# Patient Record
Sex: MALE | Race: OTHER | HISPANIC OR LATINO | URBAN - METROPOLITAN AREA
[De-identification: names, ages, dates, MRNs, and addresses within clinical notes are randomized per-mention and may not be internally consistent; named-entity substitution may affect disease eponyms.]

---

## 2017-11-16 ENCOUNTER — EMERGENCY (EMERGENCY)
Facility: HOSPITAL | Age: 62
LOS: 1 days | Discharge: HOME | End: 2017-11-16
Attending: EMERGENCY MEDICINE | Admitting: EMERGENCY MEDICINE
Payer: MEDICARE

## 2017-11-16 VITALS
OXYGEN SATURATION: 98 % | RESPIRATION RATE: 14 BRPM | HEART RATE: 60 BPM | SYSTOLIC BLOOD PRESSURE: 121 MMHG | TEMPERATURE: 98 F | DIASTOLIC BLOOD PRESSURE: 65 MMHG

## 2017-11-16 VITALS
HEART RATE: 78 BPM | DIASTOLIC BLOOD PRESSURE: 43 MMHG | SYSTOLIC BLOOD PRESSURE: 93 MMHG | OXYGEN SATURATION: 97 % | RESPIRATION RATE: 15 BRPM | TEMPERATURE: 98 F

## 2017-11-16 LAB
ALBUMIN SERPL ELPH-MCNC: 3.8 G/DL — SIGNIFICANT CHANGE UP (ref 3.3–5)
ALP SERPL-CCNC: 87 U/L — SIGNIFICANT CHANGE UP (ref 40–120)
ALT FLD-CCNC: 17 U/L — SIGNIFICANT CHANGE UP (ref 4–41)
AST SERPL-CCNC: 20 U/L — SIGNIFICANT CHANGE UP (ref 4–40)
BASE EXCESS BLDV CALC-SCNC: 5.7 MMOL/L — SIGNIFICANT CHANGE UP
BASOPHILS # BLD AUTO: 0.06 K/UL — SIGNIFICANT CHANGE UP (ref 0–0.2)
BASOPHILS NFR BLD AUTO: 1.4 % — SIGNIFICANT CHANGE UP (ref 0–2)
BILIRUB SERPL-MCNC: 0.2 MG/DL — SIGNIFICANT CHANGE UP (ref 0.2–1.2)
BLOOD GAS VENOUS - CREATININE: 0.45 MG/DL — LOW (ref 0.5–1.3)
BUN SERPL-MCNC: 10 MG/DL — SIGNIFICANT CHANGE UP (ref 7–23)
CALCIUM SERPL-MCNC: 8.6 MG/DL — SIGNIFICANT CHANGE UP (ref 8.4–10.5)
CHLORIDE BLDV-SCNC: 102 MMOL/L — SIGNIFICANT CHANGE UP (ref 96–108)
CHLORIDE SERPL-SCNC: 99 MMOL/L — SIGNIFICANT CHANGE UP (ref 98–107)
CO2 SERPL-SCNC: 28 MMOL/L — SIGNIFICANT CHANGE UP (ref 22–31)
CREAT SERPL-MCNC: 0.56 MG/DL — SIGNIFICANT CHANGE UP (ref 0.5–1.3)
EOSINOPHIL # BLD AUTO: 0.12 K/UL — SIGNIFICANT CHANGE UP (ref 0–0.5)
EOSINOPHIL NFR BLD AUTO: 2.8 % — SIGNIFICANT CHANGE UP (ref 0–6)
GAS PNL BLDV: 132 MMOL/L — LOW (ref 136–146)
GLUCOSE BLDV-MCNC: 181 — HIGH (ref 70–99)
GLUCOSE SERPL-MCNC: 183 MG/DL — HIGH (ref 70–99)
HCO3 BLDV-SCNC: 28 MMOL/L — HIGH (ref 20–27)
HCT VFR BLD CALC: 35.9 % — LOW (ref 39–50)
HCT VFR BLDV CALC: 38.1 % — LOW (ref 39–51)
HGB BLD-MCNC: 11.7 G/DL — LOW (ref 13–17)
HGB BLDV-MCNC: 12.4 G/DL — LOW (ref 13–17)
IMM GRANULOCYTES # BLD AUTO: 0.01 # — SIGNIFICANT CHANGE UP
IMM GRANULOCYTES NFR BLD AUTO: 0.2 % — SIGNIFICANT CHANGE UP (ref 0–1.5)
LACTATE BLDV-MCNC: 1.1 MMOL/L — SIGNIFICANT CHANGE UP (ref 0.5–2)
LYMPHOCYTES # BLD AUTO: 1.58 K/UL — SIGNIFICANT CHANGE UP (ref 1–3.3)
LYMPHOCYTES # BLD AUTO: 36.7 % — SIGNIFICANT CHANGE UP (ref 13–44)
MAGNESIUM SERPL-MCNC: 1.8 MG/DL — SIGNIFICANT CHANGE UP (ref 1.6–2.6)
MCHC RBC-ENTMCNC: 30.2 PG — SIGNIFICANT CHANGE UP (ref 27–34)
MCHC RBC-ENTMCNC: 32.6 % — SIGNIFICANT CHANGE UP (ref 32–36)
MCV RBC AUTO: 92.8 FL — SIGNIFICANT CHANGE UP (ref 80–100)
MONOCYTES # BLD AUTO: 0.48 K/UL — SIGNIFICANT CHANGE UP (ref 0–0.9)
MONOCYTES NFR BLD AUTO: 11.1 % — SIGNIFICANT CHANGE UP (ref 2–14)
NEUTROPHILS # BLD AUTO: 2.06 K/UL — SIGNIFICANT CHANGE UP (ref 1.8–7.4)
NEUTROPHILS NFR BLD AUTO: 47.8 % — SIGNIFICANT CHANGE UP (ref 43–77)
NRBC # FLD: 0 — SIGNIFICANT CHANGE UP
PCO2 BLDV: 57 MMHG — HIGH (ref 41–51)
PH BLDV: 7.36 PH — SIGNIFICANT CHANGE UP (ref 7.32–7.43)
PHOSPHATE SERPL-MCNC: 3.9 MG/DL — SIGNIFICANT CHANGE UP (ref 2.5–4.5)
PLATELET # BLD AUTO: 302 K/UL — SIGNIFICANT CHANGE UP (ref 150–400)
PMV BLD: 10.1 FL — SIGNIFICANT CHANGE UP (ref 7–13)
PO2 BLDV: 34 MMHG — LOW (ref 35–40)
POTASSIUM BLDV-SCNC: 3.8 MMOL/L — SIGNIFICANT CHANGE UP (ref 3.4–4.5)
POTASSIUM SERPL-MCNC: 4.1 MMOL/L — SIGNIFICANT CHANGE UP (ref 3.5–5.3)
POTASSIUM SERPL-SCNC: 4.1 MMOL/L — SIGNIFICANT CHANGE UP (ref 3.5–5.3)
PROT SERPL-MCNC: 6.3 G/DL — SIGNIFICANT CHANGE UP (ref 6–8.3)
RBC # BLD: 3.87 M/UL — LOW (ref 4.2–5.8)
RBC # FLD: 12.7 % — SIGNIFICANT CHANGE UP (ref 10.3–14.5)
SAO2 % BLDV: 61 % — SIGNIFICANT CHANGE UP (ref 60–85)
SODIUM SERPL-SCNC: 138 MMOL/L — SIGNIFICANT CHANGE UP (ref 135–145)
WBC # BLD: 4.31 K/UL — SIGNIFICANT CHANGE UP (ref 3.8–10.5)
WBC # FLD AUTO: 4.31 K/UL — SIGNIFICANT CHANGE UP (ref 3.8–10.5)

## 2017-11-16 PROCEDURE — 99283 EMERGENCY DEPT VISIT LOW MDM: CPT | Mod: GC

## 2017-11-16 RX ORDER — POLYETHYLENE GLYCOL 3350 17 G/17G
17 POWDER, FOR SOLUTION ORAL ONCE
Qty: 0 | Refills: 0 | Status: COMPLETED | OUTPATIENT
Start: 2017-11-16 | End: 2017-11-16

## 2017-11-16 RX ADMIN — POLYETHYLENE GLYCOL 3350 17 GRAM(S): 17 POWDER, FOR SOLUTION ORAL at 23:15

## 2017-11-16 NOTE — ED ADULT TRIAGE NOTE - CHIEF COMPLAINT QUOTE
Pt brought in by EMS for constipation since 5PM.  Pt states this happens intermittently.  Denies any abdominal pain/N/V/urinary symptoms.  Pt appears unkempt.  Pt states has no place to go if discharged.  Denies any PMHx.

## 2017-11-16 NOTE — ED ADULT NURSE NOTE - OBJECTIVE STATEMENT
Received on stretcher in 4, awake, alert, NAD observed, respirations even and unlabored on room air. 61 YO male no significant PMH c/o constipation. Endorses constipation 4-5 months, last BM earlier this evening, states it was normal. Denies other complaints. Comfort and safety measures provided. Call bell within reach.

## 2017-11-16 NOTE — ED PROVIDER NOTE - ATTENDING CONTRIBUTION TO CARE
eric: limited hx from pt. No records in EMR.  Homeless and appears with 2 suitcases. States he is constipated but had his lat BM today. Knows season,. year and president. Appears tired but offers no complaints.  has several hospital bands. One has date 11/11/17. pt states he stayed overnight at [facility]; reason??  Pt is hungary and asking for food.   exam: unkempt. otherwise unremarkable.  Will obtain labs and contact

## 2017-12-03 ENCOUNTER — EMERGENCY (EMERGENCY)
Facility: HOSPITAL | Age: 62
LOS: 0 days | Discharge: HOME | End: 2017-12-03

## 2017-12-03 DIAGNOSIS — L03.119 CELLULITIS OF UNSPECIFIED PART OF LIMB: ICD-10-CM

## 2017-12-03 DIAGNOSIS — Z02.9 ENCOUNTER FOR ADMINISTRATIVE EXAMINATIONS, UNSPECIFIED: ICD-10-CM

## 2017-12-05 ENCOUNTER — INPATIENT (INPATIENT)
Facility: HOSPITAL | Age: 62
LOS: 8 days | Discharge: NURSING, REHAB OR LONG-TERM CARE | End: 2017-12-14
Attending: INTERNAL MEDICINE

## 2017-12-05 DIAGNOSIS — L03.119 CELLULITIS OF UNSPECIFIED PART OF LIMB: ICD-10-CM

## 2017-12-18 DIAGNOSIS — Z91.14 PATIENT'S OTHER NONCOMPLIANCE WITH MEDICATION REGIMEN: ICD-10-CM

## 2017-12-18 DIAGNOSIS — L03.116 CELLULITIS OF LEFT LOWER LIMB: ICD-10-CM

## 2017-12-18 DIAGNOSIS — R05 COUGH: ICD-10-CM

## 2017-12-18 DIAGNOSIS — E87.6 HYPOKALEMIA: ICD-10-CM

## 2017-12-18 DIAGNOSIS — F17.210 NICOTINE DEPENDENCE, CIGARETTES, UNCOMPLICATED: ICD-10-CM

## 2017-12-18 DIAGNOSIS — E83.42 HYPOMAGNESEMIA: ICD-10-CM

## 2017-12-18 DIAGNOSIS — I10 ESSENTIAL (PRIMARY) HYPERTENSION: ICD-10-CM

## 2017-12-18 DIAGNOSIS — E87.1 HYPO-OSMOLALITY AND HYPONATREMIA: ICD-10-CM

## 2017-12-18 DIAGNOSIS — J45.909 UNSPECIFIED ASTHMA, UNCOMPLICATED: ICD-10-CM

## 2017-12-18 DIAGNOSIS — Z59.0 HOMELESSNESS: ICD-10-CM

## 2017-12-18 DIAGNOSIS — E11.9 TYPE 2 DIABETES MELLITUS WITHOUT COMPLICATIONS: ICD-10-CM

## 2017-12-18 SDOH — ECONOMIC STABILITY - HOUSING INSECURITY: HOMELESSNESS: Z59.0

## 2018-04-04 ENCOUNTER — EMERGENCY (EMERGENCY)
Facility: HOSPITAL | Age: 63
LOS: 0 days | Discharge: HOME | End: 2018-04-04
Attending: EMERGENCY MEDICINE

## 2018-04-04 VITALS
OXYGEN SATURATION: 99 % | RESPIRATION RATE: 18 BRPM | DIASTOLIC BLOOD PRESSURE: 93 MMHG | TEMPERATURE: 98 F | HEART RATE: 72 BPM | HEIGHT: 71 IN | SYSTOLIC BLOOD PRESSURE: 154 MMHG | WEIGHT: 173.06 LBS
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
## 2018-04-04 DIAGNOSIS — M79.672 PAIN IN LEFT FOOT: ICD-10-CM
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
## 2018-04-04 DIAGNOSIS — S93.602A UNSPECIFIED SPRAIN OF LEFT FOOT, INITIAL ENCOUNTER: ICD-10-CM
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
## 2018-04-04 RX ORDER — ACETAMINOPHEN 500 MG
975 TABLET ORAL ONCE
Qty: 0 | Refills: 0 | Status: COMPLETED | OUTPATIENT
Start: 2018-04-04 | End: 2018-04-04

## 2018-04-04 RX ADMIN — Medication 975 MILLIGRAM(S): at 04:47

## 2018-04-04 NOTE — ED PROVIDER NOTE - ATTENDING CONTRIBUTION TO CARE
I personally evaluated the patient. I reviewed the Resident’s or Physician Assistant’s note (as assigned above), and agree with the findings and plan

## 2018-04-04 NOTE — ED PROVIDER NOTE - OBJECTIVE STATEMENT
62 year old male here for left foot pain x 1 year. patient states that he walks a lot and when he does it hurts. patient states better with rest. denies injury, redness, fever, chills.

## 2018-04-06 ENCOUNTER — EMERGENCY (EMERGENCY)
Facility: HOSPITAL | Age: 63
LOS: 0 days | Discharge: HOME | End: 2018-04-06
Attending: EMERGENCY MEDICINE

## 2018-04-06 VITALS
SYSTOLIC BLOOD PRESSURE: 130 MMHG | OXYGEN SATURATION: 99 % | RESPIRATION RATE: 18 BRPM | HEART RATE: 90 BPM | DIASTOLIC BLOOD PRESSURE: 81 MMHG

## 2018-04-06 VITALS
HEIGHT: 71 IN | OXYGEN SATURATION: 99 % | DIASTOLIC BLOOD PRESSURE: 89 MMHG | WEIGHT: 173.06 LBS | TEMPERATURE: 98 F | SYSTOLIC BLOOD PRESSURE: 132 MMHG | RESPIRATION RATE: 18 BRPM | HEART RATE: 98 BPM

## 2018-04-06 DIAGNOSIS — K59.00 CONSTIPATION, UNSPECIFIED: ICD-10-CM

## 2018-04-06 DIAGNOSIS — E11.9 TYPE 2 DIABETES MELLITUS WITHOUT COMPLICATIONS: ICD-10-CM

## 2018-04-06 RX ORDER — MULTIVIT WITH MIN/MFOLATE/K2 340-15/3 G
300 POWDER (GRAM) ORAL ONCE
Qty: 0 | Refills: 0 | Status: COMPLETED | OUTPATIENT
Start: 2018-04-06 | End: 2018-04-06

## 2018-04-06 RX ADMIN — Medication 300 MILLILITER(S): at 13:01

## 2018-04-06 NOTE — ED PROVIDER NOTE - MEDICAL DECISION MAKING DETAILS
I have personally performed a history and physical exam on this patient and personally directed the management of the patient. Patient presents for evaluation of constipation, he has not had a bowel movement 2-3 days he denies any fevers or chills. He denies any vomiting.  He denies any fevers or chills  On physical exam the patient is nc/at perrla eomi oropharynx clear cta b/l, rrr s1s2 noted abd-soft nt nd bs +e xt from with no focal deficits.  He was given a magnesium citrate and has improved I will discharge at this time

## 2018-04-06 NOTE — ED PROVIDER NOTE - OBJECTIVE STATEMENT
62y M with constipation.  Hx paranoid schitzophrenia, DM, with constipation for 2 days.  No nausea or vomiting, no fevers, no diarrhea, no abd pain, no chest pain, no SOB, no cough.  Not taking any of his meds for a long time.  No allergies.

## 2018-04-06 NOTE — ED PROVIDER NOTE - PHYSICAL EXAMINATION
CONSTITUTIONAL: Well-developed; well-nourished; in no acute distress.   SKIN: warm, dry  HEAD: Normocephalic; atraumatic.  EYES: PERRL, EOM, no conj injection, sclera clear  ENT: No nasal discharge; airway clear.  NECK: Supple; non tender.  No midline ttp ctls  CARD: S1, S2 normal; no murmurs, no gallops, no rubs. Regular rate and rhythm. 2+ RPs and DPs bilat, no carotid bruits, no pedal edema, no calf pain b/l  RESP: CTAB good air movement No wheezes, no rales, no rhonchi.  ABD: soft, nd, no rebound no guarding, bowel sounds x 4 ext, no CVA ttp, nt  EXT: Normal ROM.  No clubbing, no cyanosis   LYMPH: No acute cervical adenopathy.  NEURO: Alert, oriented, motor 5/5 bilat, sensation intact bilat, CN 2-12 intact, no nystagmus, no dysmetria on finger to nose, neg pronator, neg romberg, no quadrantanopsia, nml gait.   PSYCH: Cooperative, appropriate. No SI, no HI, no VH, no AH.

## 2018-04-06 NOTE — ED PROVIDER NOTE - NS ED ROS FT
Eyes:  No eye pain No visual changes, or discharge.  ENMT:  No ear pain No hearing changes, discharge or infections. No neck pain or stiffness. No throat pain  Cardiac:  No chest pain, SOB or edema.   Respiratory:  No cough or respiratory distress. No hemoptysis.   GI:  No nausea, vomiting, diarrhea, +constipation no abdominal pain.  :  No dysuria, frequency or burning.  MS:  No myalgia, joint pain or back pain.  Neuro:  No headache, paresthesias or weakness.  No LOC.  Skin:  No skin rash.

## 2018-04-07 ENCOUNTER — EMERGENCY (EMERGENCY)
Facility: HOSPITAL | Age: 63
LOS: 0 days | Discharge: HOME | End: 2018-04-07
Attending: EMERGENCY MEDICINE | Admitting: EMERGENCY MEDICINE

## 2018-04-07 VITALS
DIASTOLIC BLOOD PRESSURE: 74 MMHG | RESPIRATION RATE: 18 BRPM | TEMPERATURE: 97 F | SYSTOLIC BLOOD PRESSURE: 132 MMHG | OXYGEN SATURATION: 97 % | HEART RATE: 90 BPM

## 2018-04-07 VITALS
DIASTOLIC BLOOD PRESSURE: 67 MMHG | SYSTOLIC BLOOD PRESSURE: 152 MMHG | TEMPERATURE: 97 F | HEART RATE: 97 BPM | HEIGHT: 71 IN | OXYGEN SATURATION: 98 % | RESPIRATION RATE: 18 BRPM | WEIGHT: 173.06 LBS

## 2018-04-07 DIAGNOSIS — R26.2 DIFFICULTY IN WALKING, NOT ELSEWHERE CLASSIFIED: ICD-10-CM

## 2018-04-07 DIAGNOSIS — E11.9 TYPE 2 DIABETES MELLITUS WITHOUT COMPLICATIONS: ICD-10-CM

## 2018-04-07 NOTE — ED PROVIDER NOTE - OBJECTIVE STATEMENT
62 M pmh paranoid schizophrenia bibems for inability to ambulate per patient.  patient reports he is walking with shorter steps, concerned he may get a cramp. no pain. no midline back pain. no loss of sensation.

## 2018-04-07 NOTE — ED PROVIDER NOTE - MEDICAL DECISION MAKING DETAILS
62 male here for limited ambulation. Has known psych history and is undomiciled. Medical screening exam completed will continue as outpatient.

## 2018-04-07 NOTE — ED PROVIDER NOTE - PHYSICAL EXAMINATION
CONSTITUTIONAL: Well-developed; well-nourished; in no acute distress.   SKIN: warm, dry  ENT: no nasal discharge; airway clear.  CARD: S1, S2 normal  RESP: No wheezes, rales or rhonchi.  EXT: Normal ROM.  5/5 strength bilaterally.  normal gait.   NEURO: Alert, oriented, grossly unremarkable

## 2018-04-07 NOTE — ED PROVIDER NOTE - NS ED ROS FT
MS:  inability to ambulate per patient.  walking slower.   Neuro:  No headache or weakness.   Skin:  No skin rash.

## 2018-04-13 ENCOUNTER — EMERGENCY (EMERGENCY)
Facility: HOSPITAL | Age: 63
LOS: 0 days | Discharge: HOME | End: 2018-04-13
Attending: EMERGENCY MEDICINE

## 2018-04-13 VITALS
WEIGHT: 149.91 LBS | TEMPERATURE: 98 F | SYSTOLIC BLOOD PRESSURE: 98 MMHG | DIASTOLIC BLOOD PRESSURE: 61 MMHG | HEIGHT: 70 IN | HEART RATE: 98 BPM | OXYGEN SATURATION: 98 % | RESPIRATION RATE: 20 BRPM

## 2018-04-13 DIAGNOSIS — Z59.0 HOMELESSNESS: ICD-10-CM

## 2018-04-13 DIAGNOSIS — F99 MENTAL DISORDER, NOT OTHERWISE SPECIFIED: ICD-10-CM

## 2018-04-13 DIAGNOSIS — E11.9 TYPE 2 DIABETES MELLITUS WITHOUT COMPLICATIONS: ICD-10-CM

## 2018-04-13 DIAGNOSIS — F17.210 NICOTINE DEPENDENCE, CIGARETTES, UNCOMPLICATED: ICD-10-CM

## 2018-04-13 SDOH — ECONOMIC STABILITY - HOUSING INSECURITY: HOMELESSNESS: Z59.0

## 2018-04-13 NOTE — ED PROVIDER NOTE - PHYSICAL EXAMINATION
GENERAL: NAD, well-developed  CHEST/LUNG: Clear to auscultation bilaterally; No wheeze, rhonchi, or rales  HEART: Regular rate and rhythm; No murmurs, rubs, or gallops  PSYCH: AAOx3, cooperative, appropriate GENERAL: NAD, well-developed  CHEST/LUNG: Clear to auscultation bilaterally; No wheeze, rhonchi, or rales  HEART: Regular rate and rhythm; No murmurs, rubs, or gallops  PSYCH: AAOx3, cooperative, appropriate  HEAD/Neck, NC/AT  Ambulating well in ED

## 2018-04-13 NOTE — ED PROVIDER NOTE - PROGRESS NOTE DETAILS
Patient states he is feeling fine and has no complaints at this time. Patient is requesting food and to be sent home with metro card. PA Fellow note reviewed, Patient alert, Sober, ambulating well in ED. Stable for discharge

## 2018-04-13 NOTE — ED PROVIDER NOTE - ATTENDING CONTRIBUTION TO CARE
Pt is a 63yo male with schizoaffective disorder and homelessness who was brought in by EMS after he was found sleeping in front of someone's house.  Denies any acute medical complaints.    Exam: unkempt, eating a meal, clear speech, normal gait  Imp: psych disorder  Plan: dc

## 2018-04-13 NOTE — ED PROVIDER NOTE - OBJECTIVE STATEMENT
[age] yo male BIB [facility]  after being found sleeping in front of someone's house.  Patient has no complaints. Patient denies fainting, dizziness, recent fall, SOB, chest pain, foot pain, or N/V.

## 2018-04-13 NOTE — ED PROVIDER NOTE - NS ED ROS FT
CONSTITUTIONAL: No weakness, fevers or chills  RESPIRATORY:  No shortness of breath  CARDIOVASCULAR: No chest pain or palpitations  GASTROINTESTINAL: No abdominal pain. No nausea or vomiting  NEUROLOGICAL: No dizziness. No headache. No fall. No fainting or blackouts.  SKIN: No itching, rashes

## 2018-04-15 ENCOUNTER — EMERGENCY (EMERGENCY)
Facility: HOSPITAL | Age: 63
LOS: 0 days | Discharge: HOME | End: 2018-04-15
Attending: EMERGENCY MEDICINE

## 2018-04-15 VITALS
RESPIRATION RATE: 20 BRPM | DIASTOLIC BLOOD PRESSURE: 84 MMHG | OXYGEN SATURATION: 99 % | HEART RATE: 84 BPM | TEMPERATURE: 97 F | SYSTOLIC BLOOD PRESSURE: 146 MMHG | WEIGHT: 173.06 LBS | HEIGHT: 71 IN

## 2018-04-15 DIAGNOSIS — R53.83 OTHER FATIGUE: ICD-10-CM

## 2018-04-15 DIAGNOSIS — E11.9 TYPE 2 DIABETES MELLITUS WITHOUT COMPLICATIONS: ICD-10-CM

## 2018-04-15 NOTE — ED PROVIDER NOTE - NS ED ROS FT
Review of Systems    Constitutional: (-) fever  Cardiovascular: (-) chest pain, (-) syncope  Respiratory: (-) cough, (-) shortness of breath  Gastrointestinal: (-) vomiting, (-) diarrhea  Musculoskeletal: (-) neck pain, (-) back pain, (-) joint pain  Integumentary: (-) rash, (-) edema  Neurological: (-) headache, (-) altered mental status  Psychiatric: (-) hallucinations

## 2018-04-15 NOTE — ED PROVIDER NOTE - PHYSICAL EXAMINATION
Gen: Alert, NAD, well appearing  Head: NC, AT, PERRL, EOMI, normal lids/conjunctiva  Neck: +supple, no tenderness/meningismus,  Pulm: Bilateral BS, normal resp effort, no wheeze/stridor/retractions  CV: RRR, no murmer  Abd: soft, NT/ND, +BS, no organomegaly  Mskel: no edema/erythema/cyanosis  Skin: no rash, warm/dry  Neuro: AAOx3, no sensory/motor deficits

## 2018-04-15 NOTE — ED PROVIDER NOTE - OBJECTIVE STATEMENT
61 yo male here stating that he is tired of walking. No CP, SOB, abdominal pains. No f/c/n/v. No homicidal or suicidal ideations.

## 2018-04-15 NOTE — ED PROVIDER NOTE - ATTENDING CONTRIBUTION TO CARE
I personally evaluated the patient. I reviewed the Resident’s or Physician Assistant’s note (as assigned above), and agree with the findings and plan except as documented in my note.  Pt undomiciled, no acute medical issue, given breakfast

## 2018-04-17 ENCOUNTER — EMERGENCY (EMERGENCY)
Facility: HOSPITAL | Age: 63
LOS: 0 days | Discharge: NURSING, REHAB OR LONG-TERM CARE | End: 2018-04-17
Attending: EMERGENCY MEDICINE | Admitting: EMERGENCY MEDICINE

## 2018-04-17 VITALS
SYSTOLIC BLOOD PRESSURE: 104 MMHG | TEMPERATURE: 97 F | RESPIRATION RATE: 18 BRPM | OXYGEN SATURATION: 98 % | DIASTOLIC BLOOD PRESSURE: 55 MMHG | HEART RATE: 74 BPM

## 2018-04-17 VITALS
SYSTOLIC BLOOD PRESSURE: 123 MMHG | RESPIRATION RATE: 19 BRPM | HEART RATE: 88 BPM | OXYGEN SATURATION: 98 % | TEMPERATURE: 97 F | HEIGHT: 69 IN | DIASTOLIC BLOOD PRESSURE: 59 MMHG | WEIGHT: 160.06 LBS

## 2018-04-17 DIAGNOSIS — I83.92 ASYMPTOMATIC VARICOSE VEINS OF LEFT LOWER EXTREMITY: ICD-10-CM

## 2018-04-17 DIAGNOSIS — R53.83 OTHER FATIGUE: ICD-10-CM

## 2018-04-17 DIAGNOSIS — E11.9 TYPE 2 DIABETES MELLITUS WITHOUT COMPLICATIONS: ICD-10-CM

## 2018-04-17 DIAGNOSIS — K59.09 OTHER CONSTIPATION: ICD-10-CM

## 2018-04-17 DIAGNOSIS — F17.200 NICOTINE DEPENDENCE, UNSPECIFIED, UNCOMPLICATED: ICD-10-CM

## 2018-04-17 DIAGNOSIS — K40.90 UNILATERAL INGUINAL HERNIA, WITHOUT OBSTRUCTION OR GANGRENE, NOT SPECIFIED AS RECURRENT: ICD-10-CM

## 2018-04-17 DIAGNOSIS — L29.9 PRURITUS, UNSPECIFIED: ICD-10-CM

## 2018-04-17 LAB
ALBUMIN SERPL ELPH-MCNC: 4 G/DL — SIGNIFICANT CHANGE UP (ref 3.5–5.2)
ALP SERPL-CCNC: 93 U/L — SIGNIFICANT CHANGE UP (ref 30–115)
ALT FLD-CCNC: 28 U/L — SIGNIFICANT CHANGE UP (ref 0–41)
ANION GAP SERPL CALC-SCNC: 15 MMOL/L — HIGH (ref 7–14)
AST SERPL-CCNC: 34 U/L — SIGNIFICANT CHANGE UP (ref 0–41)
BILIRUB SERPL-MCNC: 0.4 MG/DL — SIGNIFICANT CHANGE UP (ref 0.2–1.2)
BUN SERPL-MCNC: 11 MG/DL — SIGNIFICANT CHANGE UP (ref 10–20)
CALCIUM SERPL-MCNC: 8.7 MG/DL — SIGNIFICANT CHANGE UP (ref 8.5–10.1)
CHLORIDE SERPL-SCNC: 95 MMOL/L — LOW (ref 98–110)
CO2 SERPL-SCNC: 26 MMOL/L — SIGNIFICANT CHANGE UP (ref 17–32)
CREAT SERPL-MCNC: 0.5 MG/DL — LOW (ref 0.7–1.5)
GLUCOSE SERPL-MCNC: 230 MG/DL — HIGH (ref 70–99)
HCT VFR BLD CALC: 36.8 % — LOW (ref 42–52)
HGB BLD-MCNC: 12.5 G/DL — LOW (ref 14–18)
MCHC RBC-ENTMCNC: 31.3 PG — HIGH (ref 27–31)
MCHC RBC-ENTMCNC: 34 G/DL — SIGNIFICANT CHANGE UP (ref 32–37)
MCV RBC AUTO: 92.2 FL — SIGNIFICANT CHANGE UP (ref 80–94)
NRBC # BLD: 0 /100 WBCS — SIGNIFICANT CHANGE UP (ref 0–0)
PLATELET # BLD AUTO: 308 K/UL — SIGNIFICANT CHANGE UP (ref 130–400)
POTASSIUM SERPL-MCNC: 3.7 MMOL/L — SIGNIFICANT CHANGE UP (ref 3.5–5)
POTASSIUM SERPL-SCNC: 3.7 MMOL/L — SIGNIFICANT CHANGE UP (ref 3.5–5)
PROT SERPL-MCNC: 6.1 G/DL — SIGNIFICANT CHANGE UP (ref 6–8)
RBC # BLD: 3.99 M/UL — LOW (ref 4.7–6.1)
RBC # FLD: 12.6 % — SIGNIFICANT CHANGE UP (ref 11.5–14.5)
SODIUM SERPL-SCNC: 136 MMOL/L — SIGNIFICANT CHANGE UP (ref 135–146)
WBC # BLD: 5 K/UL — SIGNIFICANT CHANGE UP (ref 4.8–10.8)
WBC # FLD AUTO: 5 K/UL — SIGNIFICANT CHANGE UP (ref 4.8–10.8)

## 2018-04-17 NOTE — ED PROVIDER NOTE - OBJECTIVE STATEMENT
62 homeless male presenting via EMS for fatigue. That is his 3rd visit in 2 weeks to the ED. He reports chronic constipation with intermittent episode of loose stools. Denies melena, hematochezia, abdominal pain, N/V, fever or chills. No chest pain, dyspnea, palpitations, weight changes. He was in a group home for while and then left it for some reasons. 62 homeless male with DM II and schizophrenia presenting via EMS for fatigue. That is his 3rd visit in 2 weeks to the ED. He reports chronic constipation with intermittent episode of loose stools. Denies melena, hematochezia, abdominal pain, N/V, fever or chills. No chest pain, dyspnea, palpitations, weight changes. He was in a group home for while and then left it for some reasons. He is not taking any medications for his DM or schizophrenia. 62 homeless male with DM II and schizophrenia presenting via EMS for fatigue. That is his 3rd visit in 2 weeks to the ED. He reports chronic constipation with intermittent episode of loose stools. Denies melena, hematochezia, abdominal pain, N/V, fever or chills. No chest pain, dyspnea, palpitations, weight changes. He was in a group home for while and then left it for unknown reasons. He is not taking any medications for his DM or schizophrenia.

## 2018-04-17 NOTE — ED ADULT TRIAGE NOTE - CHIEF COMPLAINT QUOTE
brought by ems   awake alert steady gait  just hungry and looking for shelter /  24 hour drop in shelters given to pt

## 2018-04-17 NOTE — ED PROVIDER NOTE - ATTENDING CONTRIBUTION TO CARE
I personally evaluated the patient. I reviewed the Resident’s or Physician Assistant’s note (as assigned above), and agree with the findings and plan except as documented in my note.  Patient seen in ED and presumed homeless, PE as above, labs reviewed, after consultation with social work discovered eloped from [facility] 2 weeks ago, d/w Tang from the home in the ED, will d/c pt back

## 2018-04-17 NOTE — ED PROVIDER NOTE - NS ED ROS FT
REVIEW OF SYSTEMS:    CONSTITUTIONAL: no fever or chills.  HEENT: No visual changes, no hearing loss, no throat pain, no headache.  NECK: No pain or stiffness, no lymphadenopathy.  RESPIRATORY: No cough, wheezing, hemoptysis. No shortness of breath.  CARDIOVASCULAR: No chest pain, no palpitations, no orthopnea, no PND.  GASTROINTESTINAL: see HPI.  GENITOURINARY: No dysuria, frequency or hematuria.  NEUROLOGICAL: No paresthesia or weakness, no dizziness.  MUSCULOSKELETAL: No back pain. No joint pain or swelling, no morning stiffness. Normal ROM.  SKIN: + itching, no rashes.

## 2018-09-22 ENCOUNTER — EMERGENCY (EMERGENCY)
Facility: HOSPITAL | Age: 63
LOS: 0 days | Discharge: HOME | End: 2018-09-22
Attending: EMERGENCY MEDICINE | Admitting: EMERGENCY MEDICINE

## 2018-09-22 VITALS
OXYGEN SATURATION: 98 % | DIASTOLIC BLOOD PRESSURE: 79 MMHG | TEMPERATURE: 98 F | WEIGHT: 143.08 LBS | HEIGHT: 70 IN | RESPIRATION RATE: 20 BRPM | SYSTOLIC BLOOD PRESSURE: 136 MMHG | HEART RATE: 79 BPM

## 2018-09-22 DIAGNOSIS — M25.50 PAIN IN UNSPECIFIED JOINT: ICD-10-CM

## 2018-09-22 DIAGNOSIS — G89.29 OTHER CHRONIC PAIN: ICD-10-CM

## 2018-09-22 DIAGNOSIS — E11.9 TYPE 2 DIABETES MELLITUS WITHOUT COMPLICATIONS: ICD-10-CM

## 2018-09-22 DIAGNOSIS — I10 ESSENTIAL (PRIMARY) HYPERTENSION: ICD-10-CM

## 2018-09-22 DIAGNOSIS — F25.9 SCHIZOAFFECTIVE DISORDER, UNSPECIFIED: ICD-10-CM

## 2018-09-22 DIAGNOSIS — Z91.14 PATIENT'S OTHER NONCOMPLIANCE WITH MEDICATION REGIMEN: ICD-10-CM

## 2018-09-22 DIAGNOSIS — E78.5 HYPERLIPIDEMIA, UNSPECIFIED: ICD-10-CM

## 2018-09-22 PROBLEM — F25.0 SCHIZOAFFECTIVE DISORDER, BIPOLAR TYPE: Chronic | Status: ACTIVE | Noted: 2018-04-06

## 2018-09-22 RX ORDER — ACETAMINOPHEN 500 MG
650 TABLET ORAL ONCE
Qty: 0 | Refills: 0 | Status: COMPLETED | OUTPATIENT
Start: 2018-09-22 | End: 2018-09-22

## 2018-09-22 RX ADMIN — Medication 650 MILLIGRAM(S): at 05:58

## 2018-09-22 NOTE — ED PROVIDER NOTE - PHYSICAL EXAMINATION
Physical Exam  General: Awake, alert, NAD, WDWN, non-toxic appearing, NCAT  Eyes: PERRL, EOMI, no icterus, lids and conjunctivae are normal  ENT: External inspection normal, pink/moist membranes, no pharyngeal erythema/exudate  CV: S1S2, regular rate and rhythm, no murmur/gallops/rubs, no JVD, 2+ pulses b/l, no edema/cords/homans, warm/well-perfused  Respiratory: Normal respiratory rate/effort, no respiratory distress, normal voice, speaking full sentences, lungs clear to auscultation b/l, no wheezing/rales/rhonchi, no retractions, no stridor  Abdomen: Soft abdomen, no tender/distended/guarding/rebound, no CVA tender  Musculoskeletal: FROM all 4 extremities, N/V intact, pelvis stable, no TLS spinal tender/deform/step-offs, no johnathon tender/deform, stable gait  Neck: FROM neck, supple, no meningismus, trachea midline, no JVD, no cspine tender/step-offs  Integumentary: Color normal for race, warm and dry, no rash  Neuro: Oriented x3, CN 2-12 grossly intact, normal motor, normal sensory, normal gait

## 2018-09-22 NOTE — ED PROVIDER NOTE - MEDICAL DECISION MAKING DETAILS
63m w months-years of multiple joint pain. no trauma. analgesia given. Pt advised regarding symptomatic/supportive care, importance of PMD f/u, and symptoms to prompt ED return.

## 2018-09-22 NOTE — ED PROVIDER NOTE - PLAN OF CARE
63m w months-years of multiple joint pain. nontoxic appearing, n/v intact, no trauma, nonfocal neuro. --Analgesia as needed, symptomatic and supportive care, f/u PMD. 63m w months-years of multiple joint pain. nontoxic appearing, n/v intact, no trauma, nonfocal neuro. --Analgesia as needed, symptomatic and supportive care, f/u PMD/clinic.

## 2018-09-22 NOTE — ED PROVIDER NOTE - OBJECTIVE STATEMENT
63m w DM, HTN, HLD but not compliant w medications, presents for eval. Pt reports that he has pain all over his body for months-years. Symptoms are constant, moderate, no exacerbating/alleviating. No fall, no trauma. Pt denies SI/HI, denies self-harm attempt or OD. Pt reports that he came from the [facility].

## 2018-09-22 NOTE — ED PROVIDER NOTE - NS ED ROS FT
Review of Systems  Constitutional:  No fever or chills.   Eyes:  Negative.   ENMT:  No nasal congestion, discharge, or throat pain.   Cardiac:  No chest pain, syncope, or edema.  Respiratory:  No dyspnea, wheezing, or cough. No hemoptysis.  GI:  No vomiting, diarrhea, or abdominal pain. No melena or hematochezia.  :  No dysuria or hematuria.   Musculoskeletal:  No joint swelling or back pain. Chronic joint pain  Skin:  No skin rash, jaundice, or lesions.  Neuro:  No headache, loss of sensation, or focal weakness.  No change in mental status.

## 2018-09-22 NOTE — ED ADULT NURSE NOTE - NSIMPLEMENTINTERV_GEN_ALL_ED
Implemented All Universal Safety Interventions:  [place] to call system. Call bell, personal items and telephone within reach. Instruct patient to call for assistance. Room bathroom lighting operational. Non-slip footwear when patient is off stretcher. Physically safe environment: no spills, clutter or unnecessary equipment. Stretcher in lowest position, wheels locked, appropriate side rails in place.

## 2018-09-22 NOTE — ED PROVIDER NOTE - CARE PLAN
Assessment and plan of treatment:	63m w months-years of multiple joint pain. nontoxic appearing, n/v intact, no trauma, nonfocal neuro. --Analgesia as needed, symptomatic and supportive care, f/u PMD. Principal Discharge DX:	Chronic joint pain  Assessment and plan of treatment:	63m w months-years of multiple joint pain. nontoxic appearing, n/v intact, no trauma, nonfocal neuro. --Analgesia as needed, symptomatic and supportive care, f/u PMD/clinic.

## 2018-11-16 NOTE — ED ADULT TRIAGE NOTE - WEIGHT IN LBS
CONST: Well appearing in NAD  EYES:  Sclera and conjunctiva clear.  ENT: No nasal discharge.  Oropharynx normal appearing, no erythema or exudates. Uvula midline.  NECK: Non-tender, no meningeal signs, supple  CARD: Normal S1 S2; Normal rate and rhythm  RESP: Equal BS B/L, No wheezes, rhonchi or rales. No distress  GI: Soft, non-tender, non-distended.  MS: Normal ROM in all extremities. no calf pain, radial pulses 2+ bilaterally  SKIN: Warm, dry, no acute rashes. Good turgor  NEURO: awake alert and oriented to person and place, answering questions inappropriately, agitated, No focal deficits. Strength 5/5 with no sensory deficits 173

## 2019-01-28 ENCOUNTER — EMERGENCY (EMERGENCY)
Facility: HOSPITAL | Age: 64
LOS: 0 days | Discharge: HOME | End: 2019-01-28
Attending: EMERGENCY MEDICINE | Admitting: EMERGENCY MEDICINE

## 2019-01-28 VITALS
OXYGEN SATURATION: 97 % | RESPIRATION RATE: 20 BRPM | DIASTOLIC BLOOD PRESSURE: 81 MMHG | SYSTOLIC BLOOD PRESSURE: 107 MMHG | HEART RATE: 99 BPM | TEMPERATURE: 98 F

## 2019-01-28 DIAGNOSIS — Z59.0 HOMELESSNESS: ICD-10-CM

## 2019-01-28 DIAGNOSIS — E11.9 TYPE 2 DIABETES MELLITUS WITHOUT COMPLICATIONS: ICD-10-CM

## 2019-01-28 SDOH — ECONOMIC STABILITY - HOUSING INSECURITY: HOMELESSNESS: Z59.0

## 2019-01-28 NOTE — ED PROVIDER NOTE - PHYSICAL EXAMINATION
AOx4, disheveled, Non toxic appearing, NAD, speaking in full sentences.   Skin - warm and dry, no acute rash.   Head - normocephalic, atraumatic.   Eyes - PERRLA/EOMI, conjunctiva and sclera clear.   ENT- MM moist, no nasal discharge.  Pharynx unremarkable.  No mastoid or temporal ttp.   Neck - supple nt, no meningeal signs.   Heart - RRR s1s2 nl, no rub/murmur.   Lungs- No retractions, BS equal, CTAB.   Abdomen - soft ntnd no r/g.   Extremities- No LE edema, calves nttp b/l.

## 2019-01-28 NOTE — ED ADULT NURSE NOTE - CHIEF COMPLAINT QUOTE
"I come from the [place]. I'm just stopping by for a bite to eat. Then I'll be on my way." Pt is homeless.

## 2019-01-28 NOTE — ED PROVIDER NOTE - OBJECTIVE STATEMENT
62 yo m with no reported PMHx, homeless, presents for a meal. patient is from the [place]. Doesn't know [place] well. Just wanted something to eat. Has no complaints. Denies CP, SOB, abd pain

## 2019-01-28 NOTE — ED ADULT NURSE NOTE - NSIMPLEMENTINTERV_GEN_ALL_ED
Implemented All Universal Safety Interventions:  [place] to call system. Call bell, personal items and telephone within reach. Instruct patient to call for assistance. Room bathroom lighting operational. Non-slip footwear when patient is off stretcher. Physically safe environment: no spills, clutter or unnecessary equipment. Stretcher in lowest position, wheels locked, appropriate side rails in place.

## 2019-01-28 NOTE — ED ADULT TRIAGE NOTE - CHIEF COMPLAINT QUOTE
"I come from the [place]. I'm just stopping by for a bite to eat. Then I'll be on my way." Pt is homeless.

## 2019-08-17 NOTE — ED PROVIDER NOTE - TOBACCO USE
· Patient reports was drinking again up until beginning of July when she sustained a GI bleed  · Continue Lasix 20mg daily which was previously 10mg however patient confirms taking 20mg daily  · No longer taking spironolactone  · Continue 2g sodium diet  · Continue multivitamin, thiamine, and folic acid Current every day smoker

## 2019-09-23 ENCOUNTER — EMERGENCY (EMERGENCY)
Facility: HOSPITAL | Age: 64
LOS: 1 days | Discharge: HOME | End: 2019-09-23
Admitting: EMERGENCY MEDICINE
Payer: COMMERCIAL

## 2019-09-23 VITALS
SYSTOLIC BLOOD PRESSURE: 135 MMHG | RESPIRATION RATE: 15 BRPM | TEMPERATURE: 99 F | HEART RATE: 81 BPM | OXYGEN SATURATION: 99 % | DIASTOLIC BLOOD PRESSURE: 60 MMHG

## 2019-09-23 PROCEDURE — 99283 EMERGENCY DEPT VISIT LOW MDM: CPT

## 2019-09-23 NOTE — ED ADULT TRIAGE NOTE - CHIEF COMPLAINT QUOTE
Pt brought in by EMS from the street.  Pt homeless and called EMS because he was walking around and his feet hurt.  Presently denies any physical complaints, states pain has since resolved.  Denies any SI/HI/AH/VH.  Denies any drugs/ETOH.  Calm and cooperative in triage.  PMHx:  schizoaffective, DM

## 2019-09-23 NOTE — ED PROVIDER NOTE - PATIENT PORTAL LINK FT
You can access the FollowMyHealth Patient Portal offered by [facility] by registering at the following website: [email or web address]. By joining [employer]’s FollowMyHealth portal, you will also be able to view your health information using other applications (apps) compatible with our system.

## 2019-09-23 NOTE — ED PROVIDER NOTE - OBJECTIVE STATEMENT
63 y/o male pmh dm, schizoaffectiove, homeless, with no active complaints. Pt states that he was "hanging out around the business center and decided he wanted to get checked out." Pt has no active complaints. Pt is asking for milk and a sandwich. Denies chest pain, sob, abd pain, n/v/d, fever or chills.

## 2019-11-13 ENCOUNTER — EMERGENCY (EMERGENCY)
Facility: HOSPITAL | Age: 64
LOS: 0 days | Discharge: HOME | End: 2019-11-13
Attending: EMERGENCY MEDICINE
Payer: COMMERCIAL

## 2019-11-13 VITALS
TEMPERATURE: 98 F | WEIGHT: 162.92 LBS | SYSTOLIC BLOOD PRESSURE: 163 MMHG | HEIGHT: 63 IN | OXYGEN SATURATION: 99 % | HEART RATE: 80 BPM | RESPIRATION RATE: 18 BRPM | DIASTOLIC BLOOD PRESSURE: 83 MMHG

## 2019-11-13 DIAGNOSIS — R68.89 OTHER GENERAL SYMPTOMS AND SIGNS: ICD-10-CM

## 2019-11-13 DIAGNOSIS — F20.9 SCHIZOPHRENIA, UNSPECIFIED: ICD-10-CM

## 2019-11-13 DIAGNOSIS — Z59.0 HOMELESSNESS: ICD-10-CM

## 2019-11-13 PROCEDURE — 99282 EMERGENCY DEPT VISIT SF MDM: CPT

## 2019-11-13 SDOH — ECONOMIC STABILITY - HOUSING INSECURITY: HOMELESSNESS: Z59.0

## 2019-11-13 NOTE — ED ADULT NURSE NOTE - NSIMPLEMENTINTERV_GEN_ALL_ED
Implemented All Universal Safety Interventions:  [place] to call system. Call bell, personal items and telephone within reach. Instruct patient to call for assistance. Room bathroom lighting operational. Non-slip footwear when patient is off stretcher. Physically safe environment: no spills, clutter or unnecessary equipment. Stretcher in lowest position, wheels locked, appropriate side rails in place.

## 2019-11-13 NOTE — ED PROVIDER NOTE - PATIENT PORTAL LINK FT
You can access the FollowMyHealth Patient Portal offered by [facility] by registering at the following website: [email or web address]. By joining [employer]’s FollowMyHealth portal, you will also be able to view your health information using other applications (apps) compatible with our system.

## 2019-11-13 NOTE — ED ADULT TRIAGE NOTE - CHIEF COMPLAINT QUOTE
pt states "I'm cold". pt walked up to EMS and asked for ride to the hospital because he was cold. denies medical history

## 2019-11-13 NOTE — ED PROVIDER NOTE - PHYSICAL EXAMINATION
Gen: Alert, unkempt, disheveled   Head: NC, AT   Eyes: eomi  ENT: moist membranes   Neck: supple  Pulm: ctab  CV: RRR, ext wwp  Abd: soft, NT  Mskel: extremities x4 with normal ROM and no joint effusions. no ctl spine ttp.   Skin: no wounds  Neuro: AAOx3

## 2019-11-14 ENCOUNTER — EMERGENCY (EMERGENCY)
Facility: HOSPITAL | Age: 64
LOS: 1 days | Discharge: HOME | End: 2019-11-14
Attending: EMERGENCY MEDICINE
Payer: COMMERCIAL

## 2019-11-14 VITALS
RESPIRATION RATE: 16 BRPM | HEART RATE: 82 BPM | SYSTOLIC BLOOD PRESSURE: 146 MMHG | TEMPERATURE: 97 F | DIASTOLIC BLOOD PRESSURE: 86 MMHG | OXYGEN SATURATION: 100 %

## 2019-11-14 VITALS
TEMPERATURE: 98 F | OXYGEN SATURATION: 98 % | DIASTOLIC BLOOD PRESSURE: 79 MMHG | HEIGHT: 65 IN | SYSTOLIC BLOOD PRESSURE: 161 MMHG | RESPIRATION RATE: 17 BRPM | WEIGHT: 149.91 LBS | HEART RATE: 87 BPM

## 2019-11-14 PROCEDURE — 99282 EMERGENCY DEPT VISIT SF MDM: CPT

## 2019-11-14 PROCEDURE — 82962 GLUCOSE BLOOD TEST: CPT

## 2019-11-14 PROCEDURE — 99283 EMERGENCY DEPT VISIT LOW MDM: CPT

## 2019-11-14 NOTE — ED ADULT NURSE NOTE - NSFALLRSKUNASSIST_ED_ALL_ED
What Type Of Note Output Would You Prefer (Optional)?: Bullet Format How Severe Is Your Skin Lesion?: mild Has Your Skin Lesion Been Treated?: not been treated Is This A New Presentation, Or A Follow-Up?: Skin Lesion no

## 2019-11-14 NOTE — ED PROVIDER NOTE - ATTENDING CONTRIBUTION TO CARE
64M, uncomiciled, with htn, DM, biba with complaint of weakness. Pt found sleeping on a bench and EMS called. Pt denies any complaints, but stated thought he could become weak so they brought him to hospital. Denies cp, sob, abd pain, f/c, n/v/d, or any other complaints. Denies any recent falls or trauma. Denies etoh or drug use.    PE: NAD, NCAT, MMM, Trachea midline, Normal conjunctiva, lungs CTAB, S1/S2 RRR, Normal perfusion, 2+ radial pulses bilat, Abdomen Soft, NTND, No rebound/guarding, No LE edema, No deformity of extremities, No rashes,  No focal motor or sensory deficits.     Pt appears well. Will check FS. Involve SW to help with placement in shelter. - Chaz Wills MD

## 2019-11-14 NOTE — ED PROVIDER NOTE - PATIENT PORTAL LINK FT
You can access the FollowMyHealth Patient Portal offered by [facility] by registering at the following website: [email or web address]. By joining [employer]’s FollowMyHealth portal, you will also be able to view your health information using other applications (apps) compatible with our system.

## 2019-11-14 NOTE — ED PROVIDER NOTE - NSFOLLOWUPINSTRUCTIONS_ED_ALL_ED_FT
Please follow up with your primary care physician in 24-48 hours  Please come back if any of the following: Fever, chest pain, abdominal pain, shortness of breath, weakness or any major concern

## 2019-11-14 NOTE — CHART NOTE - NSCHARTNOTEFT_GEN_A_CORE
EMERGENCY : SW consulted by medical team due to patient being undomiciled and medically cleared for discharge. LMSW reviewed patients chart. As per chart review patient is a 64 year old, , English speaking, homeless male with a pmh of HTN, and diabetes. Patient BIBEMS due to concern for weakness.   LMSW met with patient at bedside and introduced self and role to which patient verbalized understanding. Patient alert and oriented x4 at this time. Patient states that he was coming from [place] to [place] via bus when he fell asleep at the bus stop and was brought in by EMS. Patient states that sometimes his parents and sister allow him to reside at home with them at [address] [place] [address], [id number]. Patient unable to recall family member’s phone numbers at this time resulting in patient unable to provide emergency contact to LMSW. Patient states that when he cannot stay with them he either goes to a drop-in shelter in [place] or wherever he can find a place to stay. Patient states he is primarily in the [place]. LMSW provided education on drop-in shelter in [place]. LMSW provided patient with information regarding [facility] drop-in shelters. Patient receptive to resources. Patient states that he has Medicaid and Medicare insurance benefits but does not know his ID number. Patient provided LMSW with social security number to look up insurance benefits. LMSW with the assistance of the [employer] ED rep was able to locate and confirm that patient does have Medicaid Insurance benefits ID # [id number]. LMSW provided patient with ID number for future use. LMSW also provided patient with clinics to f/u with medical care. Patient states upon discharge he will take public transportation to the [place] drop-in shelter. Patient states he has no resources for transportation at this time. LMSW provided patient with metro card which is good for 2 trips, card # [id number]. Patient states he is familiar with public transit system. Patient thankful for resources provided and has no other needs or concerns at this time. LMSW provided patient with contact information and ensured ongoing SW availability. SW to remain available for any further needs.

## 2019-11-14 NOTE — ED PROVIDER NOTE - PHYSICAL EXAMINATION
Gen: AAOx3, malodorous   Head: NCAT  HEENT: EOMI, oral mucosa moist, normal conjunctiva  Lung: CTAB, no respiratory distress,  speaking in full sentences  CV: RRR, no murmurs, rubs or gallops  Abd: soft, NTND, no guarding  MSK: no visible deformities           Strength: 5/5 UE/LE B/L  Neuro: No focal sensory or motor deficits             Steady gait  Skin: Warm, well perfused, no rash  Psych: normal affect.   ~Josesito Mcdermott M.D. Resident

## 2019-11-14 NOTE — ED PROVIDER NOTE - NS ED ROS FT
GENERAL: No fever or chills, EYES: no change in vision, HEENT: no trouble swallowing or speaking, CARDIAC: no chest pain, PULMONARY: no cough or SOB, GI: no abdominal pain, no nausea, no vomiting, no diarrhea or constipation, : No changes in urination, SKIN: no rashes, NEURO: no headache,  MSK: No joint pain ~Josesito Mcdermott M.D. Resident

## 2019-11-14 NOTE — ED PROVIDER NOTE - OBJECTIVE STATEMENT
64yM homeless  man with h/o  HTN, and diabetes BIBEMS due to concern for weakness. Patient states that he was sleeping on a bench and was awaken by EMS and was brought here because he told them he might get weak later on. No fever, chest pain, abd pain, n/v, urinary or bowel irregularities.

## 2019-11-14 NOTE — ED ADULT NURSE NOTE - OBJECTIVE STATEMENT
63 yo male presents to the ED via EMS. Was sleeping outside on a bench when bystanders called EMS. Was complaining of weakness to EMS but upon assessment, patient denies any weakness or pain. Per patient, PMH of HTN and DM. States he is homeless, was seen at [facility] yesterday. Denies headache, dizziness, vision changes, chest pain, shortness of breath, abdominal pain, nausea, vomiting, diarrhea, fevers, chills, dysuria, hematuria, recent illness travel or fall.

## 2019-11-14 NOTE — ED PROVIDER NOTE - CLINICAL SUMMARY MEDICAL DECISION MAKING FREE TEXT BOX
64yM homeless  man with h/o  HTN, and diabetes BIBEMS due to concern for weakness without any significant physical findings or symptoms. No blood work needed. WIll check fingerstick and restart 64yM homeless  man with h/o  HTN, and diabetes BIBEMS due to concern for weakness without any significant physical findings or symptoms. No blood work needed. WIll check fingerstick and reassess

## 2019-11-14 NOTE — ED ADULT NURSE NOTE - NSIMPLEMENTINTERV_GEN_ALL_ED
Implemented All Fall Risk Interventions:  [place] to call system. Call bell, personal items and telephone within reach. Instruct patient to call for assistance. Room bathroom lighting operational. Non-slip footwear when patient is off stretcher. Physically safe environment: no spills, clutter or unnecessary equipment. Stretcher in lowest position, wheels locked, appropriate side rails in place. Provide visual cue, wrist band, yellow gown, etc. Monitor gait and stability. Monitor for mental status changes and reorient to person, place, and time. Review medications for side effects contributing to fall risk. Reinforce activity limits and safety measures with patient and family.

## 2019-11-15 ENCOUNTER — EMERGENCY (EMERGENCY)
Facility: HOSPITAL | Age: 64
LOS: 1 days | Discharge: HOME | End: 2019-11-15
Attending: EMERGENCY MEDICINE
Payer: COMMERCIAL

## 2019-11-15 VITALS
HEART RATE: 98 BPM | SYSTOLIC BLOOD PRESSURE: 135 MMHG | OXYGEN SATURATION: 99 % | RESPIRATION RATE: 16 BRPM | DIASTOLIC BLOOD PRESSURE: 66 MMHG | TEMPERATURE: 97 F

## 2019-11-15 VITALS
HEART RATE: 103 BPM | RESPIRATION RATE: 18 BRPM | TEMPERATURE: 98 F | WEIGHT: 153 LBS | SYSTOLIC BLOOD PRESSURE: 136 MMHG | HEIGHT: 71 IN | DIASTOLIC BLOOD PRESSURE: 68 MMHG | OXYGEN SATURATION: 98 %

## 2019-11-15 PROCEDURE — 99283 EMERGENCY DEPT VISIT LOW MDM: CPT

## 2019-11-15 PROCEDURE — 99284 EMERGENCY DEPT VISIT MOD MDM: CPT

## 2019-11-15 PROCEDURE — 82962 GLUCOSE BLOOD TEST: CPT

## 2019-11-15 RX ORDER — METFORMIN HYDROCHLORIDE 850 MG/1
1 TABLET ORAL
Qty: 14 | Refills: 0
Start: 2019-11-15 | End: 2019-11-28

## 2019-11-15 NOTE — ED PROVIDER NOTE - NSFOLLOWUPCLINICS_GEN_ALL_ED_FT
[facility] General Internal Medicine  General Internal Medicine  [address] [id number]  Phone: [phone]  Fax:   Follow Up Time:

## 2019-11-15 NOTE — ED PROVIDER NOTE - CLINICAL SUMMARY MEDICAL DECISION MAKING FREE TEXT BOX
63 yo m bibems due to feeling cold. pt no longer feels cold. pt denies all acute complaints. reports he is tired. physical exam benign. vss, no hypothermia. fs. dc 65 yo m bibems due to feeling cold. pt no longer feels cold. pt denies all acute complaints. reports he is tired. physical exam benign. vss, no hypothermia. fs. will send metformin to pharmacy as pt will be unable to store insulin. dc pending [facility] in am. 65 yo m bibems due to feeling cold. pt no longer feels cold. pt denies all acute complaints. reports he is tired. physical exam benign. vss, no hypothermia. fs. will send metformin to pharmacy as pt will be unable to store insulin. dc.

## 2019-11-15 NOTE — ED PROVIDER NOTE - PATIENT PORTAL LINK FT
You can access the FollowMyHealth Patient Portal offered by [facility] by registering at the following website: [email or web address]. By joining [employer]’s FollowMyHealth portal, you will also be able to view your health information using other applications (apps) compatible with our system.

## 2019-11-15 NOTE — ED PROVIDER NOTE - NSFOLLOWUPINSTRUCTIONS_ED_ALL_ED_FT
1) Please follow up with your Primary Care Provider in 24-48 hours  2) Seek immediate medical care for any new or returning symptoms including but not limited high fevers, difficulty breathing  3) Take Metformin 500 mg once a day

## 2019-11-15 NOTE — ED PROVIDER NOTE - ATTENDING CONTRIBUTION TO CARE
Afebrile. Awake and Alert. Unkempt. CN II-XII grossly intact. Moves all extremities without lateralization.    Pt seen by KE[facility] for shelter yesterday in ED. Returns today b/c it is cold outside. No complaints. Blood glucose elevated. Pt used to be on insulin. Given homeless situation unlikely to be able to continue on insulin. Offered to send Metformin to [facility]. Advised pt to f/u medical clinic. Pt agreeable. Will see  for transport.

## 2019-11-15 NOTE — ED PROVIDER NOTE - PHYSICAL EXAMINATION
General: nad  HEENT: EOMI, PERRLA, no lesions on the lips or on oral mucosa, normal external ear, poor dentition  Neck: supple, no lymphadenopathy, full range of motion, no nuchal rigidity  CV: RRR, normal S1 and S2 with no murmur, capillary refill less than two seconds  Resp: lungs CTA b/l, good aeration bilaterally, symmetric chest wall   Abd: non-distended, soft, non-tender, no guarding/rebound  : no CVA tenderness  MSK: full range of motion, no cyanosis, no edema, no clubbing, no immobility  Neuro: CN II-XII grossly intact, muscle strength 5/5 in all extremities, normal gait

## 2019-11-15 NOTE — ED PROVIDER NOTE - OBJECTIVE STATEMENT
65 yo m pmh htn, DM, BIBEMS due to complaint of feeling "cold". pt reports he is homeless, was cold since "sun went down". reports he no longer feels cold since being picked up from EMS. pt denies all acute complaints. denies taking all medications. pt was seen and eval at [facility] yesterday.

## 2019-11-15 NOTE — ED ADULT NURSE NOTE - OBJECTIVE STATEMENT
65 y/o male A&Ox3 presents to ED via EMS after being picked up at [facility] by EMS for being "cold". Pt was seen in ED yesterday and was sent home. Pt skin warm to touch, dressed in layers of clothing, no acute distress noted at this time. Non labored respirations, no use of accessory muscles, no cough, wheezing or edema noted. No fevers, chills, dysuria, n/v/d. Denies CP, SOB, weakness, numbness and tingling. Safety measures maintained with side rails and bed in low position.

## 2019-11-15 NOTE — ED PROVIDER NOTE - NS ED ROS FT
GENERAL: No fever or chills, //             EYES: no change in vision, //             HEENT: no trouble swallowing or speaking, //             CARDIAC: no chest pain, //              PULMONARY: no cough or SOB, //             GI: no abdominal pain, no nausea or no vomiting, no diarrhea or constipation, //             : No changes in urination,  //            SKIN: no rashes,  //            NEURO: no headache,  //             MSK: No joint pain otherwise as HPI or negative. ~Juan Mcfarlane DO PGY2

## 2019-11-17 ENCOUNTER — EMERGENCY (EMERGENCY)
Facility: HOSPITAL | Age: 64
LOS: 0 days | Discharge: HOME | End: 2019-11-17
Attending: EMERGENCY MEDICINE
Payer: COMMERCIAL

## 2019-11-17 VITALS
HEART RATE: 106 BPM | DIASTOLIC BLOOD PRESSURE: 83 MMHG | RESPIRATION RATE: 16 BRPM | OXYGEN SATURATION: 97 % | WEIGHT: 153 LBS | HEIGHT: 71 IN | TEMPERATURE: 97 F | SYSTOLIC BLOOD PRESSURE: 137 MMHG

## 2019-11-17 DIAGNOSIS — Z59.0 HOMELESSNESS: ICD-10-CM

## 2019-11-17 DIAGNOSIS — F25.9 SCHIZOAFFECTIVE DISORDER, UNSPECIFIED: ICD-10-CM

## 2019-11-17 DIAGNOSIS — R10.9 UNSPECIFIED ABDOMINAL PAIN: ICD-10-CM

## 2019-11-17 DIAGNOSIS — E11.9 TYPE 2 DIABETES MELLITUS WITHOUT COMPLICATIONS: ICD-10-CM

## 2019-11-17 DIAGNOSIS — Z71.1 PERSON WITH FEARED HEALTH COMPLAINT IN WHOM NO DIAGNOSIS IS MADE: ICD-10-CM

## 2019-11-17 PROCEDURE — 99284 EMERGENCY DEPT VISIT MOD MDM: CPT

## 2019-11-17 SDOH — ECONOMIC STABILITY - HOUSING INSECURITY: HOMELESSNESS: Z59.0

## 2019-11-17 NOTE — ED PROVIDER NOTE - OBJECTIVE STATEMENT
65 yo M with cc of abd pain.  Pt. now admits that it's very cold outside and he's homeless and he wanted food and a place to sleep.  Pt. asks if he can rest here for a bit.    ROS: negative for fever, cough, headache, chest pain, shortness of breath, abd pain, nausea, vomiting, diarrhea, rash, paresthesia, and weakness--all other systems reviewed are negative.   PMH: DM, schizoaffective, undomiciled; Meds: Denies; SH: Denies smoking/drinking/drug use

## 2019-11-17 NOTE — ED ADULT NURSE NOTE - OBJECTIVE STATEMENT
pt is here for following up.  pt stated that slept and woke up, it was too cold, wanted to go to hospital, denied pain or sob, denied fever or chills, pt is eating at this time.

## 2019-11-17 NOTE — ED PROVIDER NOTE - PHYSICAL EXAMINATION
Vitals: tachy at 106, otherwise WNL  Gen: AAOx3, NAD, sitting comfortably in stretcher, calm, cooperative, non-toxic, foul-smelling, luggage at bedside   Head: ncat, perrla, eomi b/l  Neck: supple, no lymphadenopathy, no midline deviation  Heart: rrr, no m/r/g  Lungs: CTA b/l, no rales/ronchi/wheezes  Abd: soft, nontender, non-distended, no rebound or guarding  Ext: no clubbing/cyanosis/edema  Neuro: sensation and muscle strength intact b/l, steady gait

## 2019-11-17 NOTE — ED PROVIDER NOTE - CARE PROVIDER_API CALL
Rolando Sumner)  Internal Medicine  [address], [address]  [place] [address]  Phone: [phone]  Fax: [phone]  Follow Up Time: 4-6 Days

## 2019-11-17 NOTE — ED PROVIDER NOTE - PATIENT PORTAL LINK FT
You can access the FollowMyHealth Patient Portal offered by [facility] by registering at the following website: [email or web address]. By joining [employer]’s FollowMyHealth portal, you will also be able to view your health information using other applications (apps) compatible with our system.

## 2020-10-29 NOTE — ED ADULT NURSE NOTE - CINV DISCH TEACH PARTICIP
Patient Dupixent Counseling: I discussed with the patient the risks of dupilumab including but not limited to eye infection and irritation, cold sores, injection site reactions, worsening of asthma, allergic reactions and increased risk of parasitic infection.  Live vaccines should be avoided while taking dupilumab. Dupilumab will also interact with certain medications such as warfarin and cyclosporine. The patient understands that monitoring is required and they must alert us or the primary physician if symptoms of infection or other concerning signs are noted.

## 2020-12-03 NOTE — ED ADULT NURSE NOTE - MODE OF DISCHARGE
"Chief Complaint   Patient presents with   • Aortic Atherosclerosis     F/V Dx: Mild mitral regurgitation   • HTN (Controlled)   • Dyslipidemia       Subjective:   Almaz Samuel is a 65 y.o. female who presents today for follow-up of her history of FH with stroke    Is been doing okay over the last year she is taking the statins intermittently recent LDL is better than prior she reports is related to diet    Past Medical History:   Diagnosis Date   • Anesthesia 02-14-11    PO N/V, \"slow to come out\"   • Aortic atherosclerosis (HCC)    • Arthritis 02-14-11    spine   • Backpain 02-14-11    neck and abd. also,    • Breath shortness     with exertion   • Bronchitis 05/2018   • Cancer (HCC) 07/18/2018    Skin - 15 years ago.   • Cancer (HCC)     April/May 2018   • Diverticulitis    • Endometriosis of uterus    • Familial hypercholesteremia    • Fusion of spine 2014   • ROBERT (generalized anxiety disorder) 6/15/2017   • H/O fall     when in hospital  with low O2 sats   • H/O: CVA (cerebrovascular accident) 8/22/2014    Complex event associated with apparent medication reaction but with MRI suggesting possible multiple small infarcts of various ages, [facility]   • Hair loss 12/6/2018   • Heart burn    • Hiatus hernia syndrome     not repaired   • High cholesterol    • HTN, goal below 130/80 10/24/2011   • Infectious disease      Hep C +   • Lung collapse 02-14-11    right lung 1/4 collpsed    • Major depressive disorder with single episode, in full remission ([facility]) 10/24/2011   • Mixed hyperlipidemia 12/27/2011   • Moderate major depression ([facility]) 10/24/2011   • MRSA exposure 8/2014    while in hospital   • Post-menopause on HRT (hormone replacement therapy) 12/27/2011   • PPD positive 10/24/2011    exposed as a child, told not active   • Scoliosis    • Sleep apnea 6/2015    CPAP   • Snoring    • Unspecified vitamin D deficiency 9/24/2012     Past Surgical History:   Procedure Laterality Date   • NISSEN FUNDOPLICATION " LAPAROSCOPIC  7/25/2018    Procedure: NISSEN FUNDOPLICATION LAPAROSCOPIC;  Surgeon: Kenji Garcia M.D.;  Location: SURGERY [facility];  Service: General   • NISSEN FUNDOPLICATION LAPAROSCOPIC N/A 6/30/2015    Procedure: NISSEN FUNDOPLICATION LAPAROSCOPIC;  Surgeon: Kenji Garcia M.D.;  Location: SURGERY SAME DAY [facility] ORS;  Service:    • GASTROSCOPY-ENDO  6/24/2015    Procedure: GASTROSCOPY-ENDO;  Surgeon: Kenji Garcia M.D.;  Location: ENDOSCOPY [facility];  Service:    • CARPAL TUNNEL RELEASE  11/14/2012    Performed by Holly Martin M.D. at SURGERY [facility]   • LOW ANTERIOR RESECTION LAPAROSCOPIC  3/2/2011    Performed by KENJI GARCIA at SURGERY [facility]   • CERVICAL DISK AND FUSION ANTERIOR  6/22/2010    Performed by JOSEPH DARLING at SURGERY [facility]   • TUBAL LIGATION  1988   • TONSILLECTOMY AND ADENOIDECTOMY  1959   • APPENDECTOMY     • GYN SURGERY      partial hysterectomy     Family History   Problem Relation Age of Onset   • Diabetes Mother    • Cancer Mother 82        breast cancer   • Lung Disease Mother         copd   • Hyperlipidemia Mother    • Hypertension Mother    • Cancer Father         Laryngeal CA   • Heart Disease Father 50        CAD with bypasses x 3   • Heart Attack Father    • Hyperlipidemia Father    • Hypertension Father    • Diabetes Sister         type II diabetes   • Diabetes Other    • Heart Disease Other    • Hypertension Other    • Lung Disease Other      Social History     Socioeconomic History   • Marital status:      Spouse name: Not on file   • Number of children: Not on file   • Years of education: Not on file   • Highest education level: Not on file   Occupational History   • Not on file   Social Needs   • Financial resource strain: Not on file   • Food insecurity     Worry: Not on file     Inability: Not on file   • Transportation needs     Medical: Not on file     Non-medical: Not on file   Tobacco Use   • Smoking status: Former  "Smoker     Packs/day: 0.30     Years: 5.00     Pack years: 1.50     Types: Cigarettes     Quit date: 1975     Years since quittin.9   • Smokeless tobacco: Never Used   • Tobacco comment: quit    Substance and Sexual Activity   • Alcohol use: No     Alcohol/week: 0.0 oz   • Drug use: No   • Sexual activity: Yes     Partners: Male     Comment: , accounting at [employer]   Lifestyle   • Physical activity     Days per week: Not on file     Minutes per session: Not on file   • Stress: Not on file   Relationships   • Social connections     Talks on phone: Not on file     Gets together: Not on file     Attends Christianity service: Not on file     Active member of club or organization: Not on file     Attends meetings of clubs or organizations: Not on file     Relationship status: Not on file   • Intimate partner violence     Fear of current or ex partner: Not on file     Emotionally abused: Not on file     Physically abused: Not on file     Forced sexual activity: Not on file   Other Topics Concern   •  Service No   • Blood Transfusions No   • Caffeine Concern Not Asked   • Occupational Exposure Not Asked   • Hobby Hazards Not Asked   • Sleep Concern Not Asked   • Stress Concern Not Asked   • Weight Concern Not Asked   • Special Diet Not Asked   • Back Care Not Asked   • Exercise No   • Bike Helmet No   • Seat Belt Yes   • Self-Exams Yes   Social History Narrative   • Not on file     Allergies   Allergen Reactions   • Albumin      Other reaction(s): Other (See Comments)  \"egg intolerance\"  Reaction:stomach cramps,throwing up for 12 hours,cold sweats   • Seasonal Itching     Pt states eye irritation, pollen      Outpatient Encounter Medications as of 12/3/2020   Medication Sig Dispense Refill   • atorvastatin (LIPITOR) 40 MG Tab Take 1 Tab by mouth every day. 90 Tab 3   • LORazepam (ATIVAN) 1 MG Tab Take 1 Tab by mouth every bedtime for 90 days. 30 Tab 2   • citalopram (CELEXA) 40 MG Tab TAKE 1 " TABLET DAILY 90 Tab 2   • fluticasone (FLONASE) 50 MCG/ACT nasal spray Spray 2 Sprays in nose every day. INHALE 2 SPRAYS IN EACH NOSTRIL 48 g 11   • baclofen (LIORESAL) 10 MG Tab Take 1 Tab by mouth every bedtime. 90 Tab 3   • Artificial Tear Solution (TEARS NATURALE OP) Place 2 Drops in both eyes 2 times a day as needed.     • [DISCONTINUED] albuterol 108 (90 Base) MCG/ACT Aero Soln inhalation aerosol Inhale 2 Puffs by mouth every four hours as needed. (Patient not taking: Reported on 12/3/2020) 18 g 0   • [DISCONTINUED] benzonatate (TESSALON) 200 MG capsule Take 1 Cap by mouth 3 times a day as needed for Cough. (Patient not taking: Reported on 12/3/2020) 60 Cap 0   • [DISCONTINUED] methylPREDNISolone (MEDROL DOSEPAK) 4 MG Tablet Therapy Pack Take 1 Tab by mouth See Admin Instructions. (Patient not taking: Reported on 12/3/2020) 21 Tab 0   • [DISCONTINUED] meclizine (ANTIVERT) 12.5 MG Tab Take 1 Tab by mouth 3 times a day as needed. (Patient not taking: Reported on 9/26/2020) 30 Tab 0   • [DISCONTINUED] simvastatin (ZOCOR) 10 MG Tab Take 1 Tab by mouth every evening. 90 Tab 3   • [DISCONTINUED] Omega-3 Fatty Acids (FISH OIL) 1000 MG Cap capsule Take 1 Cap by mouth 2 Times a Day.     • [DISCONTINUED] ondansetron (ZOFRAN ODT) 4 MG TABLET DISPERSIBLE Take 1 Tab by mouth every 6 hours as needed for Nausea. (Patient not taking: Reported on 12/3/2020) 10 Tab 0     No facility-administered encounter medications on file as of 12/3/2020.      Review of Systems   Constitutional: Negative for chills and fever.   HENT: Negative for sore throat.    Eyes: Negative for blurred vision.   Respiratory: Negative for cough and shortness of breath.    Cardiovascular: Negative for chest pain, palpitations, claudication, leg swelling and PND.   Gastrointestinal: Negative for abdominal pain and nausea.   Musculoskeletal: Negative for falls and joint pain.   Skin: Negative for rash.   Neurological: Negative for dizziness, focal weakness  "and weakness.   Endo/Heme/Allergies: Does not bruise/bleed easily.        Objective:   /68 (BP Location: Left arm, Patient Position: Sitting, BP Cuff Size: Adult)   Pulse 95   Ht 1.499 m (4' 11\")   Wt 69.9 kg (154 lb)   SpO2 96%   BMI 31.10 kg/m²     Physical Exam   Constitutional: No distress.   HENT:   Patient wearing a mask due to COVID precautions   Eyes: No scleral icterus.   Neck: No JVD present.   Cardiovascular: Normal rate and normal heart sounds. Exam reveals no gallop and no friction rub.   No murmur heard.  Pulmonary/Chest: No respiratory distress. She has no wheezes. She has no rales.   Abdominal: Soft. Bowel sounds are normal.   Musculoskeletal:         General: No edema.   Neurological: She is alert.   Skin: No rash noted. She is not diaphoretic.   Psychiatric: She has a normal mood and affect.     We reviewed in person the most recent labs  Recent Results (from the past 4032 hour(s))   Lipid Profile    Collection Time: 08/21/20  7:06 AM   Result Value Ref Range    Cholesterol,Tot 229 (H) 100 - 199 mg/dL    Triglycerides 131 0 - 149 mg/dL    HDL 57 >=40 mg/dL     (H) <100 mg/dL   Comp Metabolic Panel    Collection Time: 08/21/20  7:06 AM   Result Value Ref Range    Sodium 142 135 - 145 mmol/L    Potassium 4.2 3.6 - 5.5 mmol/L    Chloride 103 96 - 112 mmol/L    Co2 26 20 - 33 mmol/L    Anion Gap 13.0 7.0 - 16.0    Glucose 97 65 - 99 mg/dL    Bun 15 8 - 22 mg/dL    Creatinine 0.76 0.50 - 1.40 mg/dL    Calcium 9.4 8.5 - 10.5 mg/dL    AST(SGOT) 19 12 - 45 U/L    ALT(SGPT) 21 2 - 50 U/L    Alkaline Phosphatase 81 30 - 99 U/L    Total Bilirubin 0.5 0.1 - 1.5 mg/dL    Albumin 4.5 3.2 - 4.9 g/dL    Total Protein 7.1 6.0 - 8.2 g/dL    Globulin 2.6 1.9 - 3.5 g/dL    A-G Ratio 1.7 g/dL   FASTING STATUS    Collection Time: 08/21/20  7:06 AM   Result Value Ref Range    Fasting Status Fasting    ESTIMATED GFR    Collection Time: 08/21/20  7:06 AM   Result Value Ref Range    GFR If  " >60 >60 mL/min/1.73 m 2    GFR If Non African American >60 >60 mL/min/1.73 m 2       Assessment:     1. Aortic atherosclerosis (HCC)     2. Dyslipidemia  Lipid Profile   3. Familial hypercholesteremia  Lipid Profile   4. H/O: CVA (cerebrovascular accident)     5. HTN, goal below 130/80     6. Mild mitral regurgitation         Medical Decision Making:  Today's Assessment / Status / Plan:     It was my pleasure to meet with Ms. Samuel.    Blood pressure is well controlled.  We specifically assessed the labs on hypertension treatment    She is on appropriate statin.  We will have her add back atorvastatin perhaps every other day    I will see Ms. Samuel back in 1 year time and encouraged her to follow up with us over the phone or electronically using my MyChart as issues arise.    It is my pleasure to participate in the care of Ms. Samuel.  Please do not hesitate to contact me with questions or concerns.    Leonel Bridges MD PhD [facility]  Cardiologist [facility]    Please note that this dictation was created using voice recognition software. There may be errors I did not discover before finalizing the note.        Ambulatory

## 2020-12-07 NOTE — ED ADULT NURSE NOTE - PMH
Diabetes    Schizo-affective schizophrenia
Patient/Caregiver provided printed discharge information.

## 2021-08-03 NOTE — ED PROVIDER NOTE - NS ED ROS FT
Oriented - self; Oriented - place; Oriented - time Constitutional: See HPI.  Eyes: No visual changes, eye pain or discharge. No Photophobia  ENMT:  No neck pain or stiffness. No limited ROM  Cardiac: No SOB or edema. No chest pain with exertion.  Respiratory: No cough or respiratory distress. No hemoptysis. No history of asthma or RAD.  GI: No nausea, vomiting, diarrhea or abdominal pain.  : No dysuria, frequency or burning. No Discharge  MS: No myalgia, muscle weakness, joint pain or back pain.  Neuro: No headache or weakness. No LOC.  Skin: No skin rash.  Except as documented in the HPI, all other systems are negative.

## 2021-12-08 ENCOUNTER — EMERGENCY (EMERGENCY)
Facility: HOSPITAL | Age: 66
LOS: 0 days | Discharge: HOME | End: 2021-12-08
Attending: EMERGENCY MEDICINE | Admitting: EMERGENCY MEDICINE
Payer: MEDICARE

## 2021-12-08 VITALS
OXYGEN SATURATION: 96 % | HEIGHT: 71 IN | DIASTOLIC BLOOD PRESSURE: 76 MMHG | RESPIRATION RATE: 18 BRPM | WEIGHT: 153 LBS | SYSTOLIC BLOOD PRESSURE: 134 MMHG | TEMPERATURE: 98 F | HEART RATE: 72 BPM

## 2021-12-08 DIAGNOSIS — E11.9 TYPE 2 DIABETES MELLITUS WITHOUT COMPLICATIONS: ICD-10-CM

## 2021-12-08 DIAGNOSIS — M79.645 PAIN IN LEFT FINGER(S): ICD-10-CM

## 2021-12-08 PROCEDURE — 73140 X-RAY EXAM OF FINGER(S): CPT | Mod: 26,LT

## 2021-12-08 PROCEDURE — 99283 EMERGENCY DEPT VISIT LOW MDM: CPT

## 2021-12-08 NOTE — ED PROVIDER NOTE - CLINICAL SUMMARY MEDICAL DECISION MAKING FREE TEXT BOX
patient presents for evaluation of finger pain with no acute injury.  he denies any redness or fever he denies any he has from of motion on exam radial pulses 2 += cap refill is normal we obtained xrays which are normal  I will discharge at this time

## 2021-12-08 NOTE — ED PROVIDER NOTE - NSFOLLOWUPINSTRUCTIONS_ED_ALL_ED_FT
Jammed Finger    A jammed finger is an injury to the ligaments that support your finger bones. Ligaments are strong bands of tissue that connect bones and keep them in place. This injury happens when the ligaments are stretched beyond their normal range of motion (sprained).    CAUSES  A jammed finger is caused by a hard direct hit to the tip of your finger that pushes your finger toward your hand.     RISK FACTORS  This injury is more likely to happen if you play sports.    SYMPTOMS  Symptoms of a jammed finger include:    Pain.  Swelling.  Discoloration and bruising around the joint.  Difficulty bending or straightening the finger.  Not being able to use the finger normally.    DIAGNOSIS  A jammed finger is diagnosed with a medical history and physical exam. You may also have X-rays taken to check for a broken bone (fracture).     TREATMENT  Treatment for a jammed finger may include:    Wearing a splint.  Taping the injured finger to the fingers beside it (rene taping).   Medicines used to treat pain.    Depending on the type of injury, you may have to do exercises after your finger has begun to heal. This helps you regain strength and mobility in the finger.     HOME CARE INSTRUCTIONS  Take medicines only as directed by your health care provider.   Apply ice to the injured area:    Put ice in a plastic bag.    Place a towel between your skin and the bag.    Leave the ice on for 20 minutes, 2–3 times per day.   Raise the injured area above the level of your heart while you are sitting or lying down.  Wear the splint or tape as directed by your health care provider. Remove it only as directed by your health care provider.   Rest your finger until your health care provider says you can move it again. Your finger may feel stiff and painful for a while.  Perform strengthening exercises as directed by your health care provider. It may help to start doing these exercises with your hand in a bowl of warm water.  Keep all follow-up visits as directed by your health care provider. This is important.    SEEK MEDICAL CARE IF:  You have pain or swelling that is getting worse.  Your finger feels cold.  Your finger looks out of place at the joint (deformity).  You still cannot extend your finger after treatment.  You have a fever.    SEEK IMMEDIATE MEDICAL CARE IF:  Even after loosening your splint, your finger:  Is very red and swollen.  Is white or blue.  Feels tingly or becomes numb.    ADDITIONAL NOTES AND INSTRUCTIONS    Please follow up with your Primary MD in 24-48 hr.  Seek immediate medical care for any new/worsening signs or symptoms.

## 2021-12-08 NOTE — ED PROVIDER NOTE - PHYSICAL EXAMINATION
Physical Exam    Vital Signs: I have reviewed the initial vital signs.  Constitutional: well-nourished, appears stated age, no acute distress  Eyes: Conjunctiva pink, Sclera jaki  Cardiovascular: S1 and S2, regular rate, regular rhythm, well-perfused extremities, radial pulses equal and 2+  Respiratory: unlabored respiratory effort, clear to auscultation bilaterally no wheezing, rales and rhonchi  Gastrointestinal: soft, non-tender abdomen, no pulsatile mass, normal bowl sounds  Musculoskeletal: chronic deformity to left 5th distal digit no ttp.   Integumentary: warm, dry, no rash  Neurologic: awake, alert, nvi

## 2021-12-08 NOTE — ED ADULT NURSE NOTE - SUICIDE SCREENING DEPRESSION
Return visit 3 months  Influenza vaccine declined    Saline nasal drops followed by syringe aspiration  Acetaminophen or Ibuprofen for fever if needed    PATIENT INFORMATION    Anticipatory guidance:  Avoid cow's milk until 12 months of age  Avoid potential choking hazards (large, spherical, or coin shaped foods) unit  Avoid small toys (choking hazard)  Call for decreased feeding, fever  Car seat issues, including proper placement  Never leave unattended except in crib  Safe sleep furniture  Sleep face up to decrease the chances of SIDS  Smoke detectors      Follow-Up  - Return for your 9 month well child visit.    6 months old Health and Safety Tips - The following hyperlinks are available to access via MyChart    Bright Futures 6 Months Old Parent Education    Futuros Brillantes 6 meses de edad (Educación para los padres) - Español      Acetaminophen Childrens Elixir 160mg/ 5 ml (1 teaspoon) Shake well before using. May give every 4-6 hours as needed.  Wt. 12-17 1/2 tsp (2.5 ml)  Wt. 18-23 3/4 tsps. (3.75 ml)  Wt. 24-35 1 tsps. (5 ml)  Wt. 36-47 1.5 tsps.  (7.5 ml)  Wt. 48-59 2 tsps. (10 ml)    Wt. 60-71 2.5 tsps. (12.5 ml)  Wt. 72-95 3 tsps. (15 ml)  Wt. 96+ 4 tsps. (20 ml)    * DO NOT EXCEED FIVE DOSES OF ACETAMINOPHEN IN A 24 HOUR PERIOD.    *Ibuprofen is not to be used under 6 months of age.    Ibuprofen Infant Drops 50 mg (1.25 ml) per dropper/ 1.875 ml (50 mg) per syringe.  Shake well before using.  May give every 6-8 hours as needed.  Wt. <12 Consult  Wt. 12-17 1 dropper OR 2/3 syringe (1.25 ml)  Wt. 18-23 1 1/2 droppers OR  1 syringe (1.875 ml)  Wt. 24-35 2 droppers OR  1 1/3 syringe (2.5 ml)  Wt. 36-47 3 droppers OR 2 syringes (3.75 ml)  Wt. 48-59 4  Droppers OR 2 2/3 syringes (5 ml)     *DO NOT EXCEED FOUR DOSES OF IBUPROFEN IN A 24 HOUR PERIOD    Ibuprofen Children's Liquid 100mg/5 ml (1 tsp) Shake well before using. May give every 6-8 hours as needed.  Wt. 12-17 1/2 tsp  Wt. 18-23 3/4 tsp.  Wt. 24-35 1  tsp.  Wt. 36-47 1 1/2 tsps.  Wt. 48-59 2 tsps.  Wt. 60-71 2 1/2 tsps.  Wt. 72-95 3 tsps.  Wt. 96+ 4 tsps.    *DO NOT EXCEED FOUR DOSES OF IBUPROFEN IN A 24 HOUR PERIOD      Additional Educational Resources:  For additional resources regarding your symptoms, diagnosis, or further health information, please visit [facility] Online Health Resources section in [email or web address].    PATIENT INFORMATION    Anticipatory guidance:  Avoid cow's milk until 12 months of age  Avoid potential choking hazards (large, spherical, or coin shaped foods) unit  Avoid small toys (choking hazard)  Call for decreased feeding, fever  Car seat issues, including proper placement  Never leave unattended except in crib  Safe sleep furniture  Sleep face up to decrease the chances of SIDS  Smoke detectors      Follow-Up  - Return for your 9 month well child visit.    6 months old Health and Safety Tips - The following hyperlinks are available to access via [email or web address]    Bright Futures 6 Months Old Parent Education    Futuros Brillantes 6 meses de edad (Educación para los padres) - Español      Acetaminophen Childrens Elixir 160mg/ 5 ml (1 teaspoon) Shake well before using. May give every 4-6 hours as needed.  Wt. 12-17 1/2 tsp (2.5 ml)  Wt. 18-23 3/4 tsps. (3.75 ml)  Wt. 24-35 1 tsps. (5 ml)  Wt. 36-47 1.5 tsps.  (7.5 ml)  Wt. 48-59 2 tsps. (10 ml)    Wt. 60-71 2.5 tsps. (12.5 ml)  Wt. 72-95 3 tsps. (15 ml)  Wt. 96+ 4 tsps. (20 ml)    * DO NOT EXCEED FIVE DOSES OF ACETAMINOPHEN IN A 24 HOUR PERIOD.    *Ibuprofen is not to be used under 6 months of age.    Ibuprofen Infant Drops 50 mg (1.25 ml) per dropper/ 1.875 ml (50 mg) per syringe.  Shake well before using.  May give every 6-8 hours as needed.  Wt. <12 Consult  Wt. 12-17 1 dropper OR 2/3 syringe (1.25 ml)  Wt. 18-23 1 1/2 droppers OR  1 syringe (1.875 ml)  Wt. 24-35 2 droppers OR  1 1/3 syringe (2.5 ml)  Wt. 36-47 3 droppers OR 2 syringes (3.75 ml)  Wt. 48-59 4  Droppers OR 2 2/3 syringes (5 ml)     *DO NOT  EXCEED FOUR DOSES OF IBUPROFEN IN A 24 HOUR PERIOD    Ibuprofen Children's Liquid 100mg/5 ml (1 tsp) Shake well before using. May give every 6-8 hours as needed.  Wt. 12-17 1/2 tsp  Wt. 18-23 3/4 tsp.  Wt. 24-35 1 tsp.  Wt. 36-47 1 1/2 tsps.  Wt. 48-59 2 tsps.  Wt. 60-71 2 1/2 tsps.  Wt. 72-95 3 tsps.  Wt. 96+ 4 tsps.    *DO NOT EXCEED FOUR DOSES OF IBUPROFEN IN A 24 HOUR PERIOD      Additional Educational Resources:  For additional resources regarding your symptoms, diagnosis, or further health information, please visit tthe Online Health Resources section in [employer].     Negative

## 2021-12-08 NOTE — ED PROVIDER NOTE - PATIENT PORTAL LINK FT
You can access the FollowMyHealth Patient Portal offered by [facility] by registering at the following website: [email or web address]. By joining [facility]’s FollowMyHealth portal, you will also be able to view your health information using other applications (apps) compatible with our system.

## 2021-12-08 NOTE — ED PROVIDER NOTE - ATTENDING CONTRIBUTION TO CARE
I was present for and supervised the key and critical aspects of the procedures performed during the care of the patient. patient presents for evaluation of finger pain with no acute injury.  he denies any redness or fever he denies any he has from of motion on exam radial pulses 2 += cap refill is normal we obtained xrays which are normal  I will discharge at this time

## 2022-03-24 NOTE — ED PROVIDER NOTE - OBJECTIVE STATEMENT
Pertinent PMH/PSH/FHx/SHx and Review of Systems contained within:  64m homeless, schizophrenia presents for respite from cold. says only he wants to rest here. "im fine, but can I rest?"   no cp, vomiting, sob, ha, fever  Fh and Sh not otherwise contributory  ROS otherwise negative non-distended/non-tender

## 2022-12-20 NOTE — ED PROVIDER NOTE - MEDICAL DECISION MAKING DETAILS
In my opinion, out patient treatment and follow up is appropriate. 50% of the time/able to follow single-step instructions 100% of the time/able to follow single-step instructions

## 2023-07-02 ENCOUNTER — INPATIENT (INPATIENT)
Facility: HOSPITAL | Age: 68
LOS: 3 days | Discharge: NURSING, REHAB OR LONG-TERM CARE | DRG: 637 | End: 2023-07-06
Attending: INTERNAL MEDICINE | Admitting: INTERNAL MEDICINE
Payer: MEDICARE

## 2023-07-02 VITALS
DIASTOLIC BLOOD PRESSURE: 65 MMHG | RESPIRATION RATE: 19 BRPM | SYSTOLIC BLOOD PRESSURE: 135 MMHG | OXYGEN SATURATION: 98 % | HEART RATE: 88 BPM

## 2023-07-02 DIAGNOSIS — E16.2 HYPOGLYCEMIA, UNSPECIFIED: ICD-10-CM

## 2023-07-02 LAB
ALBUMIN SERPL ELPH-MCNC: 4.6 G/DL — SIGNIFICANT CHANGE UP (ref 3.5–5.2)
ALP SERPL-CCNC: 83 U/L — SIGNIFICANT CHANGE UP (ref 30–115)
ALT FLD-CCNC: 20 U/L — SIGNIFICANT CHANGE UP (ref 0–41)
ANION GAP SERPL CALC-SCNC: 12 MMOL/L — SIGNIFICANT CHANGE UP (ref 7–14)
AST SERPL-CCNC: 18 U/L — SIGNIFICANT CHANGE UP (ref 0–41)
B-OH-BUTYR SERPL-SCNC: <0.2 MMOL/L — SIGNIFICANT CHANGE UP
BASE EXCESS BLDV CALC-SCNC: 4.2 MMOL/L — HIGH (ref -2–3)
BASOPHILS # BLD AUTO: 0.06 K/UL — SIGNIFICANT CHANGE UP (ref 0–0.2)
BASOPHILS NFR BLD AUTO: 0.7 % — SIGNIFICANT CHANGE UP (ref 0–1)
BILIRUB SERPL-MCNC: 0.2 MG/DL — SIGNIFICANT CHANGE UP (ref 0.2–1.2)
BLD GP AB SCN SERPL QL: SIGNIFICANT CHANGE UP
BUN SERPL-MCNC: 13 MG/DL — SIGNIFICANT CHANGE UP (ref 10–20)
CA-I SERPL-SCNC: 1.25 MMOL/L — SIGNIFICANT CHANGE UP (ref 1.15–1.33)
CALCIUM SERPL-MCNC: 10 MG/DL — SIGNIFICANT CHANGE UP (ref 8.4–10.5)
CHLORIDE SERPL-SCNC: 95 MMOL/L — LOW (ref 98–110)
CO2 SERPL-SCNC: 29 MMOL/L — SIGNIFICANT CHANGE UP (ref 17–32)
CREAT SERPL-MCNC: 0.7 MG/DL — SIGNIFICANT CHANGE UP (ref 0.7–1.5)
EGFR: 100 ML/MIN/1.73M2 — SIGNIFICANT CHANGE UP
EOSINOPHIL # BLD AUTO: 0.04 K/UL — SIGNIFICANT CHANGE UP (ref 0–0.7)
EOSINOPHIL NFR BLD AUTO: 0.4 % — SIGNIFICANT CHANGE UP (ref 0–8)
GAS PNL BLDV: 131 MMOL/L — LOW (ref 136–145)
GAS PNL BLDV: SIGNIFICANT CHANGE UP
GAS PNL BLDV: SIGNIFICANT CHANGE UP
GLUCOSE BLDC GLUCOMTR-MCNC: 103 MG/DL — HIGH (ref 70–99)
GLUCOSE BLDC GLUCOMTR-MCNC: 185 MG/DL — HIGH (ref 70–99)
GLUCOSE BLDC GLUCOMTR-MCNC: 34 MG/DL — CRITICAL LOW (ref 70–99)
GLUCOSE SERPL-MCNC: 35 MG/DL — CRITICAL LOW (ref 70–99)
HCO3 BLDV-SCNC: 32 MMOL/L — HIGH (ref 22–29)
HCT VFR BLD CALC: 40.9 % — LOW (ref 42–52)
HCT VFR BLDA CALC: 43 % — SIGNIFICANT CHANGE UP (ref 39–51)
HGB BLD CALC-MCNC: 14.4 G/DL — SIGNIFICANT CHANGE UP (ref 12.6–17.4)
HGB BLD-MCNC: 13.8 G/DL — LOW (ref 14–18)
IMM GRANULOCYTES NFR BLD AUTO: 0.7 % — HIGH (ref 0.1–0.3)
LACTATE BLDV-MCNC: 0.9 MMOL/L — SIGNIFICANT CHANGE UP (ref 0.5–2)
LIDOCAIN IGE QN: 11 U/L — SIGNIFICANT CHANGE UP (ref 7–60)
LYMPHOCYTES # BLD AUTO: 0.88 K/UL — LOW (ref 1.2–3.4)
LYMPHOCYTES # BLD AUTO: 9.8 % — LOW (ref 20.5–51.1)
MAGNESIUM SERPL-MCNC: 1.9 MG/DL — SIGNIFICANT CHANGE UP (ref 1.8–2.4)
MCHC RBC-ENTMCNC: 30 PG — SIGNIFICANT CHANGE UP (ref 27–31)
MCHC RBC-ENTMCNC: 33.7 G/DL — SIGNIFICANT CHANGE UP (ref 32–37)
MCV RBC AUTO: 88.9 FL — SIGNIFICANT CHANGE UP (ref 80–94)
MONOCYTES # BLD AUTO: 0.74 K/UL — HIGH (ref 0.1–0.6)
MONOCYTES NFR BLD AUTO: 8.3 % — SIGNIFICANT CHANGE UP (ref 1.7–9.3)
NEUTROPHILS # BLD AUTO: 7.16 K/UL — HIGH (ref 1.4–6.5)
NEUTROPHILS NFR BLD AUTO: 80.1 % — HIGH (ref 42.2–75.2)
NRBC # BLD: 0 /100 WBCS — SIGNIFICANT CHANGE UP (ref 0–0)
PCO2 BLDV: 59 MMHG — HIGH (ref 42–55)
PH BLDV: 7.34 — SIGNIFICANT CHANGE UP (ref 7.32–7.43)
PLATELET # BLD AUTO: 312 K/UL — SIGNIFICANT CHANGE UP (ref 130–400)
PMV BLD: 10.1 FL — SIGNIFICANT CHANGE UP (ref 7.4–10.4)
PO2 BLDV: 25 MMHG — SIGNIFICANT CHANGE UP
POTASSIUM BLDV-SCNC: 3.6 MMOL/L — SIGNIFICANT CHANGE UP (ref 3.5–5.1)
POTASSIUM SERPL-MCNC: 4 MMOL/L — SIGNIFICANT CHANGE UP (ref 3.5–5)
POTASSIUM SERPL-SCNC: 4 MMOL/L — SIGNIFICANT CHANGE UP (ref 3.5–5)
PROT SERPL-MCNC: 7 G/DL — SIGNIFICANT CHANGE UP (ref 6–8)
RBC # BLD: 4.6 M/UL — LOW (ref 4.7–6.1)
RBC # FLD: 12.2 % — SIGNIFICANT CHANGE UP (ref 11.5–14.5)
SAO2 % BLDV: 39.1 % — SIGNIFICANT CHANGE UP
SODIUM SERPL-SCNC: 136 MMOL/L — SIGNIFICANT CHANGE UP (ref 135–146)
TROPONIN T SERPL-MCNC: <0.01 NG/ML — SIGNIFICANT CHANGE UP
WBC # BLD: 8.94 K/UL — SIGNIFICANT CHANGE UP (ref 4.8–10.8)
WBC # FLD AUTO: 8.94 K/UL — SIGNIFICANT CHANGE UP (ref 4.8–10.8)

## 2023-07-02 PROCEDURE — 99285 EMERGENCY DEPT VISIT HI MDM: CPT

## 2023-07-02 PROCEDURE — 84484 ASSAY OF TROPONIN QUANT: CPT

## 2023-07-02 PROCEDURE — 84443 ASSAY THYROID STIM HORMONE: CPT

## 2023-07-02 PROCEDURE — 82607 VITAMIN B-12: CPT

## 2023-07-02 PROCEDURE — 70498 CT ANGIOGRAPHY NECK: CPT

## 2023-07-02 PROCEDURE — 71045 X-RAY EXAM CHEST 1 VIEW: CPT

## 2023-07-02 PROCEDURE — 84100 ASSAY OF PHOSPHORUS: CPT

## 2023-07-02 PROCEDURE — 86803 HEPATITIS C AB TEST: CPT

## 2023-07-02 PROCEDURE — 93005 ELECTROCARDIOGRAM TRACING: CPT

## 2023-07-02 PROCEDURE — 83735 ASSAY OF MAGNESIUM: CPT

## 2023-07-02 PROCEDURE — 82962 GLUCOSE BLOOD TEST: CPT

## 2023-07-02 PROCEDURE — 80048 BASIC METABOLIC PNL TOTAL CA: CPT

## 2023-07-02 PROCEDURE — 94640 AIRWAY INHALATION TREATMENT: CPT

## 2023-07-02 PROCEDURE — 82746 ASSAY OF FOLIC ACID SERUM: CPT

## 2023-07-02 PROCEDURE — 80061 LIPID PANEL: CPT

## 2023-07-02 PROCEDURE — 80053 COMPREHEN METABOLIC PANEL: CPT

## 2023-07-02 PROCEDURE — 82803 BLOOD GASES ANY COMBINATION: CPT

## 2023-07-02 PROCEDURE — 92610 EVALUATE SWALLOWING FUNCTION: CPT | Mod: GN

## 2023-07-02 PROCEDURE — 0042T: CPT

## 2023-07-02 PROCEDURE — 83036 HEMOGLOBIN GLYCOSYLATED A1C: CPT

## 2023-07-02 PROCEDURE — 93010 ELECTROCARDIOGRAM REPORT: CPT

## 2023-07-02 PROCEDURE — 95819 EEG AWAKE AND ASLEEP: CPT

## 2023-07-02 PROCEDURE — 70450 CT HEAD/BRAIN W/O DYE: CPT

## 2023-07-02 PROCEDURE — 70496 CT ANGIOGRAPHY HEAD: CPT

## 2023-07-02 PROCEDURE — 85025 COMPLETE CBC W/AUTO DIFF WBC: CPT

## 2023-07-02 PROCEDURE — 36415 COLL VENOUS BLD VENIPUNCTURE: CPT

## 2023-07-02 PROCEDURE — 83605 ASSAY OF LACTIC ACID: CPT

## 2023-07-02 RX ORDER — DEXTROSE 50 % IN WATER 50 %
25 SYRINGE (ML) INTRAVENOUS ONCE
Refills: 0 | Status: COMPLETED | OUTPATIENT
Start: 2023-07-02 | End: 2023-07-02

## 2023-07-02 RX ADMIN — Medication 25 MILLILITER(S): at 21:42

## 2023-07-02 NOTE — ED PROVIDER NOTE - PHYSICAL EXAMINATION
VITAL SIGNS: I have reviewed nursing notes and confirm.  CONSTITUTIONAL: ill-appearing, non-toxic, NAD, cachectic male  SKIN: Warm dry, normal skin turgor  HEAD: NCAT  EYES: EOMI, PERRLA, no scleral icterus  ENT: Dry mucous membranes, normal pharynx with no erythema or exudates  NECK: Supple; non tender. Full ROM. No cervical LAD  CARD: RRR, no murmurs, rubs or gallops  RESP: clear to ausculation b/l.  No rales, rhonchi, or wheezing.  ABD: soft, + BS, non-tender, non-distended, no rebound or guarding. No CVA tenderness  EXT: Full ROM, no bony tenderness, no pedal edema, no calf tenderness  NEURO: normal motor. normal sensory. CN II-XII intact. Cerebellar testing normal. Unable to assess gait.   PSYCH: Cooperative, appropriate. AxOx2.

## 2023-07-02 NOTE — ED ADULT NURSE NOTE - NSFALLHARMRISKINTERV_ED_ALL_ED
Assistance OOB with selected safe patient handling equipment if applicable/Assistance with ambulation/Communicate risk of Fall with Harm to all staff, patient, and family/Monitor gait and stability/Provide visual cue: red socks, yellow wristband, yellow gown, etc/Reinforce activity limits and safety measures with patient and family/Bed in lowest position, wheels locked, appropriate side rails in place/Call bell, personal items and telephone in reach/Instruct patient to call for assistance before getting out of bed/chair/stretcher/Non-slip footwear applied when patient is off stretcher/[place] to call system/Physically safe environment - no spills, clutter or unnecessary equipment/Purposeful Proactive Rounding/Room/bathroom lighting operational, light cord in reach

## 2023-07-02 NOTE — ED PROVIDER NOTE - NS_BEDUNITTYPES_ED_ALL_ED
Continue with ASA 81 mg PO Daily.   Hold BB for now 2/2 AVB  --> EP following   Continue AC on Coumadin  Daily PT/ INR   Increase activity as tolerated.   Encourage Chest PT / Pulmonary toileting and Incentive spirometry every 1 hour x 10 while awake.   Continue with PUD prophylaxis.   Shower daily.   D/C plan Subacute Rehab Friday  Plan of care discussed with attending MED/SURG

## 2023-07-02 NOTE — ED PROVIDER NOTE - OBJECTIVE STATEMENT
67-year-old male with past medical history of schizoaffective disorder, insulin-dependent diabetes, hyperlipidemia who presents for hypoglycemia.  Patient was brought in from [facility] for altered mental status secondary to hypoglycemia.  EMS gave him oral glucose tablets, fingerstick was still 35 in triage.  Patient AxO x1 on arrival.  Unknown if patient received too much insulin or when he received his insulin.  No fevers, chills, chest pain, shortness breath, nausea, vomiting, diarrhea, abdominal pain, weakness, numbness.  Given 1 amp of D50 in the ED, patient is now alert and oriented.

## 2023-07-02 NOTE — ED PROVIDER NOTE - ATTENDING CONTRIBUTION TO CARE
67-year-old male past medical history as documented presenting with altered mental status from nursing home.  On EMS arrival, patient was noted to be hypoglycemic to 48 at which point EMS gave patient D50 with improvement.  On arrival to ED, patient again altered and minimally responsive.  Fingerstick obtained and noted to be 34.  Gave IV dextrose with immediate improvement in mental status.  Patient at baseline is very poor historian and unable to provide further history.  Chart review and paperwork from nursing home did not show that patient is on sulfonylurea and it is unclear if patient has been eating or how much medication patient has been taking in terms of his diabetes.  Patient otherwise denying active complaints.    Vital Signs: I have reviewed the initial vital signs.  Constitutional: NAD, well-nourished, appears stated age, no acute distress.  HEENT: Airway patent, moist MM, no erythema/swelling/deformity of oral structures. EOMI, PERRLA.  CV: regular rate, regular rhythm, well-perfused extremities, 2+ b/l DP and radial pulses equal.  Lungs: BCTA, no increased WOB.  ABD: NTND, no guarding or rebound, no pulsatile mass, no hernias.   MSK: Neck supple, nontender, nl ROM, no stepoff. Chest nontender. Back nontender in TLS spine or to b/l bony structures or flanks. Ext nontender, nl rom, no deformity.   INTEG: Skin warm, dry, no rash.  NEURO: Moving all extremities, no focal deficits  PSYCH: Calm, cooperative, normal affect and interaction.    Will obtain labs, EKG, serial FS, re-eval.

## 2023-07-02 NOTE — ED PROVIDER NOTE - TEST CONSIDERED BUT NOT PERFORMED
Tests Considered But Not Performed CT head for AMS considered but not performed - patient with clear explanation of AMS which immediately resolved with D50 administration. No focal deficits to suggest intracranial abnormality.

## 2023-07-02 NOTE — ED PROVIDER NOTE - CLINICAL SUMMARY MEDICAL DECISION MAKING FREE TEXT BOX
Patient presented status post acute onset of altered mental status as documented.  Per EMS report, fingerstick in the field was 35 at which point patient was given glucose tablets with improvement.  On arrival to ED, patient AAO x1, and is unclear what patient's baseline mental status is.  Fingerstick noted to be in the 30s again in triage and therefore was given 1 amp of D50 with improvement of mental status.  Patient denies memory of medication history or any other active complaints although he is limited historian.  EKG obtained and grossly nonischemic.  Obtained labs which were grossly unremarkable including no significant leukocytosis, anemia, signs of dehydration/OLEG, transaminitis or significant electrolyte abnormalities.  Patient's fingerstick remained improved status post D50 and tolerating p.o.  However given persistent hypoglycemia, and concern for recurrence of symptoms, will admit for serial fingersticks and close monitoring.  Patient agreeable with plan.  Hemodynamically stable at time of admission.

## 2023-07-02 NOTE — ED ADULT NURSE REASSESSMENT NOTE - NEURO ASSESSMENT
Milli Wright MD  [facility] Nurs[facility] 42 minutes ago (9:39 AM)      Please notify the patient or her son, Mr. Corinna Simmons, that I have ordered physical therapy and they should be in touch with her soon. - - -

## 2023-07-02 NOTE — ED ADULT NURSE REASSESSMENT NOTE - NS ED NURSE REASSESS COMMENT FT1
Pt FS improved  to 105 s/p D50W 1 amp IVP x 1 dose. Mental status improved w/in a few minutes after glucose administered via IV. Pt given food to eat, tolerated sandwiches and oral fluids. Repeat FS s/p meal = 185. MD made aware. Pt now resting quietly, no new complaints, stated he feels a lot better and wants to go home. Fall prevention and safety measures maintained.

## 2023-07-02 NOTE — ED PROVIDER NOTE - CONSIDERATION OF ADMISSION OBSERVATION
Patient with unexplained persistent hypoglycemia despite D5 administration in the field and again in the ED. Will require admission for serial fingersticks and further monitoring/work up Consideration of Admission/Observation

## 2023-07-02 NOTE — ED ADULT TRIAGE NOTE - CHIEF COMPLAINT QUOTE
BIBEMS from [facility] for Hypoglycemia 48 on scene EMS gave d50 10 mins  later 51 FS 34 in Triage. Pt A&Ox1-2.

## 2023-07-02 NOTE — ED ADULT NURSE REASSESSMENT NOTE - NSFALLHARMRISKINTERV_ED_ALL_ED
Assistance with ambulation/Communicate risk of Fall with Harm to all staff, patient, and family/Monitor gait and stability/Provide visual cue: red socks, yellow wristband, yellow gown, etc/Reinforce activity limits and safety measures with patient and family/Bed in lowest position, wheels locked, appropriate side rails in place/Call bell, personal items and telephone in reach/Instruct patient to call for assistance before getting out of bed/chair/stretcher/Non-slip footwear applied when patient is off stretcher/[place] to call system/Physically safe environment - no spills, clutter or unnecessary equipment/Purposeful Proactive Rounding/Room/bathroom lighting operational, light cord in reach

## 2023-07-02 NOTE — ED ADULT NURSE NOTE - BIRTH SEX
Arrived to the formerly [facility]. Zoladex completed.    Provided education on Zoladex-patient has received this medication before   Patient instructed to report any side affects to ordering provider-  Patient tolerated well  Any issues or concerns during appointment: No  Patient aware of next infusion appointment on [address] [facility] 2nd @ 1500  Discharged home ambulatory Male

## 2023-07-03 LAB
A1C WITH ESTIMATED AVERAGE GLUCOSE RESULT: 8.1 % — HIGH (ref 4–5.6)
ALBUMIN SERPL ELPH-MCNC: 4.7 G/DL — SIGNIFICANT CHANGE UP (ref 3.5–5.2)
ALP SERPL-CCNC: 97 U/L — SIGNIFICANT CHANGE UP (ref 30–115)
ALT FLD-CCNC: 22 U/L — SIGNIFICANT CHANGE UP (ref 0–41)
ANION GAP SERPL CALC-SCNC: 11 MMOL/L — SIGNIFICANT CHANGE UP (ref 7–14)
ANION GAP SERPL CALC-SCNC: 13 MMOL/L — SIGNIFICANT CHANGE UP (ref 7–14)
AST SERPL-CCNC: 24 U/L — SIGNIFICANT CHANGE UP (ref 0–41)
BASOPHILS # BLD AUTO: 0.06 K/UL — SIGNIFICANT CHANGE UP (ref 0–0.2)
BASOPHILS NFR BLD AUTO: 0.6 % — SIGNIFICANT CHANGE UP (ref 0–1)
BILIRUB SERPL-MCNC: 0.4 MG/DL — SIGNIFICANT CHANGE UP (ref 0.2–1.2)
BUN SERPL-MCNC: 10 MG/DL — SIGNIFICANT CHANGE UP (ref 10–20)
BUN SERPL-MCNC: 10 MG/DL — SIGNIFICANT CHANGE UP (ref 10–20)
CALCIUM SERPL-MCNC: 9.1 MG/DL — SIGNIFICANT CHANGE UP (ref 8.4–10.5)
CALCIUM SERPL-MCNC: 9.9 MG/DL — SIGNIFICANT CHANGE UP (ref 8.4–10.5)
CHLORIDE SERPL-SCNC: 90 MMOL/L — LOW (ref 98–110)
CHLORIDE SERPL-SCNC: 96 MMOL/L — LOW (ref 98–110)
CHOLEST SERPL-MCNC: 134 MG/DL — SIGNIFICANT CHANGE UP
CO2 SERPL-SCNC: 23 MMOL/L — SIGNIFICANT CHANGE UP (ref 17–32)
CO2 SERPL-SCNC: 28 MMOL/L — SIGNIFICANT CHANGE UP (ref 17–32)
CREAT SERPL-MCNC: 0.5 MG/DL — LOW (ref 0.7–1.5)
CREAT SERPL-MCNC: 0.5 MG/DL — LOW (ref 0.7–1.5)
EGFR: 110 ML/MIN/1.73M2 — SIGNIFICANT CHANGE UP
EGFR: 110 ML/MIN/1.73M2 — SIGNIFICANT CHANGE UP
EOSINOPHIL # BLD AUTO: 0.05 K/UL — SIGNIFICANT CHANGE UP (ref 0–0.7)
EOSINOPHIL NFR BLD AUTO: 0.5 % — SIGNIFICANT CHANGE UP (ref 0–8)
ESTIMATED AVERAGE GLUCOSE: 186 MG/DL — HIGH (ref 68–114)
GLUCOSE BLDC GLUCOMTR-MCNC: 100 MG/DL — HIGH (ref 70–99)
GLUCOSE BLDC GLUCOMTR-MCNC: 110 MG/DL — HIGH (ref 70–99)
GLUCOSE BLDC GLUCOMTR-MCNC: 117 MG/DL — HIGH (ref 70–99)
GLUCOSE BLDC GLUCOMTR-MCNC: 139 MG/DL — HIGH (ref 70–99)
GLUCOSE BLDC GLUCOMTR-MCNC: 152 MG/DL — HIGH (ref 70–99)
GLUCOSE BLDC GLUCOMTR-MCNC: 229 MG/DL — HIGH (ref 70–99)
GLUCOSE SERPL-MCNC: 161 MG/DL — HIGH (ref 70–99)
GLUCOSE SERPL-MCNC: 178 MG/DL — HIGH (ref 70–99)
HCT VFR BLD CALC: 43.9 % — SIGNIFICANT CHANGE UP (ref 42–52)
HDLC SERPL-MCNC: 57 MG/DL — SIGNIFICANT CHANGE UP
HGB BLD-MCNC: 14.8 G/DL — SIGNIFICANT CHANGE UP (ref 14–18)
IMM GRANULOCYTES NFR BLD AUTO: 0.5 % — HIGH (ref 0.1–0.3)
LIPID PNL WITH DIRECT LDL SERPL: 67 MG/DL — SIGNIFICANT CHANGE UP
LYMPHOCYTES # BLD AUTO: 0.91 K/UL — LOW (ref 1.2–3.4)
LYMPHOCYTES # BLD AUTO: 9.5 % — LOW (ref 20.5–51.1)
MAGNESIUM SERPL-MCNC: 1.9 MG/DL — SIGNIFICANT CHANGE UP (ref 1.8–2.4)
MCHC RBC-ENTMCNC: 29.8 PG — SIGNIFICANT CHANGE UP (ref 27–31)
MCHC RBC-ENTMCNC: 33.7 G/DL — SIGNIFICANT CHANGE UP (ref 32–37)
MCV RBC AUTO: 88.5 FL — SIGNIFICANT CHANGE UP (ref 80–94)
MONOCYTES # BLD AUTO: 0.77 K/UL — HIGH (ref 0.1–0.6)
MONOCYTES NFR BLD AUTO: 8 % — SIGNIFICANT CHANGE UP (ref 1.7–9.3)
NEUTROPHILS # BLD AUTO: 7.74 K/UL — HIGH (ref 1.4–6.5)
NEUTROPHILS NFR BLD AUTO: 80.9 % — HIGH (ref 42.2–75.2)
NON HDL CHOLESTEROL: 77 MG/DL — SIGNIFICANT CHANGE UP
NRBC # BLD: 0 /100 WBCS — SIGNIFICANT CHANGE UP (ref 0–0)
PHOSPHATE SERPL-MCNC: 3.5 MG/DL — SIGNIFICANT CHANGE UP (ref 2.1–4.9)
PLATELET # BLD AUTO: 301 K/UL — SIGNIFICANT CHANGE UP (ref 130–400)
PMV BLD: 10.4 FL — SIGNIFICANT CHANGE UP (ref 7.4–10.4)
POTASSIUM SERPL-MCNC: 4.6 MMOL/L — SIGNIFICANT CHANGE UP (ref 3.5–5)
POTASSIUM SERPL-MCNC: 4.6 MMOL/L — SIGNIFICANT CHANGE UP (ref 3.5–5)
POTASSIUM SERPL-SCNC: 4.6 MMOL/L — SIGNIFICANT CHANGE UP (ref 3.5–5)
POTASSIUM SERPL-SCNC: 4.6 MMOL/L — SIGNIFICANT CHANGE UP (ref 3.5–5)
PROT SERPL-MCNC: 6.9 G/DL — SIGNIFICANT CHANGE UP (ref 6–8)
RBC # BLD: 4.96 M/UL — SIGNIFICANT CHANGE UP (ref 4.7–6.1)
RBC # FLD: 12.2 % — SIGNIFICANT CHANGE UP (ref 11.5–14.5)
SODIUM SERPL-SCNC: 129 MMOL/L — LOW (ref 135–146)
SODIUM SERPL-SCNC: 132 MMOL/L — LOW (ref 135–146)
TRIGL SERPL-MCNC: 51 MG/DL — SIGNIFICANT CHANGE UP
TROPONIN T SERPL-MCNC: <0.01 NG/ML — SIGNIFICANT CHANGE UP
TSH SERPL-MCNC: 0.79 UIU/ML — SIGNIFICANT CHANGE UP (ref 0.27–4.2)
WBC # BLD: 9.58 K/UL — SIGNIFICANT CHANGE UP (ref 4.8–10.8)
WBC # FLD AUTO: 9.58 K/UL — SIGNIFICANT CHANGE UP (ref 4.8–10.8)

## 2023-07-03 PROCEDURE — 99222 1ST HOSP IP/OBS MODERATE 55: CPT

## 2023-07-03 PROCEDURE — 93010 ELECTROCARDIOGRAM REPORT: CPT

## 2023-07-03 PROCEDURE — 71045 X-RAY EXAM CHEST 1 VIEW: CPT | Mod: 26

## 2023-07-03 PROCEDURE — 99223 1ST HOSP IP/OBS HIGH 75: CPT

## 2023-07-03 PROCEDURE — 70496 CT ANGIOGRAPHY HEAD: CPT | Mod: 26

## 2023-07-03 PROCEDURE — 0042T: CPT

## 2023-07-03 PROCEDURE — 70498 CT ANGIOGRAPHY NECK: CPT | Mod: 26

## 2023-07-03 PROCEDURE — 70450 CT HEAD/BRAIN W/O DYE: CPT | Mod: 26,59

## 2023-07-03 RX ORDER — LISINOPRIL 2.5 MG/1
40 TABLET ORAL DAILY
Refills: 0 | Status: DISCONTINUED | OUTPATIENT
Start: 2023-07-03 | End: 2023-07-06

## 2023-07-03 RX ORDER — HALOPERIDOL DECANOATE 100 MG/ML
1 INJECTION INTRAMUSCULAR DAILY
Refills: 0 | Status: DISCONTINUED | OUTPATIENT
Start: 2023-07-03 | End: 2023-07-06

## 2023-07-03 RX ORDER — GLUCAGON INJECTION, SOLUTION 0.5 MG/.1ML
1 INJECTION, SOLUTION SUBCUTANEOUS ONCE
Refills: 0 | Status: DISCONTINUED | OUTPATIENT
Start: 2023-07-03 | End: 2023-07-06

## 2023-07-03 RX ORDER — SODIUM CHLORIDE 9 MG/ML
1000 INJECTION, SOLUTION INTRAVENOUS
Refills: 0 | Status: DISCONTINUED | OUTPATIENT
Start: 2023-07-03 | End: 2023-07-06

## 2023-07-03 RX ORDER — DEXTROSE 50 % IN WATER 50 %
25 SYRINGE (ML) INTRAVENOUS ONCE
Refills: 0 | Status: DISCONTINUED | OUTPATIENT
Start: 2023-07-03 | End: 2023-07-06

## 2023-07-03 RX ORDER — ACETAMINOPHEN 500 MG
650 TABLET ORAL EVERY 6 HOURS
Refills: 0 | Status: DISCONTINUED | OUTPATIENT
Start: 2023-07-03 | End: 2023-07-06

## 2023-07-03 RX ORDER — SODIUM CHLORIDE 9 MG/ML
1000 INJECTION INTRAMUSCULAR; INTRAVENOUS; SUBCUTANEOUS
Refills: 0 | Status: DISCONTINUED | OUTPATIENT
Start: 2023-07-03 | End: 2023-07-06

## 2023-07-03 RX ORDER — LANOLIN ALCOHOL/MO/W.PET/CERES
5 CREAM (GRAM) TOPICAL AT BEDTIME
Refills: 0 | Status: DISCONTINUED | OUTPATIENT
Start: 2023-07-03 | End: 2023-07-06

## 2023-07-03 RX ORDER — INSULIN LISPRO 100/ML
VIAL (ML) SUBCUTANEOUS
Refills: 0 | Status: DISCONTINUED | OUTPATIENT
Start: 2023-07-03 | End: 2023-07-06

## 2023-07-03 RX ORDER — THIAMINE MONONITRATE (VIT B1) 100 MG
500 TABLET ORAL ONCE
Refills: 0 | Status: COMPLETED | OUTPATIENT
Start: 2023-07-03 | End: 2023-07-03

## 2023-07-03 RX ORDER — DEXTROSE 50 % IN WATER 50 %
15 SYRINGE (ML) INTRAVENOUS ONCE
Refills: 0 | Status: DISCONTINUED | OUTPATIENT
Start: 2023-07-03 | End: 2023-07-06

## 2023-07-03 RX ORDER — ATORVASTATIN CALCIUM 80 MG/1
10 TABLET, FILM COATED ORAL AT BEDTIME
Refills: 0 | Status: DISCONTINUED | OUTPATIENT
Start: 2023-07-03 | End: 2023-07-06

## 2023-07-03 RX ORDER — ASPIRIN/CALCIUM CARB/MAGNESIUM 324 MG
81 TABLET ORAL DAILY
Refills: 0 | Status: DISCONTINUED | OUTPATIENT
Start: 2023-07-03 | End: 2023-07-06

## 2023-07-03 RX ORDER — DEXTROSE 50 % IN WATER 50 %
12.5 SYRINGE (ML) INTRAVENOUS ONCE
Refills: 0 | Status: DISCONTINUED | OUTPATIENT
Start: 2023-07-03 | End: 2023-07-06

## 2023-07-03 RX ADMIN — ATORVASTATIN CALCIUM 10 MILLIGRAM(S): 80 TABLET, FILM COATED ORAL at 21:09

## 2023-07-03 RX ADMIN — Medication 2: at 18:23

## 2023-07-03 RX ADMIN — Medication 105 MILLIGRAM(S): at 10:50

## 2023-07-03 RX ADMIN — HALOPERIDOL DECANOATE 1 MILLIGRAM(S): 100 INJECTION INTRAMUSCULAR at 12:02

## 2023-07-03 RX ADMIN — LISINOPRIL 40 MILLIGRAM(S): 2.5 TABLET ORAL at 06:42

## 2023-07-03 RX ADMIN — SODIUM CHLORIDE 75 MILLILITER(S): 9 INJECTION INTRAMUSCULAR; INTRAVENOUS; SUBCUTANEOUS at 15:19

## 2023-07-03 RX ADMIN — Medication 1: at 12:03

## 2023-07-03 NOTE — H&P ADULT - ASSESSMENT
a 67 year old male with a PMH of DM and HTN presents for Altered mental status of 1 day duration. hisotry goes back to 1 week ago were the patient was note to be altered and hypoglycemia in the [facility] home. As per the nursing home the patient is usually not oriented, but awake and is able to walk. Today the patient was note to have a seizure like episode and BP was noted to be 200/ 85, Fingerstick was normal 100. the seizure episode stopped. The BP was back to 155 / 75 The patient was not responsive during the episode.The patient regained conciousness partially but looks confused. Patient was sent for a CT scan of the head and the neurology resident was called. A CT scan prelim red was negative for a bleed or a large stroke Patient was placed on  a Nasal canula. Limited history could be taken from the Nursing home. Labs were noticeable for a FS of 35 given dextrose push   VBG: pH 7.34, pCO2 59  VS noticeable for a BP of 200/85 during the stroke code and the seizure episode.     I tried to call to call the family with the phone number in the chart with no reply     # AMS:  - witnessed a seizure like episode  - previous episode earlier this weak of AMS at [facility] when blood sugar was low  - Code stroke called  - Prelim read of CT scan showed no bleed or acute stroke   - FU Official read of CT scan   - Monitors FS  - rEEG   - FU neurolgy (following since patient was a stroke code)   - FU CBC, CMP TSH, A1c, lipid panel, prolactin     # HTN:  - BP was elevated 200/85 decreased on its own   - cw home medications    # DM:  - POCT 140-180   - Avoid hypoglycemia   - ISS  - on home Lantus 40      # DVTproph: hold off for now until official read of CT scan is out   # GI proph: protonix   # Dispo: inpatient floors   # Diet: NPO until the patient is evaluated by speech and swallow   a 67 year old male with a PMH of DM and HTN presents for Altered mental status of 1 day duration. hisotry goes back to 1 week ago were the patient was note to be altered and hypoglycemia in the [facility] nursing home. As per the nursing home the patient is usually not oriented, but awake and is able to walk. Today the patient was note to have a seizure like episode, he became unresponsive after using the bathroom, L pupil > R pupil, /89. Initially patient not responding to voice or painful stimuli. Repeat BP was systolic 140. Code Stroke was also called. After approximately 10 minutes, patient started to follow command and respond to voice. Patient was sent for a CT scan of the head. A CT scan prelim red was negative for a bleed or a large stroke. Patient was placed on  a Nasal canula. Limited history could be taken from the Nursing home. Labs were noticeable for a FS of 35 given dextrose push in ED. I called the [facility] and all they could say is that the patient was sent for AMS     I tried to call to call the family with the phone number in the chart with no reply     # AMS:  - witnessed a seizure like episode  - previous episode earlier this weak of AMS at [facility] when blood sugar was low  - Code stroke called  - Prelim read of CT scan showed no bleed or acute stroke   - FU Official read of CT scan   - Monitors FS  - rEEG   - FU neurolgy (following since patient was a stroke code)   - FU CBC, CMP TSH, A1c, lipid panel, prolactin     # HTN:  - BP was elevated 200/85 decreased on its own   - cw home medications    # DM:  - POCT 140-180   - Avoid hypoglycemia   - ISS  - on home Lantus 40      # DVTproph: hold off for now until official read of CT scan is out   # GI proph: protonix   # Dispo: inpatient floors   # Diet: NPO until the patient is evaluated by speech and swallow

## 2023-07-03 NOTE — H&P ADULT - HISTORY OF PRESENT ILLNESS
a 67 year old male with a PMH of DM and HTN presents for Altered mental status of 1 day duration. hisotry goes back to 1 week ago were the patient was note to be altered and hypoglycemia in the [facility]. As per the nursing home the patient is usually not oriented, but awake and is able to walk. Today the patient was note to have a seizure like episode and BP was noted to be 200/ 85, Fingerstick was normal 100. the seizure episode stopped. The BP was back to 155 / 75 The patient was not responsive during the episode.The patient regained conciousness partially but looks confused. Patient was sent for a CT scan of the head and the neurology resident was called. A CT scan prelim red was negative for a bleed or a large stroke Patient was placed on  a Nasal canula. Limited history could be taken from the Nursing home. Labs were noticeable for a FS of 35 given dextrose push   VBG: pH 7.34, pCO2 59  VS noticeable for a BP of 200/85 during the stroke code and the seizure episode.     Vital Signs Last 24 Hrs  T(C): 36.4 (03 Jul 2023 01:07), Max: 36.4 (03 Jul 2023 01:07)  T(F): 97.6 (03 Jul 2023 01:07), Max: 97.6 (03 Jul 2023 01:07)  HR: 99 (03 Jul 2023 01:07) (88 - 99)  BP: 159/67 (03 Jul 2023 01:07) (135/65 - 166/87)  BP(mean): 96 (03 Jul 2023 01:07) (96 - 96)  RR: 18 (03 Jul 2023 01:07) (16 - 19)  SpO2: 99% (03 Jul 2023 01:07) (96% - 99%)  Patient On (Oxygen Delivery Method): room air          a 67 year old male with a PMH of DM and HTN presents for Altered mental status of 1 day duration. hisotry goes back to 1 week ago were the patient was note to be altered and hypoglycemia in the [facility] nursing [place]. As per the nursing home the patient is usually not oriented, but awake and is able to walk. Today the patient was note to have a seizure like episode, he became unresponsive after using the bathroom, L pupil > R pupil, /89. Initially patient not responding to voice or painful stimuli. Repeat BP was systolic 140. Code Stroke was also called. After approximately 10 minutes, patient started to follow command and respond to voice. Patient was sent for a CT scan of the head and the neurology resident was called. A CT scan prelim red was negative for a bleed or a large stroke Patient was placed on  a Nasal canula. Limited history could be taken from the Nursing home. Labs were noticeable for a FS of 35 given dextrose push in ED.  I called the [facility] and all they could say is that the patient was sent for AMS   VBG: pH 7.34, pCO2 59  VS noticeable for a BP of 200/85 during the stroke code and the seizure episode.     Vital Signs Last 24 Hrs  T(C): 36.4 (03 Jul 2023 01:07), Max: 36.4 (03 Jul 2023 01:07)  T(F): 97.6 (03 Jul 2023 01:07), Max: 97.6 (03 Jul 2023 01:07)  HR: 99 (03 Jul 2023 01:07) (88 - 99)  BP: 159/67 (03 Jul 2023 01:07) (135/65 - 166/87)  BP(mean): 96 (03 Jul 2023 01:07) (96 - 96)  RR: 18 (03 Jul 2023 01:07) (16 - 19)  SpO2: 99% (03 Jul 2023 01:07) (96% - 99%)  Patient On (Oxygen Delivery Method): room air          a 67 year old male with a PMH of DM and HTN presents for Altered mental status of 1 day duration. hisotry goes back to 1 week ago were the patient was note to be altered and hypoglycemia in the [facility] nursing [place]. As per the nursing home the patient is usually not oriented, but awake and is able to walk. Today the patient was note to have a seizure like episode, he became unresponsive after using the bathroom, L pupil > R pupil, /89. Initially patient not responding to voice or painful stimuli. Repeat BP was systolic 140. Code Stroke was also called. After approximately 10 minutes, patient started to follow command and respond to voice. Patient was sent for a CT scan of the head and the neurology resident was called. A CT scan prelim red was negative for a bleed or a large stroke Patient was placed on  a Nasal canula. Limited history could be taken from the Nursing home. Labs were noticeable for a FS of 35 given dextrose push in ED.  I called the [facility] and all they could say is that the patient was sent for AMS   VBG: pH 7.34, pCO2 59  VS noticeable for a BP of 200/89 during the stroke code and the seizure episode.     Vital Signs Last 24 Hrs  T(C): 36.4 (03 Jul 2023 01:07), Max: 36.4 (03 Jul 2023 01:07)  T(F): 97.6 (03 Jul 2023 01:07), Max: 97.6 (03 Jul 2023 01:07)  HR: 99 (03 Jul 2023 01:07) (88 - 99)  BP: 159/67 (03 Jul 2023 01:07) (135/65 - 166/87)  BP(mean): 96 (03 Jul 2023 01:07) (96 - 96)  RR: 18 (03 Jul 2023 01:07) (16 - 19)  SpO2: 99% (03 Jul 2023 01:07) (96% - 99%)  Patient On (Oxygen Delivery Method): room air

## 2023-07-03 NOTE — H&P ADULT - NSHPPHYSICALEXAM_GEN_ALL_CORE
General: Awake, not oriented   Lungs: GBAE, no crackles   Heart: RRR, no murmur   Abdomen: Soft, non-tender, non-distended + BS   Extremities: + PP . no edema

## 2023-07-03 NOTE — CONSULT NOTE ADULT - ASSESSMENT
This is a 66yo M admitted for hypoglycemia s/p dextrose was a stroke code for unresponsiveness. NIHSS 23, LKN 0450. NCCTH w/o hemorrhage or hyperacute ischemic sign. CTA w/o LVO however, extensive ECAD disease. Post CTH reassessment NIHSS 3 for incorrect ans and LUE drift. Although within window no TNK after rapid improvement. Cachectic on PE, hypoglycemic and memory impairment is suspicious for malnourished and vitamin insufficiency.     Impression   Unresponsiveness   seizure like event w/ RENEE       Recommend  rEEG  repeat routine labs   start Thiamine therapy   start Aspirin 81mg ECAD    Call Neurovascular Team [id number] for any neurological changes or concern     Case D/w Dr. Burch This is a 68yo M admitted for hypoglycemia s/p dextrose was a stroke code for unresponsiveness. NIHSS 23, LKN 0450. NCCTH w/o hemorrhage or hyperacute ischemic sign. CTA w/o LVO however, extensive ECAD disease. Post CTH reassessment NIHSS 3 for incorrect ans and LUE drift. Although within window no TNK after rapid improvement. Cachectic on PE, hypoglycemic and memory impairment is suspicious for malnourished and vitamin insufficiency.     Impression   Unresponsiveness   seizure like event w/ RENEE       Recommend  rEEG  repeat routine labs   start Thiamine therapy   start Aspirin 81mg ECAD    Case D/w Dr. Burch

## 2023-07-03 NOTE — H&P ADULT - NSHPLABSRESULTS_GEN_ALL_CORE
LABS:                        13.8   8.94  )-----------( 312      ( 02 Jul 2023 21:55 )             40.9     07-02    136  |  95<L>  |  13  ----------------------------<  35<LL>  4.0   |  29  |  0.7    Ca    10.0      02 Jul 2023 21:55  Mg     1.9     07-02    TPro  7.0  /  Alb  4.6  /  TBili  0.2  /  DBili  x   /  AST  18  /  ALT  20  /  AlkPhos  83  07-02        CTH: no large bleed or acute stroke (prelim read) LABS:                        13.8   8.94  )-----------( 312      ( 02 Jul 2023 21:55 )             40.9     07-02    136  |  95<L>  |  13  ----------------------------<  35<LL>  4.0   |  29  |  0.7    Ca    10.0      02 Jul 2023 21:55  Mg     1.9     07-02    TPro  7.0  /  Alb  4.6  /  TBili  0.2  /  DBili  x   /  AST  18  /  ALT  20  /  AlkPhos  83  07-02      CTH: no large bleed or acute stroke (prelim read)

## 2023-07-03 NOTE — PATIENT PROFILE ADULT - FUNCTIONAL ASSESSMENT - BASIC MOBILITY 6.
3-calculated by average/Not able to assess (calculate score using [facility] averaging method)

## 2023-07-03 NOTE — CONSULT NOTE ADULT - SUBJECTIVE AND OBJECTIVE BOX
Neurology Consult    Patient is a 67y old  Male who presents with a chief complaint of hypoglycemia was a stroke code for unresponsiveness    HPI:   Mr. Lake is 67 year old male with a PMH of DM and HTN presents for Altered mental status of 1 day duration and admitted for hypoglycemia. He was a Rapid Response and  stroke code due to unresponsiveness and unable to provide hx. RR team proved hx. Team reported that Pt was returning to bed from the bathroom assisted by Aid when he suddenly started shaking. He was assisted to the bed when he became unresponsive to verbal stimulation. Bedside . Pt NIHSS was 23 stroke team assessment however improved to 3 for incorrect ans and RUE drift.     He was unsure if he ever had a seizure or stroke in the past. Currently he denies HA, palpitations, loss of sensation or weakness.      Vital Signs Last 24 Hrs  T(C): 36.4 (03 Jul 2023 01:07), Max: 36.4 (03 Jul 2023 01:07)  T(F): 97.6 (03 Jul 2023 01:07), Max: 97.6 (03 Jul 2023 01:07)  HR: 99 (03 Jul 2023 01:07) (88 - 99)  BP: 159/67 (03 Jul 2023 01:07) (135/65 - 166/87)  BP(mean): 96 (03 Jul 2023 01:07) (96 - 96)  RR: 18 (03 Jul 2023 01:07) (16 - 19)  SpO2: 99% (03 Jul 2023 01:07) (96% - 99%)  Patient On (Oxygen Delivery Method): room air      PAST MEDICAL & SURGICAL HISTORY:  Diabetes  Schizo-affective schizophrenia  HTN    FAMILY HISTORY:      Social History: Resident of [facility]     Allergies: No Known Allergies      MEDICATIONS  (STANDING):  atorvastatin 10 milliGRAM(s) Oral at bedtime  dextrose 5%. 1000 milliLiter(s) (100 mL/Hr) IV Continuous <Continuous>  dextrose 5%. 1000 milliLiter(s) (50 mL/Hr) IV Continuous <Continuous>  dextrose 50% Injectable 12.5 Gram(s) IV Push once  dextrose 50% Injectable 25 Gram(s) IV Push once  dextrose 50% Injectable 25 Gram(s) IV Push once  glucagon  Injectable 1 milliGRAM(s) IntraMuscular once  haloperidol     Tablet 1 milliGRAM(s) Oral daily  insulin lispro (ADMELOG) corrective regimen sliding scale   SubCutaneous three times a day before meals  lisinopril 40 milliGRAM(s) Oral daily    MEDICATIONS  (PRN):  acetaminophen     Tablet .. 650 milliGRAM(s) Oral every 6 hours PRN Temp greater or equal to 38C (100.4F), Mild Pain (1 - 3)  dextrose Oral Gel 15 Gram(s) Oral once PRN Blood Glucose LESS THAN 70 milliGRAM(s)/deciliter  melatonin 5 milliGRAM(s) Oral at bedtime PRN Insomnia      Review of systems:  as per HPI    Vital Signs Last 24 Hrs  T(C): 36.4 (03 Jul 2023 01:07), Max: 36.4 (03 Jul 2023 01:07)  T(F): 97.6 (03 Jul 2023 01:07), Max: 97.6 (03 Jul 2023 01:07)  HR: 99 (03 Jul 2023 01:07) (88 - 99)  BP: 159/67 (03 Jul 2023 01:07) (135/65 - 166/87)  BP(mean): 96 (03 Jul 2023 01:07) (96 - 96)  RR: 18 (03 Jul 2023 01:07) (16 - 19)  SpO2: 99% (03 Jul 2023 01:07) (96% - 99%): ORA    Examination:  General:  Appearance is older than stated age and cachectic   Cognitive/Language:  The patient is oriented to person, know he is in the hospital (not name), season but not the date, stated CAMEJO is the current president. Impaired Recent and remote memory. Language with normal repetition, comprehension and naming.  Nondysarthric.  Able to follow simple and complex commands   Eyes: VFF.  EOMI w/o nystagmus, skew or reported double vision.  OS 4mm, OD 1mm (Anisocoria), PNERRL.  No ptosis/weakness of eyelid closure.    Face:  Facial sensation normal V1 - 3, no facial asymmetry.    Ears/Nose/Throat:  Hearing grossly intact b/l.  Palate elevates midline.  Tongue and uvula midline.   Motor examination:   Normal tone, bulk and range of motion.  No tenderness, twitching, tremors or involuntary movements.  Formal Muscle Strength Testing: (MRC grade R/L) 5/5 UE; 5/5 LE.  LUE pronator drift   Reflexes:   2+ b/l pectoralis, biceps, triceps, brachioradialis, patella and Achilles.  Plantar response downgoing b/l. clonus absent.  Sensory examination:   Intact to light touch and pinprick, pain, temperature and proprioception and vibration in all extremities.  Cerebellum:   No dysmetria on FTN/HKS .     Gait deferred     Mouth: Absent dentures   Respiratory:  no audible wheezing or inspiratory stridor.  no use of accessory muscles.   Cardiac: pulse palpable, no audible bruits  Abdomen: supple, no guarding, no TTP    Labs:   CBC Full  -  ( 02 Jul 2023 21:55 )  WBC Count : 8.94 K/uL  RBC Count : 4.60 M/uL  Hemoglobin : 13.8 g/dL  Hematocrit : 40.9 %  Platelet Count - Automated : 312 K/uL  Mean Cell Volume : 88.9 fL  Mean Cell Hemoglobin : 30.0 pg  Mean Cell Hemoglobin Concentration : 33.7 g/dL  Auto Neutrophil # : 7.16 K/uL  Auto Lymphocyte # : 0.88 K/uL  Auto Monocyte # : 0.74 K/uL  Auto Eosinophil # : 0.04 K/uL  Auto Basophil # : 0.06 K/uL  Auto Neutrophil % : 80.1 %  Auto Lymphocyte % : 9.8 %  Auto Monocyte % : 8.3 %  Auto Eosinophil % : 0.4 %  Auto Basophil % : 0.7 %    07-02    136  |  95<L>  |  13  ----------------------------<  35<LL>  4.0   |  29  |  0.7    Ca    10.0      02 Jul 2023 21:55  Mg     1.9     07-02    TPro  7.0  /  Alb  4.6  /  TBili  0.2  /  DBili  x   /  AST  18  /  ALT  20  /  AlkPhos  83  07-02    LIVER FUNCTIONS - ( 02 Jul 2023 21:55 )  Alb: 4.6 g/dL / Pro: 7.0 g/dL / ALK PHOS: 83 U/L / ALT: 20 U/L / AST: 18 U/L / GGT: x               Neuroimaging:  NCHCT: < from: CT Brain Stroke Protocol (07.03.23 @ 05:40) >No acute intracranial pathology.     CT Angio Brain Stroke Protocol  w/ IV Cont (07.03.23 @ 05:41) >  CTA HEAD/NECK:    AORTIC ARCH: There are moderate calcific atherosclerotic plaques without   flow-limiting stenosis.    RIGHT ANTERIOR CIRCULATION:  Calcified atherosclerotic plaque at the origin of the common carotid   artery results in mild stenosis. There is calcified atherosclerotic   plaque at the carotid bulb with extension into the proximal ECA with   resultant mild stenosis. The remaining ECA and its proximal branches are   patent without stenosis. The cervical internal carotid artery (ICA) is   patent without stenosis. Calcified atherosclerotic plaque result in mild   stenosis of the petrous, cavernous, and supraclinoid segments of the ICA.    The anterior and middle cerebral arteries (QUOC/MCA) are patent without   stenosis.      LEFT ANTERIOR CIRCULATION:  Calcified atherosclerotic plaque of the common carotid artery results in   mild stenosis. The carotid bulb is with atherosclerotic calcifications   which extends to the proximal cervical ICA andECA with resultant mild   stenosis. The remaining ECA and its proximal branches are patent without   stenosis. The remaining cervical ICA is patent without stenosis. The   proximal petrous segment of the ICA and carotid siphon are with   atherosclerotic calcifications with resultant mild stenosis.    The anterior and middle cerebral arteries are patent without stenosis.

## 2023-07-03 NOTE — H&P ADULT - NSHPADDITIONALINFOADULT_GEN_ALL_CORE
< from: CT Angio Brain Stroke Protocol  w/ IV Cont (07.03.23 @ 05:41) >    CT PERFUSION:  No perfusion deficits to suggest areas of completed infarction or at risk   territory.    CTA HEAD/NECK:  No large vessel occlusion, aneurysm, or vascular malformation.    Segments of mild stenosis above.    4 mm left upper lobe pulmonary nodule, consider follow-up chest CT in 12   months if the patient is considered high risk.    < end of copied text >

## 2023-07-03 NOTE — RAPID RESPONSE TEAM SUMMARY - NSSITUATIONBACKGROUNDRRT_GEN_ALL_CORE
Patient had a seizure, witnessed by the nurse. He became unresponsive after using the bathroom, L pupil > R pupil, /89. Initially patient not responding to voice or painful stimuli. Repeat BP was systolic 140. Code Stroke was also called. After approximately 10 minutes, patient started to follow command and respond to voice. Will follow up with CT head non con and CT angio

## 2023-07-03 NOTE — H&P ADULT - ATTENDING COMMENTS
a/p     a 67 year old male with a PMH of DM and HTN presents for Altered mental status of 1 day duration. history goes back to 1 week ago were the patient was note to be altered and hypoglycemia in the [facility] home. As per the nursing home the patient is usually not oriented, but awake and is able to walk. on the day of admission the patient was note to have a seizure like episode, he became unresponsive after using the bathroom, L pupil > R pupil, /89. stroke code was called in ER    He was assisted to the bed when he became unresponsive to verbal stimulation. Bedside . Pt NIHSS was 23 stroke team assessment however improved to 3 for incorrect ans and RUE drift. seizure like event w/ RENEE   neuro notes appreciated:   rEEG  repeat routine labs   start Thiamine therapy   start Aspirin 81mg ECAD    This am again pt had an episode of confusion and suspected seizure activity.     # AMS, possible seizure, possible due to hypoglycemia, pt diabetic on insulin ( not Lantus on hold)   check eEG,   cont thiamin and asa  monitor fs, avoid hypoglycemia     POCT Blood Glucose.: 117 mg/dL (07-03-23 @ 07:57)  POCT Blood Glucose.: 100 mg/dL (07-03-23 @ 04:56)  POCT Blood Glucose.: 110 mg/dL (07-03-23 @ 02:40)  POCT Blood Glucose.: 185 mg/dL (07-02-23 @ 22:59)  POCT Blood Glucose.: 103 mg/dL (07-02-23 @ 21:48)  POCT Blood Glucose.: 32 mg/dL (07-02-23 @ 21:42)  POCT Blood Glucose.: 34 mg/dL (07-02-23 @ 21:31)    # hypertensive urgency : now controlled,   BP: 159/67 (07-03-23 @ 01:07) (135/65 - 166/87)    # unknown hx of etoh, pt states he does not drink at all     #Progress Note Handoff  Pending: eeg  Family discussion: resident dw family   Disposition: Adult home  time spent : 50 min       .

## 2023-07-03 NOTE — SWALLOW BEDSIDE ASSESSMENT ADULT - SLP PERTINENT HISTORY OF CURRENT PROBLEM
Referral [facility]   Pt admitted w/ AMS, hypoglycemia +s/p stroke code CTH (-) seizure like event w/ RENEE

## 2023-07-03 NOTE — SWALLOW BEDSIDE ASSESSMENT ADULT - NS SPL SWALLOW CLINIC TRIAL FT
+toleration of thin, puree and minced and moist solids w/o overt s/s of penetration/aspiration. +mild oral dysphagia for soft solids

## 2023-07-04 LAB
A1C WITH ESTIMATED AVERAGE GLUCOSE RESULT: 8.1 % — HIGH (ref 4–5.6)
ALBUMIN SERPL ELPH-MCNC: 4.2 G/DL — SIGNIFICANT CHANGE UP (ref 3.5–5.2)
ALP SERPL-CCNC: 91 U/L — SIGNIFICANT CHANGE UP (ref 30–115)
ALT FLD-CCNC: 19 U/L — SIGNIFICANT CHANGE UP (ref 0–41)
ANION GAP SERPL CALC-SCNC: 12 MMOL/L — SIGNIFICANT CHANGE UP (ref 7–14)
AST SERPL-CCNC: 24 U/L — SIGNIFICANT CHANGE UP (ref 0–41)
BASE EXCESS BLDA CALC-SCNC: 4.1 MMOL/L — HIGH (ref -2–3)
BASOPHILS # BLD AUTO: 0.08 K/UL — SIGNIFICANT CHANGE UP (ref 0–0.2)
BASOPHILS NFR BLD AUTO: 1.2 % — HIGH (ref 0–1)
BILIRUB SERPL-MCNC: 0.6 MG/DL — SIGNIFICANT CHANGE UP (ref 0.2–1.2)
BUN SERPL-MCNC: 9 MG/DL — LOW (ref 10–20)
CALCIUM SERPL-MCNC: 9.4 MG/DL — SIGNIFICANT CHANGE UP (ref 8.4–10.5)
CHLORIDE SERPL-SCNC: 95 MMOL/L — LOW (ref 98–110)
CO2 SERPL-SCNC: 24 MMOL/L — SIGNIFICANT CHANGE UP (ref 17–32)
CREAT SERPL-MCNC: 0.5 MG/DL — LOW (ref 0.7–1.5)
EGFR: 110 ML/MIN/1.73M2 — SIGNIFICANT CHANGE UP
EOSINOPHIL # BLD AUTO: 0.11 K/UL — SIGNIFICANT CHANGE UP (ref 0–0.7)
EOSINOPHIL NFR BLD AUTO: 1.7 % — SIGNIFICANT CHANGE UP (ref 0–8)
ESTIMATED AVERAGE GLUCOSE: 186 MG/DL — HIGH (ref 68–114)
FOLATE SERPL-MCNC: 7.2 NG/ML — SIGNIFICANT CHANGE UP
GLUCOSE BLDC GLUCOMTR-MCNC: 142 MG/DL — HIGH (ref 70–99)
GLUCOSE BLDC GLUCOMTR-MCNC: 175 MG/DL — HIGH (ref 70–99)
GLUCOSE BLDC GLUCOMTR-MCNC: 175 MG/DL — HIGH (ref 70–99)
GLUCOSE BLDC GLUCOMTR-MCNC: 176 MG/DL — HIGH (ref 70–99)
GLUCOSE SERPL-MCNC: 151 MG/DL — HIGH (ref 70–99)
HCO3 BLDA-SCNC: 28 MMOL/L — SIGNIFICANT CHANGE UP (ref 21–28)
HCT VFR BLD CALC: 43.1 % — SIGNIFICANT CHANGE UP (ref 42–52)
HCV AB S/CO SERPL IA: 0.04 COI — SIGNIFICANT CHANGE UP
HCV AB SERPL-IMP: SIGNIFICANT CHANGE UP
HGB BLD-MCNC: 14.8 G/DL — SIGNIFICANT CHANGE UP (ref 14–18)
IMM GRANULOCYTES NFR BLD AUTO: 0.5 % — HIGH (ref 0.1–0.3)
LYMPHOCYTES # BLD AUTO: 1.05 K/UL — LOW (ref 1.2–3.4)
LYMPHOCYTES # BLD AUTO: 15.9 % — LOW (ref 20.5–51.1)
MAGNESIUM SERPL-MCNC: 1.8 MG/DL — SIGNIFICANT CHANGE UP (ref 1.8–2.4)
MCHC RBC-ENTMCNC: 30.1 PG — SIGNIFICANT CHANGE UP (ref 27–31)
MCHC RBC-ENTMCNC: 34.3 G/DL — SIGNIFICANT CHANGE UP (ref 32–37)
MCV RBC AUTO: 87.8 FL — SIGNIFICANT CHANGE UP (ref 80–94)
MONOCYTES # BLD AUTO: 0.74 K/UL — HIGH (ref 0.1–0.6)
MONOCYTES NFR BLD AUTO: 11.2 % — HIGH (ref 1.7–9.3)
NEUTROPHILS # BLD AUTO: 4.58 K/UL — SIGNIFICANT CHANGE UP (ref 1.4–6.5)
NEUTROPHILS NFR BLD AUTO: 69.5 % — SIGNIFICANT CHANGE UP (ref 42.2–75.2)
NRBC # BLD: 0 /100 WBCS — SIGNIFICANT CHANGE UP (ref 0–0)
PCO2 BLDA: 41 MMHG — SIGNIFICANT CHANGE UP (ref 35–48)
PH BLDA: 7.45 — SIGNIFICANT CHANGE UP (ref 7.35–7.45)
PLATELET # BLD AUTO: 288 K/UL — SIGNIFICANT CHANGE UP (ref 130–400)
PMV BLD: 10.1 FL — SIGNIFICANT CHANGE UP (ref 7.4–10.4)
PO2 BLDA: 84 MMHG — SIGNIFICANT CHANGE UP (ref 83–108)
POTASSIUM SERPL-MCNC: 4.7 MMOL/L — SIGNIFICANT CHANGE UP (ref 3.5–5)
POTASSIUM SERPL-SCNC: 4.7 MMOL/L — SIGNIFICANT CHANGE UP (ref 3.5–5)
PROT SERPL-MCNC: 6.5 G/DL — SIGNIFICANT CHANGE UP (ref 6–8)
RBC # BLD: 4.91 M/UL — SIGNIFICANT CHANGE UP (ref 4.7–6.1)
RBC # FLD: 12.1 % — SIGNIFICANT CHANGE UP (ref 11.5–14.5)
SAO2 % BLDA: 98.5 % — HIGH (ref 94–98)
SODIUM SERPL-SCNC: 131 MMOL/L — LOW (ref 135–146)
VIT B12 SERPL-MCNC: 1038 PG/ML — SIGNIFICANT CHANGE UP (ref 232–1245)
WBC # BLD: 6.59 K/UL — SIGNIFICANT CHANGE UP (ref 4.8–10.8)
WBC # FLD AUTO: 6.59 K/UL — SIGNIFICANT CHANGE UP (ref 4.8–10.8)

## 2023-07-04 PROCEDURE — 99233 SBSQ HOSP IP/OBS HIGH 50: CPT | Mod: 25

## 2023-07-04 PROCEDURE — 95819 EEG AWAKE AND ASLEEP: CPT | Mod: 26

## 2023-07-04 PROCEDURE — 99407 BEHAV CHNG SMOKING > 10 MIN: CPT

## 2023-07-04 PROCEDURE — 71045 X-RAY EXAM CHEST 1 VIEW: CPT | Mod: 26

## 2023-07-04 RX ORDER — NICOTINE POLACRILEX 2 MG
1 GUM BUCCAL DAILY
Refills: 0 | Status: DISCONTINUED | OUTPATIENT
Start: 2023-07-04 | End: 2023-07-06

## 2023-07-04 RX ORDER — IPRATROPIUM/ALBUTEROL SULFATE 18-103MCG
3 AEROSOL WITH ADAPTER (GRAM) INHALATION EVERY 6 HOURS
Refills: 0 | Status: DISCONTINUED | OUTPATIENT
Start: 2023-07-04 | End: 2023-07-06

## 2023-07-04 RX ADMIN — HALOPERIDOL DECANOATE 1 MILLIGRAM(S): 100 INJECTION INTRAMUSCULAR at 12:36

## 2023-07-04 RX ADMIN — LISINOPRIL 40 MILLIGRAM(S): 2.5 TABLET ORAL at 05:17

## 2023-07-04 RX ADMIN — Medication 1: at 08:51

## 2023-07-04 RX ADMIN — Medication 1 PATCH: at 17:33

## 2023-07-04 RX ADMIN — Medication 3 MILLILITER(S): at 15:42

## 2023-07-04 RX ADMIN — Medication 1 PATCH: at 18:35

## 2023-07-04 RX ADMIN — Medication 1: at 17:35

## 2023-07-04 RX ADMIN — Medication 3 MILLILITER(S): at 20:01

## 2023-07-04 RX ADMIN — ATORVASTATIN CALCIUM 10 MILLIGRAM(S): 80 TABLET, FILM COATED ORAL at 21:21

## 2023-07-04 RX ADMIN — Medication 81 MILLIGRAM(S): at 12:09

## 2023-07-04 RX ADMIN — Medication 1: at 12:09

## 2023-07-04 NOTE — PROGRESS NOTE ADULT - ASSESSMENT
67y Male with a PMH of DM and HTN presents for Altered mental status of 1 day duration. history goes back to 1 week ago were the patient was note to be altered and hypoglycemia in the [facility] home. As per the nursing home the patient is usually not oriented, but awake and is able to walk. on the day of admission the patient was note to have a seizure like episode, he became unresponsive after using the bathroom, L pupil > R pupil, /89. stroke code was called in ER  He was assisted to the bed when he became unresponsive to verbal stimulation. Bedside . Pt NIHSS was 23 stroke team assessment however improved to 3 for incorrect ans and RUE drift. seizure like event w/ RENEE.  Neuro recs appreciated: rEEG, repeat routine labs, start Thiamine therapy, start Aspirin 81mg ECAD  # AMS, likely due to hypoglycemia, pt diabetic on insulin ( not Lantus on hold)  - EEG: < from: EEG (07.03.23 @ 13:00) >No electrographic seizures or significant clinical events.  - cont thiamin and asa  - monitor fs, avoid hypoglycemia   POCT Blood Glucose.: 175 mg/dL (07-04-23 @ 08:26)  POCT Blood Glucose.: 139 mg/dL (07-03-23 @ 23:00)  POCT Blood Glucose.: 229 mg/dL (07-03-23 @ 17:23)  POCT Blood Glucose.: 152 mg/dL (07-03-23 @ 11:47)    # sob, due to copd , not in exacerbation   - cont nebs   - 4 mm left upper lobe pulmonary nodule, consider follow-up chest CT in 12 months  - ABG - ( 04 Jul 2023 09:53 ) pH, Arterial: 7.45  pH, Blood: x     /  pCO2: 41    /  pO2: 84    / HCO3: 28    / Base Excess: 4.1   /  SaO2: 98.5    - cxr no cardiopulm disease  - f/u outpatient    # hypertensive urgency : now controlled,   BP: 168/76 (07-04-23 @ 05:14) (130/70 - 168/76)    # unknown hx of etoh  - pt states he does not drink at all   - cont thiamine supplement     # active tobacco smoker, 1-2 pack a day ,   - counseled,  - agrees with nicotine patch, ordered nicotine 21 mg /d     #Progress Note Handoff  Pending: clinical improvement , anticipate dc in 24 hrs  Family discussion: resident carmen pt   Disposition: Adult home  Diet: minced and moist diet w/ thin liquids  < end of copied text >

## 2023-07-04 NOTE — PROGRESS NOTE ADULT - SUBJECTIVE AND OBJECTIVE BOX
24H events:    Patient is a 67y old Male who presents with a chief complaint of Hypoglycemia (03 Jul 2023 06:11)    Primary diagnosis of Hypoglycemia    Today is hospital day 2d. This morning patient was seen and examined at bedside, resting comfortably in bed.    Gave 1L NS for moderate hyponatremia (Na 129> 132).    Code Status:    Family communication:  Contact date:  Name of person contacted:  Relationship to patient:  Communication details:  What matters most:    PAST MEDICAL & SURGICAL HISTORY  Diabetes    Schizo-affective schizophrenia    No significant past surgical history    No significant past surgical history      SOCIAL HISTORY:  Social History: admits to smoking tobacco 1-2 packs/day, denies alcohol.      ALLERGIES:  No Known Allergies    MEDICATIONS:  STANDING MEDICATIONS  albuterol/ipratropium for Nebulization 3 milliLiter(s) Nebulizer every 6 hours  aspirin enteric coated 81 milliGRAM(s) Oral daily  atorvastatin 10 milliGRAM(s) Oral at bedtime  dextrose 5%. 1000 milliLiter(s) IV Continuous <Continuous>  dextrose 5%. 1000 milliLiter(s) IV Continuous <Continuous>  dextrose 50% Injectable 12.5 Gram(s) IV Push once  dextrose 50% Injectable 25 Gram(s) IV Push once  dextrose 50% Injectable 25 Gram(s) IV Push once  glucagon  Injectable 1 milliGRAM(s) IntraMuscular once  haloperidol     Tablet 1 milliGRAM(s) Oral daily  insulin lispro (ADMELOG) corrective regimen sliding scale   SubCutaneous three times a day before meals  lisinopril 40 milliGRAM(s) Oral daily  nicotine - 21 mG/24Hr(s) Patch 1 Patch Transdermal daily  sodium chloride 0.9%. 1000 milliLiter(s) IV Continuous <Continuous>    PRN MEDICATIONS  acetaminophen     Tablet .. 650 milliGRAM(s) Oral every 6 hours PRN  dextrose Oral Gel 15 Gram(s) Oral once PRN  melatonin 5 milliGRAM(s) Oral at bedtime PRN    VITALS:   T(F): 96.3  HR: 88  BP: 168/76  RR: --  SpO2: 98%    PHYSICAL EXAM:  GENERAL:   (+  ) NAD, lying in bed comfortably     (  ) obtunded     (  ) lethargic     (  ) somnolent    HEAD:   (+  ) Atraumatic     (  ) hematoma     (  ) laceration (specify location:       )     NECK:  ( + ) Supple     (  ) neck stiffness     (  ) nuchal rigidity     (  )  no JVD     (  ) JVD present ( -- cm)    HEART:  Rate -->     ( + ) normal rate     (  ) bradycardic     (  ) tachycardic  Rhythm -->     ( + ) regular     (  ) regularly irregular     (  ) irregularly irregular  Murmurs -->     ( + ) normal s1s2     (  ) systolic murmur     (  ) diastolic murmur     (  ) continuous murmur      (  ) S3 present     (  ) S4 present    LUNGS:   ( + )Unlabored respirations     (  ) tachypnea  ( + ) B/L air entry     (  ) decreased breath sounds in:  (location     )    ( + ) no adventitious sound     (  ) crackles     (  ) wheezing      (  ) rhonchi      (specify location:       )  (  ) chest wall tenderness (specify location:       )    ABDOMEN:   ( + ) Soft     (  ) tense   |   ( + ) nondistended     (  ) distended   |   (  ) +BS     (  ) hypoactive bowel sounds     (  ) hyperactive bowel sounds  ( + ) nontender     (  ) RUQ tenderness     (  ) RLQ tenderness     (  ) LLQ tenderness     (  ) epigastric tenderness     (  ) diffuse tenderness  (  ) Splenomegaly      (  ) Hepatomegaly      (  ) Jaundice     (  ) ecchymosis     EXTREMITIES: 2+ peripheral pulses bilaterally. No clubbing, cyanosis, or edema  ( + ) Normal     (  ) Rash     (  ) ecchymosis     (  ) varicose veins      (  ) pitting edema     (  ) non-pitting edema   (  ) ulceration     (  ) gangrene:     (location:     )    NERVOUS SYSTEM:    ( + ) A&Ox3     (  ) confused     (  ) lethargic  CN II-XII:     ( + ) Intact     (  ) deficits found     (Specify:     )   Upper extremities:     (  ) no sensorimotor deficits     (  ) weakness     (  ) loss of proprioception/vibration     (  ) loss of touch/temperature (specify:    )  Lower extremities:     (  ) no sensorimotor deficits     (  ) weakness     (  ) loss of proprioception/vibration     (  ) loss of touch/temperature (specify:    )    SKIN:   (  ) No rashes or lesions     (  ) maculopapular rash     (  ) pustules     (  ) vesicles     (  ) ulcer     (  ) ecchymosis     (specify location:     )    AMPAC score:    (  ) Indwelling Andre Catheter:   Date insterted:    Reason (  ) Critical illness     (  ) urinary retention    (  ) Accurate Ins/Outs Monitoring     (  ) CMO patient    (  ) Central Line:   Date inserted:  Location: (  ) Right IJ     (  ) Left IJ     (  ) Right Fem     (  ) Left Fem    (  ) SPC        (  ) pigtail       (  ) PEG tube       (  ) colostomy       (  ) jejunostomy  (  ) U-Dall    LABS:                        14.8   6.59  )-----------( 288      ( 04 Jul 2023 06:25 )             43.1     07-04    131<L>  |  95<L>  |  9<L>  ----------------------------<  151<H>  4.7   |  24  |  0.5<L>    Ca    9.4      04 Jul 2023 06:25  Phos  3.5     07-03  Mg     1.8     07-04    TPro  6.5  /  Alb  4.2  /  TBili  0.6  /  DBili  x   /  AST  24  /  ALT  19  /  AlkPhos  91  07-04      Urinalysis Basic - ( 04 Jul 2023 06:25 )    Color: x / Appearance: x / SG: x / pH: x  Gluc: 151 mg/dL / Ketone: x  / Bili: x / Urobili: x   Blood: x / Protein: x / Nitrite: x   Leuk Esterase: x / RBC: x / WBC x   Sq Epi: x / Non Sq Epi: x / Bacteria: x      ABG - ( 04 Jul 2023 09:53 )  pH, Arterial: 7.45  pH, Blood: x     /  pCO2: 41    /  pO2: 84    / HCO3: 28    / Base Excess: 4.1   /  SaO2: 98.5        CARDIAC MARKERS ( 03 Jul 2023 11:48 )  x     / <0.01 ng/mL / x     / x     / x      CARDIAC MARKERS ( 02 Jul 2023 21:55 )  x     / <0.01 ng/mL / x     / x     / x          RADIOLOGY:    < from: Xray Chest 1 View- PORTABLE-Routine (Xray Chest 1 View- PORTABLE-Routine in AM.) (07.04.23 @ 07:07) >    IMPRESSION:  No radiographic evidence of acute cardiopulmonary disease.  < end of copied text >

## 2023-07-04 NOTE — PROGRESS NOTE ADULT - SUBJECTIVE AND OBJECTIVE BOX
{\rtf1\alworn55331\ansi\sdaqaib6179\ftnbj\uc1\deff0  {\fonttbl{\f0 \fnil Segoe UI;}{\f1 \fnil \fcharset0 Segoe UI;}{\f2 \fnil Times New Trey;}}  {\colortbl ;\tpy298\mufag318\qcdi728 ;\red0\green0\blue0 ;\red0\green0\xwwp793 ;\red0\green0\blue0 ;}  {\stylesheet{\f0\fs20 Normal;}{\cs1 Default Paragraph Font;}{\cs2\f0\fs16 Line Number;}{\cs3\f2\fs24\ul\cf3 Hyperlink;}}  {\*\revtbl{Unknown;}}  \lovdvx07532\jyzbzs97878\wruej7375\wmtry9464\rlexu4416\tkils7104\gewrjbp528\zkbmxrb812\nogrowautofit\euqzkk385\formshade\nofeaturethrottle1\dntblnsbdb\fet4\aendnotes\aftnnrlc\pgbrdrhead\pgbrdrfoot  \sectd\psjaop50646\ihfeop96354\guttersxn0\qanawxzc3964\ndrayyzr7362\jymrgiip9801\qzjssguq6533\ovjeqsj585\zxtxhgk110\sbkpage\pgncont\pgndec  \plain\plain\f0\fs24\pard\plain\f0\fs24\plain\f0\fs20\wogn6033\hich\f0\dbch\f0\loch\f0\fs20 pt seen and examined. \par  \par  Resident's notes reviewed, My notes supersede resident's notes in case of discrepancy  \par   \par  ROS: no cp, no sob, no n/v, no fever\par  \par  Vital Signs Last 24 Hrs\par  T(C): 35.7 (03 Jul 2023 20:05), Max: 36.2 (03 Jul 2023 13:00)\par  T(F): 96.3 (03 Jul 2023 20:05), Max: 97.2 (03 Jul 2023 13:00)\par  HR: 88 (04 Jul 2023 05:14) (88 - 98)\par  BP: 168/76 (04 Jul 2023 05:14) (130/70 - 168/76)\par  BP(mean): --\par  RR: 20 (03 Jul 2023 13:00) (20 - 20)\par  SpO2: 98% (04 Jul 2023 05:14) (97% - 98%)\par  \par  Parameters below as of 04 Jul 2023 05:14\par  Patient On (Oxygen Delivery Method): room air\par  \par  \par  \par  physical exam\par  constitutional NAD, AAOX3, Respiratory  lungs CTA, CVS heart RRR, GI: abdomen Soft NT, ND, BS+, skin: intact\par  neuro exam Motor, sensory and CN normal, no deficit \par  \par  MEDICATIONS  (STANDING):\par  aspirin enteric coated 81 milliGRAM(s) Oral daily\par  atorvastatin 10 milliGRAM(s) Oral at bedtime\par  dextrose 5%. 1000 milliLiter(s) (50 mL/Hr) IV Continuous <Continuous>\par  dextrose 5%. 1000 milliLiter(s) (100 mL/Hr) IV Continuous <Continuous>\par  dextrose 50% Injectable 12.5 Gram(s) IV Push once\par  dextrose 50% Injectable 25 Gram(s) IV Push once\par  dextrose 50% Injectable 25 Gram(s) IV Push once\par  glucagon  Injectable 1 milliGRAM(s) IntraMuscular once\par  haloperidol     Tablet 1 milliGRAM(s) Oral daily\par  insulin lispro (ADMELOG) corrective regimen sliding scale   SubCutaneous three times a day before meals\par  lisinopril 40 milliGRAM(s) Oral daily\par  sodium chloride 0.9%. 1000 milliLiter(s) (75 mL/Hr) IV Continuous <Continuous>\par  \par  MEDICATIONS  (PRN):\par  acetaminophen     Tablet .. 650 milliGRAM(s) Oral every 6 hours PRN Temp greater or equal to 38C (100.4F), Mild Pain (1 - 3)\par  dextrose Oral Gel 15 Gram(s) Oral once PRN Blood Glucose LESS THAN 70 milliGRAM(s)/deciliter\par  melatonin 5 milliGRAM(s) Oral at bedtime PRN Insomnia\par  \par  \par           \par             14.8 \par  6.59  )-----------( 288      ( 04 Jul 2023 06:25 )\par             43.1 \par  \par  07-04\par  \par  131<L>  |  95<L>  |  9<L>\par  ----------------------------<  151<H>\par  4.7   |  24  |  0.5<L>\par  \par  Ca    9.4      04 Jul 2023 06:25\par  Phos  3.5     07-03\par  Mg     1.8     07-04\par  \par  TPro  6.5  /  Alb  4.2  /  TBili  0.6  /  DBili  x   /  AST  24  /  ALT  19  /  AlkPhos  91  07-04\par  \par  \par  CARDIAC MARKERS ( 03 Jul 2023 11:48 )\par  x     / <0.01 ng/mL / x     / x     / x    \par  CARDIAC MARKERS ( 02 Jul 2023 21:55 )\par  x     / <0.01 ng/mL / x     / x     / x    \par  \par  < from: CT Angio Brain Stroke Protocol  w/ IV Cont (07.03.23 @ 05:41) >\par  \ql\plain\f0\fs24\plain\f1\fs16\kdot2583\hich\f1\dbch\f1\loch\f1\cf2\fs16 CT PERFUSION:\par  No perfusion deficits to suggest areas of completed infarction or at risk \par  territory.\par  \par  CTA HEAD/NECK:\par  No large vessel occlusion, aneurysm, or vascular malformation.\par  \par  Segments of mild stenosis above.\par  \par  4 mm left upper lobe pulmonary nodule, consider follow-up chest CT in 12 \par  \pard\plain\f0\fs24\plain\f1\fs16\jmpn2271\hich\f1\dbch\f1\loch\f1\cf2\fs16 months if the patient is considered high risk.\par  \plain\f0\fs20\vjgn7558\hich\f0\dbch\f0\loch\f0\fs20\par  < end of copied text >\par  \ql\plain\f0\fs24\plain\f0\fs20\taqf7703\hich\f0\dbch\f0\loch\f0\fs20\par  \pard\plain\f0\fs24\plain\f0\fs20\ekgk0847\hich\f0\dbch\f0\loch\f0\fs20 < from: EEG (07.03.23 @ 13:00) >\par  \ql\plain\f0\fs24\plain\f0\fs20\fwgs2196\hich\f0\dbch\f0\loch\f0\fs20\par  Interictal Activity:  None\par  Location:  as above\par  Focal Slowing:  None\par  \pard\plain\f0\fs24\plain\f0\fs20\bqfu1788\hich\f0\dbch\f0\loch\f0\fs20 Generalized Slowing:  Mild\par  \par  < end of copied text >\par  \ql\plain\f0\fs24\plain\f0\fs20\oynl3883\hich\f0\dbch\f0\loch\f0\fs20\par  a/p\par   a 67 year old male with a PMH of DM and HTN presents for Altered mental status of 1 day duration. history goes back to 1 week ago were the patient was note to be altered and hypoglycemia in the [facility]. As per the nursing home the patient   is usually not oriented, but awake and is able to walk. on the day of admission the patient was note to have a seizure like episode, he became unresponsive after using the bathroom, L pupil > R pupil, /89. stroke code was called in ER\par  \par  He was assisted to the bed when he became unresponsive to verbal stimulation. Bedside . Pt NIHSS was 23 stroke team assessment however improved to 3 for incorrect ans and RUE drift. seizure like event w/ RENEE \par  neuro notes appreciated: \par  rEEG\par  repeat routine labs \par  start Thiamine therapy \par  start Aspirin 81mg ECAD\par  \par  This am again pt had an episode of confusion and suspected seizure activity. \par  \par  # AMS, possible seizure, possible due to hypoglycemia, pt diabetic on insulin ( not Lantus on hold) \par  check eEG, \par  cont thiamin and asa\par  monitor fs, avoid hypoglycemia \par  \pard\plain\f0\fs24\plain\f0\fs20\fotl6445\hich\f0\dbch\f0\loch\f0\fs20 POCT Blood Glucose.: 175 mg/dL (07-04-23 @ 08:26)\par  POCT Blood Glucose.: 139 mg/dL (07-03-23 @ 23:00)\par  POCT Blood Glucose.: 229 mg/dL (07-03-23 @ 17:23)\par  POCT Blood Glucose.: 152 mg/dL (07-03-23 @ 11:47)\par  \ql\plain\f0\fs24\plain\f0\fs20\zenv2983\hich\f0\dbch\f0\loch\f0\fs20\par  \par  # sob, due to copd , not in exacerbation \par  cont nebs \par  outpt fu \par  \pard\plain\f0\fs24\plain\f0\fs20\tjzt9270\hich\f0\dbch\f0\loch\f0\fs20 ABG - ( 04 Jul 2023 09:53 )\par  pH, Arterial: 7.45  pH, Blood: x     /  pCO2: 41    /  pO2: 84    / HCO3: 28    / Base Excess: 4.1   /  SaO2: 98.5  \par  \ql\plain\f0\fs24\plain\f0\fs20\qvee7891\hich\f0\dbch\f0\loch\f0\fs20\par  # hypertensive urgency : now controlled, \par  \pard\plain\f0\fs24\plain\f0\fs20\roqj3652\hich\f0\dbch\f0\loch\f0\fs20\par  BP: 168/76 (07-04-23 @ 05:14) (130/70 - 168/76)\par  \par  \ql\plain\f0\fs24\plain\f0\fs20\whgo5257\hich\f0\dbch\f0\loch\f0\fs20\par  # unknown hx of etoh, pt states he does not drink at all \par  cont thiamin supplement, \par  \par  # active tobacco smoker, 1-2 pack a day , counseled,  agrees with nicotine patch, ordered nicotine 21 mg /d \par  \par  #Progress Note Handoff\par  Pending: clinical improvement \par  Family discussion: resident dw pt \par  Disposition: Adult home\par  \par  {\*\bkmkstart [id number]}{\*\bkmkend [id number]}Risk Statement (NON-critical care). \par  {\*\bkmkstart [id number]}{\*\bkmkend [id number]}On this date of service, level of risk to patient is considered: {\*\bkmkstart [id number]}{\*\bkmkend [id number]}High. \par  {\*\bkmkstart [id number]}{\*\bkmkend [id number]}Due to: {\*\bkmkstart [id number]}{\*\bkmkend [id number]}risk of seizure, severe hypoglycemia and hypertension urgency.\plain\f1\fs20\tnxz9941\hich\f1\dbch\f1\loch\f1\cf2\fs20\strike\plain\f1\fs20\fgub8738\hich\f1\dbch\f1\loch\f1\cf2\fs20\plain\f0\fs20\qmdx7727\hich\f0\dbch\f0\loch\f0\fs20\par  \par  \pard\plain\f0\fs24\plain\f0\fs20\vaxl5577\hich\f0\dbch\f0\loch\f0\fs20\par  \par  \par  \par  \par  \par  \par  \par  \par  \par  \ql\plain\f0\fs24\plain\f0\fs20\rvep7590\hich\f0\dbch\f0\loch\f0\fs20\par  }   pt seen and examined.     Resident's notes reviewed, My notes supersede resident's notes in case of discrepancy       ROS: no cp, no sob, no n/v, no fever    Vital Signs Last 24 Hrs  T(C): 35.7 (03 Jul 2023 20:05), Max: 36.2 (03 Jul 2023 13:00)  T(F): 96.3 (03 Jul 2023 20:05), Max: 97.2 (03 Jul 2023 13:00)  HR: 88 (04 Jul 2023 05:14) (88 - 98)  BP: 168/76 (04 Jul 2023 05:14) (130/70 - 168/76)  BP(mean): --  RR: 20 (03 Jul 2023 13:00) (20 - 20)  SpO2: 98% (04 Jul 2023 05:14) (97% - 98%)    Parameters below as of 04 Jul 2023 05:14  Patient On (Oxygen Delivery Method): room air        physical exam  constitutional NAD, AAOX3, Respiratory  lungs CTA, CVS heart RRR, GI: abdomen Soft NT, ND, BS+, skin: intact  neuro exam Motor, sensory and CN normal, no deficit     MEDICATIONS  (STANDING):  aspirin enteric coated 81 milliGRAM(s) Oral daily  atorvastatin 10 milliGRAM(s) Oral at bedtime  dextrose 5%. 1000 milliLiter(s) (50 mL/Hr) IV Continuous <Continuous>  dextrose 5%. 1000 milliLiter(s) (100 mL/Hr) IV Continuous <Continuous>  dextrose 50% Injectable 12.5 Gram(s) IV Push once  dextrose 50% Injectable 25 Gram(s) IV Push once  dextrose 50% Injectable 25 Gram(s) IV Push once  glucagon  Injectable 1 milliGRAM(s) IntraMuscular once  haloperidol     Tablet 1 milliGRAM(s) Oral daily  insulin lispro (ADMELOG) corrective regimen sliding scale   SubCutaneous three times a day before meals  lisinopril 40 milliGRAM(s) Oral daily  sodium chloride 0.9%. 1000 milliLiter(s) (75 mL/Hr) IV Continuous <Continuous>    MEDICATIONS  (PRN):  acetaminophen     Tablet .. 650 milliGRAM(s) Oral every 6 hours PRN Temp greater or equal to 38C (100.4F), Mild Pain (1 - 3)  dextrose Oral Gel 15 Gram(s) Oral once PRN Blood Glucose LESS THAN 70 milliGRAM(s)/deciliter  melatonin 5 milliGRAM(s) Oral at bedtime PRN Insomnia                            14.8   6.59  )-----------( 288      ( 04 Jul 2023 06:25 )             43.1     07-04    131<L>  |  95<L>  |  9<L>  ----------------------------<  151<H>  4.7   |  24  |  0.5<L>    Ca    9.4      04 Jul 2023 06:25  Phos  3.5     07-03  Mg     1.8     07-04    TPro  6.5  /  Alb  4.2  /  TBili  0.6  /  DBili  x   /  AST  24  /  ALT  19  /  AlkPhos  91  07-04      CARDIAC MARKERS ( 03 Jul 2023 11:48 )  x     / <0.01 ng/mL / x     / x     / x      CARDIAC MARKERS ( 02 Jul 2023 21:55 )  x     / <0.01 ng/mL / x     / x     / x        < from: CT Angio Brain Stroke Protocol  w/ IV Cont (07.03.23 @ 05:41) >  CT PERFUSION:  No perfusion deficits to suggest areas of completed infarction or at risk   territory.    CTA HEAD/NECK:  No large vessel occlusion, aneurysm, or vascular malformation.    Segments of mild stenosis above.    4 mm left upper lobe pulmonary nodule, consider follow-up chest CT in 12   months if the patient is considered high risk.    < end of copied text >    < from: EEG (07.03.23 @ 13:00) >    Interictal Activity:  None  Location:  as above  Focal Slowing:  None  Generalized Slowing:  Mild    < end of copied text >    a/p   a 67 year old male with a PMH of DM and HTN presents for Altered mental status of 1 day duration. history goes back to 1 week ago were the patient was note to be altered and hypoglycemia in the [facility]. As per the nursing home the patient is usually not oriented, but awake and is able to walk. on the day of admission the patient was note to have a seizure like episode, he became unresponsive after using the bathroom, L pupil > R pupil, /89. stroke code was called in ER    He was assisted to the bed when he became unresponsive to verbal stimulation. Bedside . Pt NIHSS was 23 stroke team assessment however improved to 3 for incorrect ans and RUE drift. seizure like event w/ RENEE   neuro notes appreciated:   rEEG  repeat routine labs   start Thiamine therapy   start Aspirin 81mg ECAD    This am again pt had an episode of confusion and suspected seizure activity.     # AMS, possible seizure, possible due to hypoglycemia, pt diabetic on insulin ( not Lantus on hold)   check eEG,   cont thiamin and asa  monitor fs, avoid hypoglycemia   POCT Blood Glucose.: 175 mg/dL (07-04-23 @ 08:26)  POCT Blood Glucose.: 139 mg/dL (07-03-23 @ 23:00)  POCT Blood Glucose.: 229 mg/dL (07-03-23 @ 17:23)  POCT Blood Glucose.: 152 mg/dL (07-03-23 @ 11:47)      # sob, due to copd , not in exacerbation   cont nebs   outpt fu   ABG - ( 04 Jul 2023 09:53 )  pH, Arterial: 7.45  pH, Blood: x     /  pCO2: 41    /  pO2: 84    / HCO3: 28    / Base Excess: 4.1   /  SaO2: 98.5      cxr is wnl , interpreted independently by me    # hypertensive urgency : now controlled,     BP: 168/76 (07-04-23 @ 05:14) (130/70 - 168/76)      # unknown hx of etoh, pt states he does not drink at all   cont thiamin supplement,     # active tobacco smoker, 1-2 pack a day , counseled,  agrees with nicotine patch, ordered nicotine 21 mg /d     #Progress Note Handoff  Pending: clinical improvement , anticipate dc in 24 hrs  Family discussion: resident carmen pt   Disposition: Adult home    Risk Statement (NON-critical care).   On this date of service, level of risk to patient is considered: High.   Due to: risk of seizure, severe hypoglycemia and hypertension urgency.

## 2023-07-05 LAB
ANION GAP SERPL CALC-SCNC: 17 MMOL/L — HIGH (ref 7–14)
BASOPHILS # BLD AUTO: 0.08 K/UL — SIGNIFICANT CHANGE UP (ref 0–0.2)
BASOPHILS NFR BLD AUTO: 1 % — SIGNIFICANT CHANGE UP (ref 0–1)
BUN SERPL-MCNC: 13 MG/DL — SIGNIFICANT CHANGE UP (ref 10–20)
CALCIUM SERPL-MCNC: 9.9 MG/DL — SIGNIFICANT CHANGE UP (ref 8.4–10.5)
CHLORIDE SERPL-SCNC: 92 MMOL/L — LOW (ref 98–110)
CO2 SERPL-SCNC: 21 MMOL/L — SIGNIFICANT CHANGE UP (ref 17–32)
CREAT SERPL-MCNC: 0.6 MG/DL — LOW (ref 0.7–1.5)
EGFR: 104 ML/MIN/1.73M2 — SIGNIFICANT CHANGE UP
EOSINOPHIL # BLD AUTO: 0.11 K/UL — SIGNIFICANT CHANGE UP (ref 0–0.7)
EOSINOPHIL NFR BLD AUTO: 1.4 % — SIGNIFICANT CHANGE UP (ref 0–8)
GLUCOSE BLDC GLUCOMTR-MCNC: 185 MG/DL — HIGH (ref 70–99)
GLUCOSE BLDC GLUCOMTR-MCNC: 187 MG/DL — HIGH (ref 70–99)
GLUCOSE BLDC GLUCOMTR-MCNC: 207 MG/DL — HIGH (ref 70–99)
GLUCOSE BLDC GLUCOMTR-MCNC: 236 MG/DL — HIGH (ref 70–99)
GLUCOSE SERPL-MCNC: 202 MG/DL — HIGH (ref 70–99)
HCT VFR BLD CALC: 44.8 % — SIGNIFICANT CHANGE UP (ref 42–52)
HGB BLD-MCNC: 15.5 G/DL — SIGNIFICANT CHANGE UP (ref 14–18)
IMM GRANULOCYTES NFR BLD AUTO: 0.5 % — HIGH (ref 0.1–0.3)
LYMPHOCYTES # BLD AUTO: 1.01 K/UL — LOW (ref 1.2–3.4)
LYMPHOCYTES # BLD AUTO: 12.5 % — LOW (ref 20.5–51.1)
MAGNESIUM SERPL-MCNC: 1.8 MG/DL — SIGNIFICANT CHANGE UP (ref 1.8–2.4)
MCHC RBC-ENTMCNC: 30 PG — SIGNIFICANT CHANGE UP (ref 27–31)
MCHC RBC-ENTMCNC: 34.6 G/DL — SIGNIFICANT CHANGE UP (ref 32–37)
MCV RBC AUTO: 86.8 FL — SIGNIFICANT CHANGE UP (ref 80–94)
MONOCYTES # BLD AUTO: 0.81 K/UL — HIGH (ref 0.1–0.6)
MONOCYTES NFR BLD AUTO: 10 % — HIGH (ref 1.7–9.3)
NEUTROPHILS # BLD AUTO: 6.03 K/UL — SIGNIFICANT CHANGE UP (ref 1.4–6.5)
NEUTROPHILS NFR BLD AUTO: 74.6 % — SIGNIFICANT CHANGE UP (ref 42.2–75.2)
NRBC # BLD: 0 /100 WBCS — SIGNIFICANT CHANGE UP (ref 0–0)
PLATELET # BLD AUTO: 303 K/UL — SIGNIFICANT CHANGE UP (ref 130–400)
PMV BLD: 10.7 FL — HIGH (ref 7.4–10.4)
POTASSIUM SERPL-MCNC: 4.7 MMOL/L — SIGNIFICANT CHANGE UP (ref 3.5–5)
POTASSIUM SERPL-SCNC: 4.7 MMOL/L — SIGNIFICANT CHANGE UP (ref 3.5–5)
RBC # BLD: 5.16 M/UL — SIGNIFICANT CHANGE UP (ref 4.7–6.1)
RBC # FLD: 12 % — SIGNIFICANT CHANGE UP (ref 11.5–14.5)
SODIUM SERPL-SCNC: 130 MMOL/L — LOW (ref 135–146)
WBC # BLD: 8.08 K/UL — SIGNIFICANT CHANGE UP (ref 4.8–10.8)
WBC # FLD AUTO: 8.08 K/UL — SIGNIFICANT CHANGE UP (ref 4.8–10.8)

## 2023-07-05 PROCEDURE — 99232 SBSQ HOSP IP/OBS MODERATE 35: CPT

## 2023-07-05 RX ORDER — METFORMIN HYDROCHLORIDE 850 MG/1
1 TABLET ORAL
Qty: 180 | Refills: 0
Start: 2023-07-05 | End: 2023-10-02

## 2023-07-05 RX ORDER — METFORMIN HYDROCHLORIDE 850 MG/1
1 TABLET ORAL
Qty: 180 | Refills: 3
Start: 2023-07-05 | End: 2024-06-28

## 2023-07-05 RX ORDER — ASPIRIN/CALCIUM CARB/MAGNESIUM 324 MG
1 TABLET ORAL
Qty: 30 | Refills: 0
Start: 2023-07-05 | End: 2023-08-03

## 2023-07-05 RX ORDER — ASPIRIN/CALCIUM CARB/MAGNESIUM 324 MG
1 TABLET ORAL
Refills: 0
Start: 2023-07-05

## 2023-07-05 RX ORDER — INSULIN GLARGINE 100 [IU]/ML
30 INJECTION, SOLUTION SUBCUTANEOUS
Refills: 0 | DISCHARGE

## 2023-07-05 RX ORDER — METFORMIN HYDROCHLORIDE 850 MG/1
1 TABLET ORAL
Refills: 0
Start: 2023-07-05

## 2023-07-05 RX ORDER — NICOTINE POLACRILEX 2 MG
1 GUM BUCCAL
Qty: 0 | Refills: 0 | DISCHARGE
Start: 2023-07-05

## 2023-07-05 RX ORDER — ASPIRIN/CALCIUM CARB/MAGNESIUM 324 MG
1 TABLET ORAL
Qty: 0 | Refills: 0 | DISCHARGE
Start: 2023-07-05

## 2023-07-05 RX ADMIN — HALOPERIDOL DECANOATE 1 MILLIGRAM(S): 100 INJECTION INTRAMUSCULAR at 11:10

## 2023-07-05 RX ADMIN — Medication 1: at 11:45

## 2023-07-05 RX ADMIN — Medication 1 PATCH: at 11:24

## 2023-07-05 RX ADMIN — Medication 1 PATCH: at 06:40

## 2023-07-05 RX ADMIN — Medication 3 MILLILITER(S): at 13:09

## 2023-07-05 RX ADMIN — Medication 3 MILLILITER(S): at 20:06

## 2023-07-05 RX ADMIN — Medication 81 MILLIGRAM(S): at 11:10

## 2023-07-05 RX ADMIN — Medication 2: at 17:20

## 2023-07-05 RX ADMIN — ATORVASTATIN CALCIUM 10 MILLIGRAM(S): 80 TABLET, FILM COATED ORAL at 21:19

## 2023-07-05 RX ADMIN — LISINOPRIL 40 MILLIGRAM(S): 2.5 TABLET ORAL at 05:04

## 2023-07-05 RX ADMIN — Medication 3 MILLILITER(S): at 07:31

## 2023-07-05 RX ADMIN — Medication 2: at 08:07

## 2023-07-05 NOTE — PROGRESS NOTE ADULT - SUBJECTIVE AND OBJECTIVE BOX
ARLEN CALDERON  67y  Male      Patient is a 67y old  Male who presents with a chief complaint of AMS       INTERVAL HPI/OVERNIGHT EVENTS:      ******************************* REVIEW OF SYSTEMS:**********************************************    All other review of systems negative    *********************** VITALS ******************************************    T(F): 98.1 (07-05-23 @ 04:54)  HR: 78 (07-05-23 @ 04:54) (78 - 81)  BP: 167/78 (07-05-23 @ 04:54) (161/84 - 167/78)  RR: 19 (07-05-23 @ 04:54) (19 - 20)  SpO2: 95% (07-05-23 @ 04:54) (95% - 100%)            ******************************** PHYSICAL EXAM:**************************************************  GENERAL: NAD    PSYCH: no agitation, baseline mentation  HEENT:     NERVOUS SYSTEM:  Alert & Oriented X2-3,   PULMONARY: BIRD, CTA    CARDIOVASCULAR: S1S2 RRR    GI: Soft, NT, ND; BS present.    EXTREMITIES:  2+ Peripheral Pulses, No clubbing, cyanosis, or edema    LYMPH: No lymphadenopathy noted    SKIN: No rashes or lesions      **************************** LABS *******************************************************                          15.5   8.08  )-----------( 303      ( 05 Jul 2023 08:11 )             44.8     07-05    130<L>  |  92<L>  |  13  ----------------------------<  202<H>  4.7   |  21  |  0.6<L>    Ca    9.9      05 Jul 2023 08:11  Mg     1.8     07-05    TPro  6.5  /  Alb  4.2  /  TBili  0.6  /  DBili  x   /  AST  24  /  ALT  19  /  AlkPhos  91  07-04    ABG - ( 04 Jul 2023 09:53 )  pH, Arterial: 7.45  pH, Blood: x     /  pCO2: 41    /  pO2: 84    / HCO3: 28    / Base Excess: 4.1   /  SaO2: 98.5              Urinalysis Basic - ( 05 Jul 2023 08:11 )    Color: x / Appearance: x / SG: x / pH: x  Gluc: 202 mg/dL / Ketone: x  / Bili: x / Urobili: x   Blood: x / Protein: x / Nitrite: x   Leuk Esterase: x / RBC: x / WBC x   Sq Epi: x / Non Sq Epi: x / Bacteria: x        Lactate Trend        CAPILLARY BLOOD GLUCOSE      POCT Blood Glucose.: 187 mg/dL (05 Jul 2023 11:44)          **************************Active Medications *******************************************  No Known Allergies      acetaminophen     Tablet .. 650 milliGRAM(s) Oral every 6 hours PRN  albuterol/ipratropium for Nebulization 3 milliLiter(s) Nebulizer every 6 hours  aspirin enteric coated 81 milliGRAM(s) Oral daily  atorvastatin 10 milliGRAM(s) Oral at bedtime  dextrose 5%. 1000 milliLiter(s) IV Continuous <Continuous>  dextrose 5%. 1000 milliLiter(s) IV Continuous <Continuous>  dextrose 50% Injectable 12.5 Gram(s) IV Push once  dextrose 50% Injectable 25 Gram(s) IV Push once  dextrose 50% Injectable 25 Gram(s) IV Push once  dextrose Oral Gel 15 Gram(s) Oral once PRN  glucagon  Injectable 1 milliGRAM(s) IntraMuscular once  haloperidol     Tablet 1 milliGRAM(s) Oral daily  insulin lispro (ADMELOG) corrective regimen sliding scale   SubCutaneous three times a day before meals  lisinopril 40 milliGRAM(s) Oral daily  melatonin 5 milliGRAM(s) Oral at bedtime PRN  nicotine - 21 mG/24Hr(s) Patch 1 Patch Transdermal daily  sodium chloride 0.9%. 1000 milliLiter(s) IV Continuous <Continuous>      ***************************************************  RADIOLOGY & ADDITIONAL TESTS:    Imaging Personally Reviewed:  [ ] YES  [ ] NO    HEALTH ISSUES - PROBLEM Dx:

## 2023-07-05 NOTE — DISCHARGE NOTE NURSING/CASE MANAGEMENT/SOCIAL WORK - PATIENT PORTAL LINK FT
You can access the FollowMyHealth Patient Portal offered by [facility] by registering at the following website: [email or web address]. By joining [facility]’s FollowMyHealth portal, you will also be able to view your health information using other applications (apps) compatible with our system.

## 2023-07-05 NOTE — DISCHARGE NOTE PROVIDER - CARE PROVIDER_API CALL
Riaz Rainey  Internal Medicine  [address] [place] [address]  Phone: [phone]  Fax: [phone]  Established Patient  Follow Up Time: 2 weeks

## 2023-07-05 NOTE — DISCHARGE NOTE NURSING/CASE MANAGEMENT/SOCIAL WORK - NSDCPEFALRISK_GEN_ALL_CORE
For information on Fall & Injury Prevention, visit: [email or web address] OR  [email or web address] OR  [email or web address]://www.cdc.gov/steadi/patient.html

## 2023-07-05 NOTE — DISCHARGE NOTE PROVIDER - NSDCMRMEDTOKEN_GEN_ALL_CORE_FT
Haldol 1 mg oral tablet: 1 tab(s) orally once a day  Lantus 100 units/mL subcutaneous solution: 30 international unit(s) subcutaneous once a day  Lipitor 10 mg oral tablet: 1 tab(s) orally once a day  Prinivil 40 mg oral tablet: 1 tab(s) orally once a day   aspirin 81 mg oral delayed release tablet: 1 tab(s) orally once a day  Haldol 1 mg oral tablet: 1 tab(s) orally once a day  Lipitor 10 mg oral tablet: 1 tab(s) orally once a day  nicotine 21 mg/24 hr transdermal film, extended release: 1 patch transdermal once a day  Prinivil 40 mg oral tablet: 1 tab(s) orally once a day   aspirin 81 mg oral delayed release tablet: 1 tab(s) orally once a day  Haldol 1 mg oral tablet: 1 tab(s) orally once a day  Lipitor 10 mg oral tablet: 1 tab(s) orally once a day  metFORMIN 500 mg oral tablet, extended release: 1 tab(s) orally 2 times a day  nicotine 21 mg/24 hr transdermal film, extended release: 1 patch transdermal once a day  Prinivil 40 mg oral tablet: 1 tab(s) orally once a day

## 2023-07-05 NOTE — DISCHARGE NOTE PROVIDER - HOSPITAL COURSE
a 67 year old male with a PMH of DM and HTN presents for Altered mental status of 1 day duration. hisotry goes back to 1 week ago were the patient was note to be altered and hypoglycemia in the [facility] nursing [place]. As per the nursing home the patient is usually not oriented, but awake and is able to walk. Today the patient was note to have a seizure like episode, he became unresponsive after using the bathroom, L pupil > R pupil, /89. Initially patient not responding to voice or painful stimuli. Repeat BP was systolic 140. Code Stroke was also called. After approximately 10 minutes, patient started to follow command and respond to voice. Patient was sent for a CT scan of the head and the neurology resident was called. A CT scan prelim red was negative for a bleed or a large stroke Patient was placed on  a Nasal canula. Limited history could be taken from the Nursing home. Labs were noticeable for a FS of 35 given dextrose push in ED.  I called the [facility] and all they could say is that the patient was sent for AMS   VBG: pH 7.34, pCO2 59  VS noticeable for a BP of 200/89 during the stroke code and the seizure episode.     Vital Signs Last 24 Hrs  T(C): 36.4 (03 Jul 2023 01:07), Max: 36.4 (03 Jul 2023 01:07)  T(F): 97.6 (03 Jul 2023 01:07), Max: 97.6 (03 Jul 2023 01:07)  HR: 99 (03 Jul 2023 01:07) (88 - 99)  BP: 159/67 (03 Jul 2023 01:07) (135/65 - 166/87)  BP(mean): 96 (03 Jul 2023 01:07) (96 - 96)  RR: 18 (03 Jul 2023 01:07) (16 - 19)  SpO2: 99% (03 Jul 2023 01:07) (96% - 99%)  Patient On (Oxygen Delivery Method): room air a 67 year old male with a PMH of DM and HTN presents for Altered mental status,  1 week ago the patient was note to be altered and   hypoglycemia in the [facility]. As per the nursing home the patient is usually not oriented, but awake and is able to walk. On admission the patient was note to have a   seizure like episode, he became unresponsive after using the bathroom, L pupil > R pupil, /89. Initially patient not responding to voice or painful stimuli.   Repeat BP was systolic 140. Code Stroke was called. After approximately 10 minutes, patient started to follow command and respond to voice.   Patient was sent for a CT scan of the head and the neurology resident was called. A CT scan prelim read was negative for a bleed or a large stroke. Patient was placed on Nasal canula.   Limited history could be taken from the Nursing home.     Labs were noticeable for a FS of 35 given dextrose push in ED.    VBG: pH 7.34, pCO2 59  VS noticeable for a BP of 200/89 during the stroke code and the seizure episode.     Vital Signs Last 24 Hrs  T(C): 36.4 (03 Jul 2023 01:07), Max: 36.4 (03 Jul 2023 01:07)  T(F): 97.6 (03 Jul 2023 01:07), Max: 97.6 (03 Jul 2023 01:07)  HR: 99 (03 Jul 2023 01:07) (88 - 99)  BP: 159/67 (03 Jul 2023 01:07) (135/65 - 166/87)  BP(mean): 96 (03 Jul 2023 01:07) (96 - 96)  RR: 18 (03 Jul 2023 01:07) (16 - 19)  SpO2: 99% (03 Jul 2023 01:07) (96% - 99%)  Patient On (Oxygen Delivery Method): room air    Patient was admitted to medicine for AMS secondary to hypoglycemia and seizure work up. Hypoglycemia has been managed with sliding scale insulin lispro and blood glucose has ranged from (130s-220s) since admission.   You had an EEG to monitor for seizure activity in the brain which showed no electrographic seizures or significant clinical events. You were seen by neurology and started on thiamine and aspirin. a 67 year old male with a PMH of DM and HTN presents for Altered mental status,  1 week ago the patient was note to be altered and   hypoglycemia in the [facility]. As per the nursing home the patient is usually not oriented, but awake and is able to walk. On admission the patient was note to have a   seizure like episode, he became unresponsive after using the bathroom, L pupil > R pupil, /89. Initially patient not responding to voice or painful stimuli.   Repeat BP was systolic 140. Code Stroke was called. After approximately 10 minutes, patient started to follow command and respond to voice.   Patient was sent for a CT scan of the head and the neurology resident was called. A CT scan prelim read was negative for a bleed or a large stroke.   Patient was placed on Nasal canula with improvement in oxygen saturation.   Limited history could be taken from the Nursing home.     Labs were noticeable for a FS of 35 given dextrose push in ED.    VBG: pH 7.34, pCO2 59  VS noticeable for a BP of 200/89 during the stroke code and the seizure episode.     Vital Signs Last 24 Hrs  T(C): 36.4 (03 Jul 2023 01:07), Max: 36.4 (03 Jul 2023 01:07)  T(F): 97.6 (03 Jul 2023 01:07), Max: 97.6 (03 Jul 2023 01:07)  HR: 99 (03 Jul 2023 01:07) (88 - 99)  BP: 159/67 (03 Jul 2023 01:07) (135/65 - 166/87)  BP(mean): 96 (03 Jul 2023 01:07) (96 - 96)  RR: 18 (03 Jul 2023 01:07) (16 - 19)  SpO2: 99% (03 Jul 2023 01:07) (96% - 99%)  Patient On (Oxygen Delivery Method): room air    Patient was admitted to medicine for AMS secondary to hypoglycemia and seizure work up. Hypoglycemia has been managed with sliding scale insulin lispro and blood glucose has ranged from (130s-220s) since admission.   An EEG was performed to monitor for seizure activity in the brain which showed no electrographic seizures or significant clinical events. Chest xray showed no cardiopulmonary disease. You were seen by neurology and started on thiamine and aspirin.     # AMS, likely due to hypoglycemia   # diabetic on insulin   - EEG: < from: EEG (07.03.23 @ 13:00) >No electrographic seizures or significant clinical events.  - cont thiamin and asa  - monitor fs, avoid hypoglycemia   - A1c 8.1%  - unclear if patient has the mental capacity to comply with Lantus outpatient  - discontinue Lantus outpatient  - Start metformin 500 mg BID    # SOB, due to COPD, not in exacerbation   - cont nebs   - 4 mm left upper lobe pulmonary nodule, consider follow-up chest CT in 12 months  - ABG - ( 04 Jul 2023 09:53 ) pH, Arterial: 7.45  pH, Blood: x     /  pCO2: 41    /  pO2: 84    / HCO3: 28    / Base Excess: 4.1   /  SaO2: 98.5    - f/u outpatient    # hypertensive urgency : now controlled   BP: 168/76 (07-04-23 @ 05:14) (130/70 - 168/76)    # unknown hx of etoh  - pt states he does not drink at all   - f/u urine drug screen  - cont thiamine supplement     # active tobacco smoker, 1-2 pack a day  - counseled  - can continue nicotine 21 mg /d outpatient    Patient is now medically stable and can be discharged on his new diabetes medication metformin,   please follow up with PCP for management of diabetes and consider CT chest in 12 months for pulmonary nodule.

## 2023-07-05 NOTE — PROGRESS NOTE ADULT - ASSESSMENT
67 year old male with a PMH of DM and HTN presents for Altered mental status of 1 day duration. history goes back to 1 week ago were the patient was note to be altered and hypoglycemia in the [facility] home. As per the nursing home the patient is usually not oriented, but awake and is able to walk. on the day of admission the patient was note to have a seizure like episode, he became unresponsive after using the bathroom, L pupil > R pupil, /89. stroke code was called in ER  He was assisted to the bed when he became unresponsive to verbal stimulation. Bedside . Pt NIHSS was 23 stroke team assessment however improved to 3 for incorrect ans and RUE drift. seizure like event w/ RENEE   neuro notes appreciated:      # AMS, likely metabolic encephalopathy possible due to hypoglycemia,    pt diabetic on insulin ( now Lantus on hold)    Will switch to Metformin for better compliance on dc.   cont thiamin and asa  monitor fs, avoid hypoglycemia   POCT Blood Glucose.: 175 mg/dL (07-04-23 @ 08:26)  POCT Blood Glucose.: 139 mg/dL (07-03-23 @ 23:00)  POCT Blood Glucose.: 229 mg/dL (07-03-23 @ 17:23)    # sob, due to copd , not in exacerbation   cont nebs   outpt fu   ABG - ( 04 Jul 2023 09:53 )  pH, Arterial: 7.45  pH, Blood: x     /  pCO2: 41    /  pO2: 84    / HCO3: 28    / Base Excess: 4.1   /  SaO2: 98.5      cxr is wnl , interpreted independently by me    # Hypertensive urgency : now better controlled,     # unknown hx of etoh, pt states he does not drink at all   cont thiamin supplement,     # active tobacco smoker, 1-2 pack a day , counseled,  agrees with nicotine patch, ordered nicotine 21 mg /d     #Progress Note Handoff  DC planning to    Disposition: Adult home

## 2023-07-05 NOTE — DISCHARGE NOTE PROVIDER - NSDCCPCAREPLAN_GEN_ALL_CORE_FT
PRINCIPAL DISCHARGE DIAGNOSIS  Diagnosis: Hypoglycemia  Assessment and Plan of Treatment: You came in with altered mental status likely due to hypoglycemia secondary to uncontrolled diabetes. We switched your home medication from lantus to oral metoformin 500 mg twice daily. Please take medication as prescribed and moniter blood sugar. Please follow up with your PCP in two weeks to follow up on new medication tolerance and blood sugars.

## 2023-07-06 VITALS
SYSTOLIC BLOOD PRESSURE: 147 MMHG | HEART RATE: 85 BPM | OXYGEN SATURATION: 96 % | RESPIRATION RATE: 19 BRPM | TEMPERATURE: 98 F | DIASTOLIC BLOOD PRESSURE: 80 MMHG

## 2023-07-06 LAB
ANION GAP SERPL CALC-SCNC: 11 MMOL/L — SIGNIFICANT CHANGE UP (ref 7–14)
BASOPHILS # BLD AUTO: 0.08 K/UL — SIGNIFICANT CHANGE UP (ref 0–0.2)
BASOPHILS NFR BLD AUTO: 1.2 % — HIGH (ref 0–1)
BUN SERPL-MCNC: 13 MG/DL — SIGNIFICANT CHANGE UP (ref 10–20)
CALCIUM SERPL-MCNC: 9.9 MG/DL — SIGNIFICANT CHANGE UP (ref 8.4–10.5)
CHLORIDE SERPL-SCNC: 92 MMOL/L — LOW (ref 98–110)
CO2 SERPL-SCNC: 26 MMOL/L — SIGNIFICANT CHANGE UP (ref 17–32)
CREAT SERPL-MCNC: 0.6 MG/DL — LOW (ref 0.7–1.5)
EGFR: 104 ML/MIN/1.73M2 — SIGNIFICANT CHANGE UP
EOSINOPHIL # BLD AUTO: 0.15 K/UL — SIGNIFICANT CHANGE UP (ref 0–0.7)
EOSINOPHIL NFR BLD AUTO: 2.3 % — SIGNIFICANT CHANGE UP (ref 0–8)
GLUCOSE BLDC GLUCOMTR-MCNC: 186 MG/DL — HIGH (ref 70–99)
GLUCOSE BLDC GLUCOMTR-MCNC: 227 MG/DL — HIGH (ref 70–99)
GLUCOSE SERPL-MCNC: 184 MG/DL — HIGH (ref 70–99)
HCT VFR BLD CALC: 44.5 % — SIGNIFICANT CHANGE UP (ref 42–52)
HGB BLD-MCNC: 15.4 G/DL — SIGNIFICANT CHANGE UP (ref 14–18)
IMM GRANULOCYTES NFR BLD AUTO: 0.5 % — HIGH (ref 0.1–0.3)
LYMPHOCYTES # BLD AUTO: 1.2 K/UL — SIGNIFICANT CHANGE UP (ref 1.2–3.4)
LYMPHOCYTES # BLD AUTO: 18 % — LOW (ref 20.5–51.1)
MAGNESIUM SERPL-MCNC: 1.7 MG/DL — LOW (ref 1.8–2.4)
MCHC RBC-ENTMCNC: 30.5 PG — SIGNIFICANT CHANGE UP (ref 27–31)
MCHC RBC-ENTMCNC: 34.6 G/DL — SIGNIFICANT CHANGE UP (ref 32–37)
MCV RBC AUTO: 88.1 FL — SIGNIFICANT CHANGE UP (ref 80–94)
MONOCYTES # BLD AUTO: 0.76 K/UL — HIGH (ref 0.1–0.6)
MONOCYTES NFR BLD AUTO: 11.4 % — HIGH (ref 1.7–9.3)
NEUTROPHILS # BLD AUTO: 4.44 K/UL — SIGNIFICANT CHANGE UP (ref 1.4–6.5)
NEUTROPHILS NFR BLD AUTO: 66.6 % — SIGNIFICANT CHANGE UP (ref 42.2–75.2)
NRBC # BLD: 0 /100 WBCS — SIGNIFICANT CHANGE UP (ref 0–0)
PLATELET # BLD AUTO: 323 K/UL — SIGNIFICANT CHANGE UP (ref 130–400)
PMV BLD: 10.2 FL — SIGNIFICANT CHANGE UP (ref 7.4–10.4)
POTASSIUM SERPL-MCNC: 4.8 MMOL/L — SIGNIFICANT CHANGE UP (ref 3.5–5)
POTASSIUM SERPL-SCNC: 4.8 MMOL/L — SIGNIFICANT CHANGE UP (ref 3.5–5)
RBC # BLD: 5.05 M/UL — SIGNIFICANT CHANGE UP (ref 4.7–6.1)
RBC # FLD: 12.1 % — SIGNIFICANT CHANGE UP (ref 11.5–14.5)
SODIUM SERPL-SCNC: 129 MMOL/L — LOW (ref 135–146)
WBC # BLD: 6.66 K/UL — SIGNIFICANT CHANGE UP (ref 4.8–10.8)
WBC # FLD AUTO: 6.66 K/UL — SIGNIFICANT CHANGE UP (ref 4.8–10.8)

## 2023-07-06 PROCEDURE — 99238 HOSP IP/OBS DSCHRG MGMT 30/<: CPT

## 2023-07-06 RX ORDER — ASPIRIN/CALCIUM CARB/MAGNESIUM 324 MG
1 TABLET ORAL
Qty: 30 | Refills: 3
Start: 2023-07-06 | End: 2023-11-02

## 2023-07-06 RX ORDER — SODIUM CHLORIDE 9 MG/ML
1000 INJECTION INTRAMUSCULAR; INTRAVENOUS; SUBCUTANEOUS
Refills: 0 | Status: DISCONTINUED | OUTPATIENT
Start: 2023-07-06 | End: 2023-07-06

## 2023-07-06 RX ADMIN — Medication 1: at 08:28

## 2023-07-06 RX ADMIN — LISINOPRIL 40 MILLIGRAM(S): 2.5 TABLET ORAL at 06:08

## 2023-07-06 RX ADMIN — HALOPERIDOL DECANOATE 1 MILLIGRAM(S): 100 INJECTION INTRAMUSCULAR at 11:44

## 2023-07-06 RX ADMIN — Medication 2: at 11:44

## 2023-07-06 RX ADMIN — Medication 3 MILLILITER(S): at 09:10

## 2023-07-06 RX ADMIN — Medication 81 MILLIGRAM(S): at 11:44

## 2023-07-06 NOTE — PROGRESS NOTE ADULT - SUBJECTIVE AND OBJECTIVE BOX
ARLEN CALDERON  67y  Male      Patient is a 67y old  Male who presents with a chief complaint of AMS     INTERVAL HPI/OVERNIGHT EVENTS:      ******************************* REVIEW OF SYSTEMS:**********************************************    All other review of systems negative    *********************** VITALS ******************************************    T(F): 98.3 (07-06-23 @ 04:45)  HR: 85 (07-06-23 @ 04:45) (85 - 98)  BP: 147/80 (07-06-23 @ 04:45) (114/66 - 147/80)  RR: 19 (07-06-23 @ 04:45) (18 - 20)  SpO2: 96% (07-06-23 @ 04:45) (96% - 100%)            ******************************** PHYSICAL EXAM:**************************************************  GENERAL: NAD    PSYCH: no agitation, baseline mentation  HEENT:     NERVOUS SYSTEM:  Alert & Oriented X3,     PULMONARY: BIRD, CTA    CARDIOVASCULAR: S1S2 RRR    GI: Soft, NT, ND; BS present.    EXTREMITIES:  2+ Peripheral Pulses, No clubbing, cyanosis, or edema    LYMPH: No lymphadenopathy noted    SKIN: No rashes or lesions      **************************** LABS *******************************************************                          15.4   6.66  )-----------( 323      ( 06 Jul 2023 07:49 )             44.5     07-06    129<L>  |  92<L>  |  13  ----------------------------<  184<H>  4.8   |  26  |  0.6<L>    Ca    9.9      06 Jul 2023 07:49  Mg     1.7     07-06        Urinalysis Basic - ( 06 Jul 2023 07:49 )    Color: x / Appearance: x / SG: x / pH: x  Gluc: 184 mg/dL / Ketone: x  / Bili: x / Urobili: x   Blood: x / Protein: x / Nitrite: x   Leuk Esterase: x / RBC: x / WBC x   Sq Epi: x / Non Sq Epi: x / Bacteria: x        Lactate Trend        CAPILLARY BLOOD GLUCOSE      POCT Blood Glucose.: 227 mg/dL (06 Jul 2023 10:57)          **************************Active Medications *******************************************  No Known Allergies      acetaminophen     Tablet .. 650 milliGRAM(s) Oral every 6 hours PRN  albuterol/ipratropium for Nebulization 3 milliLiter(s) Nebulizer every 6 hours  aspirin enteric coated 81 milliGRAM(s) Oral daily  atorvastatin 10 milliGRAM(s) Oral at bedtime  dextrose 5%. 1000 milliLiter(s) IV Continuous <Continuous>  dextrose 5%. 1000 milliLiter(s) IV Continuous <Continuous>  dextrose 50% Injectable 12.5 Gram(s) IV Push once  dextrose 50% Injectable 25 Gram(s) IV Push once  dextrose 50% Injectable 25 Gram(s) IV Push once  dextrose Oral Gel 15 Gram(s) Oral once PRN  glucagon  Injectable 1 milliGRAM(s) IntraMuscular once  haloperidol     Tablet 1 milliGRAM(s) Oral daily  insulin lispro (ADMELOG) corrective regimen sliding scale   SubCutaneous three times a day before meals  lisinopril 40 milliGRAM(s) Oral daily  melatonin 5 milliGRAM(s) Oral at bedtime PRN  nicotine - 21 mG/24Hr(s) Patch 1 Patch Transdermal daily  sodium chloride 0.9%. 1000 milliLiter(s) IV Continuous <Continuous>      ***************************************************  RADIOLOGY & ADDITIONAL TESTS:    Imaging Personally Reviewed:  [ ] YES  [ ] NO    HEALTH ISSUES - PROBLEM Dx:

## 2023-07-06 NOTE — PROGRESS NOTE ADULT - ASSESSMENT
67y Male with a PMH of DM and HTN presents for Altered mental status of 1 day duration. history goes back to 1 week ago were the patient was note to be altered and hypoglycemia in the [facility] home. As per the nursing home the patient is usually not oriented, but awake and is able to walk. on the day of admission the patient was note to have a seizure like episode, he became unresponsive after using the bathroom, L pupil > R pupil, /89. stroke code was called in ER  He was assisted to the bed when he became unresponsive to verbal stimulation. Bedside . Pt NIHSS was 23 stroke team assessment however improved to 3 for incorrect ans and RUE drift. seizure like event w/ RENEE.  Neuro recs appreciated: rEEG, repeat routine labs, start Thiamine therapy, start Aspirin 81mg ECAD  # AMS, likely due to hypoglycemia, pt diabetic on insulin ( not Lantus on hold)  - EEG: < from: EEG (07.03.23 @ 13:00) >No electrographic seizures or significant clinical events.  - cont thiamin and asa  - monitor fs, avoid hypoglycemia   POCT Blood Glucose.: 175 mg/dL (07-04-23 @ 08:26)  POCT Blood Glucose.: 139 mg/dL (07-03-23 @ 23:00)  POCT Blood Glucose.: 229 mg/dL (07-03-23 @ 17:23)  POCT Blood Glucose.: 152 mg/dL (07-03-23 @ 11:47)    # sob, due to copd , not in exacerbation   - cont nebs   - 4 mm left upper lobe pulmonary nodule, consider follow-up chest CT in 12 months  - ABG - ( 04 Jul 2023 09:53 ) pH, Arterial: 7.45  pH, Blood: x     /  pCO2: 41    /  pO2: 84    / HCO3: 28    / Base Excess: 4.1   /  SaO2: 98.5    - cxr no cardiopulm disease  - f/u outpatient    # hypertensive urgency : now controlled,   BP: 168/76 (07-04-23 @ 05:14) (130/70 - 168/76)    # unknown hx of etoh  - pt states he does not drink at all   - cont thiamine supplement     # active tobacco smoker, 1-2 pack a day ,   - counseled,  - did not want nicotine patch    #Progress Note Handoff: discharging today  Pending:   Disposition: Adult home

## 2023-07-06 NOTE — PROGRESS NOTE ADULT - ASSESSMENT
67 year old male with a PMH of DM and HTN presents for Altered mental status of 1 day duration. history goes back to 1 week ago were the patient was note to be altered and hypoglycemia in the [facility] home. As per the nursing home the patient is usually not oriented, but awake and is able to walk. on the day of admission the patient was note to have a seizure like episode, he became unresponsive after using the bathroom, L pupil > R pupil, /89. stroke code was called in ER  He was assisted to the bed when he became unresponsive to verbal stimulation. Bedside . Pt NIHSS was 23 stroke team assessment however improved to 3 for incorrect ans and RUE drift. seizure like event w/ RENEE   neuro notes appreciated:      # AMS, likely metabolic encephalopathy possible due to hypoglycemia,    pt diabetic on insulin ( now Lantus on hold)    Will switch to Metformin for better compliance on dc.   cont thiamin and asa  monitor fs, avoid hypoglycemia   POCT Blood Glucose.: 175 mg/dL (07-04-23 @ 08:26)  POCT Blood Glucose.: 139 mg/dL (07-03-23 @ 23:00)  POCT Blood Glucose.: 229 mg/dL (07-03-23 @ 17:23)    f/u pmd / Endocrine as outpt/     # sob, due to copd , not in exacerbation   cont nebs   outpt fu   ABG - ( 04 Jul 2023 09:53 )  pH, Arterial: 7.45  pH, Blood: x     /  pCO2: 41    /  pO2: 84    / HCO3: 28    / Base Excess: 4.1   /  SaO2: 98.5      cxr is wnl , interpreted independently by me    # Hypertensive urgency : now better controlled,     # unknown hx of etoh, pt states he does not drink at all   cont thiamin supplement,     # active tobacco smoker, 1-2 pack a day , counseled,  agrees with nicotine patch, ordered nicotine 21 mg /d while stay in the hospital     #Progress Note Handoff  DC planning to    Disposition: Adult home

## 2023-07-06 NOTE — PROGRESS NOTE ADULT - SUBJECTIVE AND OBJECTIVE BOX
24H events:    Patient is a 67y old Male who presents with a chief complaint of AMS (04 Jul 2023 13:34)    Primary diagnosis of Hypoglycemia    Today is hospital day 4d. This morning patient was seen and examined at bedside, resting comfortably in bed.    No acute or major events overnight.    Code Status:    Family communication:  Contact date:  Name of person contacted:  Relationship to patient:  Communication details:  What matters most:    PAST MEDICAL & SURGICAL HISTORY  Diabetes    Schizo-affective schizophrenia    No significant past surgical history    No significant past surgical history      SOCIAL HISTORY:  Social History:      ALLERGIES:  No Known Allergies    MEDICATIONS:  STANDING MEDICATIONS  albuterol/ipratropium for Nebulization 3 milliLiter(s) Nebulizer every 6 hours  aspirin enteric coated 81 milliGRAM(s) Oral daily  atorvastatin 10 milliGRAM(s) Oral at bedtime  dextrose 5%. 1000 milliLiter(s) IV Continuous <Continuous>  dextrose 5%. 1000 milliLiter(s) IV Continuous <Continuous>  dextrose 50% Injectable 12.5 Gram(s) IV Push once  dextrose 50% Injectable 25 Gram(s) IV Push once  dextrose 50% Injectable 25 Gram(s) IV Push once  glucagon  Injectable 1 milliGRAM(s) IntraMuscular once  haloperidol     Tablet 1 milliGRAM(s) Oral daily  insulin lispro (ADMELOG) corrective regimen sliding scale   SubCutaneous three times a day before meals  lisinopril 40 milliGRAM(s) Oral daily  nicotine - 21 mG/24Hr(s) Patch 1 Patch Transdermal daily  sodium chloride 0.9%. 1000 milliLiter(s) IV Continuous <Continuous>    PRN MEDICATIONS  acetaminophen     Tablet .. 650 milliGRAM(s) Oral every 6 hours PRN  dextrose Oral Gel 15 Gram(s) Oral once PRN  melatonin 5 milliGRAM(s) Oral at bedtime PRN    VITALS:   T(F): 98.3  HR: 85  BP: 147/80  RR: 19  SpO2: 96%    PHYSICAL EXAM:  GENERAL:   ( + ) NAD, lying in bed comfortably     (  ) obtunded     (  ) lethargic     (  ) somnolent    HEAD:   ( + ) Atraumatic     (  ) hematoma     (  ) laceration (specify location:       )     NECK:  ( + ) Supple     (  ) neck stiffness     (  ) nuchal rigidity     (  )  no JVD     (  ) JVD present ( -- cm)    HEART:  Rate -->     (  ) normal rate     (  ) bradycardic     (  ) tachycardic  Rhythm -->     (  ) regular     (  ) regularly irregular     (  ) irregularly irregular  Murmurs -->     (  ) normal s1s2     (  ) systolic murmur     (  ) diastolic murmur     (  ) continuous murmur      (  ) S3 present     (  ) S4 present    LUNGS:   ( + )Unlabored respirations     (  ) tachypnea  (  ) B/L air entry     (  ) decreased breath sounds in:  (location     )    (  ) no adventitious sound     (  ) crackles     (  ) wheezing      (  ) rhonchi      (specify location:       )  (  ) chest wall tenderness (specify location:       )    ABDOMEN:   (  ) Soft     (  ) tense   |   (  ) nondistended     (  ) distended   |   (  ) +BS     (  ) hypoactive bowel sounds     (  ) hyperactive bowel sounds  (  ) nontender     (  ) RUQ tenderness     (  ) RLQ tenderness     (  ) LLQ tenderness     (  ) epigastric tenderness     (  ) diffuse tenderness  (  ) Splenomegaly      (  ) Hepatomegaly      (  ) Jaundice     (  ) ecchymosis     EXTREMITIES: 2+ peripheral pulses bilaterally. No clubbing, cyanosis, or edema  ( + ) Normal     (  ) Rash     (  ) ecchymosis     (  ) varicose veins      (  ) pitting edema     (  ) non-pitting edema   (  ) ulceration     (  ) gangrene:     (location:     )    NERVOUS SYSTEM:    (  ) A&Ox3     (  ) confused     (  ) lethargic  CN II-XII:     ( + ) Intact     (  ) deficits found     (Specify:     )   Upper extremities:     (  ) no sensorimotor deficits     (  ) weakness     (  ) loss of proprioception/vibration     (  ) loss of touch/temperature (specify:    )  Lower extremities:     (  ) no sensorimotor deficits     (  ) weakness     (  ) loss of proprioception/vibration     (  ) loss of touch/temperature (specify:    )    SKIN:   (  ) No rashes or lesions     (  ) maculopapular rash     (  ) pustules     (  ) vesicles     (  ) ulcer     (  ) ecchymosis     (specify location:     )    AMPAC score:    (  ) Indwelling Andre Catheter:   Date insterted:    Reason (  ) Critical illness     (  ) urinary retention    (  ) Accurate Ins/Outs Monitoring     (  ) CMO patient    (  ) Central Line:   Date inserted:  Location: (  ) Right IJ     (  ) Left IJ     (  ) Right Fem     (  ) Left Fem    (  ) SPC        (  ) pigtail       (  ) PEG tube       (  ) colostomy       (  ) jejunostomy  (  ) U-Dall    LABS:                        15.4   6.66  )-----------( 323      ( 06 Jul 2023 07:49 )             44.5     07-06    129<L>  |  92<L>  |  13  ----------------------------<  184<H>  4.8   |  26  |  0.6<L>    Ca    9.9      06 Jul 2023 07:49  Mg     1.7     07-06        Urinalysis Basic - ( 06 Jul 2023 07:49 )    Color: x / Appearance: x / SG: x / pH: x  Gluc: 184 mg/dL / Ketone: x  / Bili: x / Urobili: x   Blood: x / Protein: x / Nitrite: x   Leuk Esterase: x / RBC: x / WBC x   Sq Epi: x / Non Sq Epi: x / Bacteria: x                RADIOLOGY:

## 2023-07-11 DIAGNOSIS — G93.41 METABOLIC ENCEPHALOPATHY: ICD-10-CM

## 2023-07-11 DIAGNOSIS — E87.1 HYPO-OSMOLALITY AND HYPONATREMIA: ICD-10-CM

## 2023-07-11 DIAGNOSIS — R91.1 SOLITARY PULMONARY NODULE: ICD-10-CM

## 2023-07-11 DIAGNOSIS — I10 ESSENTIAL (PRIMARY) HYPERTENSION: ICD-10-CM

## 2023-07-11 DIAGNOSIS — J44.9 CHRONIC OBSTRUCTIVE PULMONARY DISEASE, UNSPECIFIED: ICD-10-CM

## 2023-07-11 DIAGNOSIS — E11.649 TYPE 2 DIABETES MELLITUS WITH HYPOGLYCEMIA WITHOUT COMA: ICD-10-CM

## 2023-07-11 DIAGNOSIS — I16.0 HYPERTENSIVE URGENCY: ICD-10-CM

## 2023-07-11 DIAGNOSIS — F25.9 SCHIZOAFFECTIVE DISORDER, UNSPECIFIED: ICD-10-CM

## 2023-07-11 DIAGNOSIS — E78.5 HYPERLIPIDEMIA, UNSPECIFIED: ICD-10-CM

## 2023-07-11 DIAGNOSIS — F17.200 NICOTINE DEPENDENCE, UNSPECIFIED, UNCOMPLICATED: ICD-10-CM

## 2023-07-11 DIAGNOSIS — R64 CACHEXIA: ICD-10-CM

## 2023-07-11 DIAGNOSIS — Z79.4 LONG TERM (CURRENT) USE OF INSULIN: ICD-10-CM

## 2023-07-23 NOTE — ED ADULT NURSE NOTE - NSSISCREENINGQ4_ED_A_ED
Pt care assumed. Report received by ALIVIA Arora. Pt lying in stretcher in low and locked position and side rails raised x2. Call light, pt's belongings, and bedside table within pt's reach. Pt in NAD and verbalized no needs at this time.        No

## 2023-08-07 ENCOUNTER — EMERGENCY (EMERGENCY)
Facility: HOSPITAL | Age: 68
LOS: 0 days | Discharge: HOME | End: 2023-08-08
Attending: EMERGENCY MEDICINE
Payer: MEDICARE

## 2023-08-07 ENCOUNTER — EMERGENCY (EMERGENCY)
Facility: HOSPITAL | Age: 68
LOS: 0 days | Discharge: HOME | End: 2023-08-07
Attending: EMERGENCY MEDICINE
Payer: MEDICARE

## 2023-08-07 VITALS
DIASTOLIC BLOOD PRESSURE: 72 MMHG | RESPIRATION RATE: 17 BRPM | OXYGEN SATURATION: 97 % | SYSTOLIC BLOOD PRESSURE: 107 MMHG | HEART RATE: 97 BPM | WEIGHT: 134.92 LBS | HEIGHT: 71 IN | TEMPERATURE: 99 F

## 2023-08-07 VITALS
SYSTOLIC BLOOD PRESSURE: 150 MMHG | TEMPERATURE: 98 F | HEIGHT: 71 IN | DIASTOLIC BLOOD PRESSURE: 74 MMHG | OXYGEN SATURATION: 99 % | RESPIRATION RATE: 18 BRPM | HEART RATE: 77 BPM

## 2023-08-07 VITALS
SYSTOLIC BLOOD PRESSURE: 144 MMHG | HEART RATE: 72 BPM | DIASTOLIC BLOOD PRESSURE: 74 MMHG | TEMPERATURE: 98 F | OXYGEN SATURATION: 99 % | RESPIRATION RATE: 18 BRPM

## 2023-08-07 DIAGNOSIS — Z79.82 LONG TERM (CURRENT) USE OF ASPIRIN: ICD-10-CM

## 2023-08-07 DIAGNOSIS — Z79.84 LONG TERM (CURRENT) USE OF ORAL HYPOGLYCEMIC DRUGS: ICD-10-CM

## 2023-08-07 DIAGNOSIS — I10 ESSENTIAL (PRIMARY) HYPERTENSION: ICD-10-CM

## 2023-08-07 DIAGNOSIS — E11.9 TYPE 2 DIABETES MELLITUS WITHOUT COMPLICATIONS: ICD-10-CM

## 2023-08-07 DIAGNOSIS — R25.1 TREMOR, UNSPECIFIED: ICD-10-CM

## 2023-08-07 DIAGNOSIS — Z87.891 PERSONAL HISTORY OF NICOTINE DEPENDENCE: ICD-10-CM

## 2023-08-07 DIAGNOSIS — F25.9 SCHIZOAFFECTIVE DISORDER, UNSPECIFIED: ICD-10-CM
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
## 2023-08-07 LAB
ALBUMIN SERPL ELPH-MCNC: 4.4 G/DL — SIGNIFICANT CHANGE UP (ref 3.5–5.2)
ALP SERPL-CCNC: 107 U/L — SIGNIFICANT CHANGE UP (ref 30–115)
ALT FLD-CCNC: 11 U/L — SIGNIFICANT CHANGE UP (ref 0–41)
ANION GAP SERPL CALC-SCNC: 11 MMOL/L — SIGNIFICANT CHANGE UP (ref 7–14)
AST SERPL-CCNC: 19 U/L — SIGNIFICANT CHANGE UP (ref 0–41)
BASOPHILS # BLD AUTO: 0.05 K/UL — SIGNIFICANT CHANGE UP (ref 0–0.2)
BASOPHILS NFR BLD AUTO: 0.8 % — SIGNIFICANT CHANGE UP (ref 0–1)
BILIRUB SERPL-MCNC: 0.3 MG/DL — SIGNIFICANT CHANGE UP (ref 0.2–1.2)
BUN SERPL-MCNC: 13 MG/DL — SIGNIFICANT CHANGE UP (ref 10–20)
CALCIUM SERPL-MCNC: 9.5 MG/DL — SIGNIFICANT CHANGE UP (ref 8.4–10.5)
CHLORIDE SERPL-SCNC: 96 MMOL/L — LOW (ref 98–110)
CO2 SERPL-SCNC: 29 MMOL/L — SIGNIFICANT CHANGE UP (ref 17–32)
CREAT SERPL-MCNC: 0.6 MG/DL — LOW (ref 0.7–1.5)
EGFR: 104 ML/MIN/1.73M2 — SIGNIFICANT CHANGE UP
EOSINOPHIL # BLD AUTO: 0.02 K/UL — SIGNIFICANT CHANGE UP (ref 0–0.7)
EOSINOPHIL NFR BLD AUTO: 0.3 % — SIGNIFICANT CHANGE UP (ref 0–8)
GAS PNL BLDV: SIGNIFICANT CHANGE UP
GLUCOSE SERPL-MCNC: 190 MG/DL — HIGH (ref 70–99)
HCT VFR BLD CALC: 40 % — LOW (ref 42–52)
HGB BLD-MCNC: 13.3 G/DL — LOW (ref 14–18)
IMM GRANULOCYTES NFR BLD AUTO: 0.3 % — SIGNIFICANT CHANGE UP (ref 0.1–0.3)
LACTATE SERPL-SCNC: 2.6 MMOL/L — HIGH (ref 0.7–2)
LYMPHOCYTES # BLD AUTO: 0.68 K/UL — LOW (ref 1.2–3.4)
LYMPHOCYTES # BLD AUTO: 10.2 % — LOW (ref 20.5–51.1)
MCHC RBC-ENTMCNC: 29.8 PG — SIGNIFICANT CHANGE UP (ref 27–31)
MCHC RBC-ENTMCNC: 33.3 G/DL — SIGNIFICANT CHANGE UP (ref 32–37)
MCV RBC AUTO: 89.7 FL — SIGNIFICANT CHANGE UP (ref 80–94)
MONOCYTES # BLD AUTO: 0.5 K/UL — SIGNIFICANT CHANGE UP (ref 0.1–0.6)
MONOCYTES NFR BLD AUTO: 7.5 % — SIGNIFICANT CHANGE UP (ref 1.7–9.3)
NEUTROPHILS # BLD AUTO: 5.38 K/UL — SIGNIFICANT CHANGE UP (ref 1.4–6.5)
NEUTROPHILS NFR BLD AUTO: 80.9 % — HIGH (ref 42.2–75.2)
NRBC # BLD: 0 /100 WBCS — SIGNIFICANT CHANGE UP (ref 0–0)
PLATELET # BLD AUTO: 299 K/UL — SIGNIFICANT CHANGE UP (ref 130–400)
PMV BLD: 11.3 FL — HIGH (ref 7.4–10.4)
POTASSIUM SERPL-MCNC: 4 MMOL/L — SIGNIFICANT CHANGE UP (ref 3.5–5)
POTASSIUM SERPL-SCNC: 4 MMOL/L — SIGNIFICANT CHANGE UP (ref 3.5–5)
PROT SERPL-MCNC: 6.5 G/DL — SIGNIFICANT CHANGE UP (ref 6–8)
RBC # BLD: 4.46 M/UL — LOW (ref 4.7–6.1)
RBC # FLD: 12.6 % — SIGNIFICANT CHANGE UP (ref 11.5–14.5)
SODIUM SERPL-SCNC: 136 MMOL/L — SIGNIFICANT CHANGE UP (ref 135–146)
TROPONIN T SERPL-MCNC: <0.01 NG/ML — SIGNIFICANT CHANGE UP
WBC # BLD: 6.65 K/UL — SIGNIFICANT CHANGE UP (ref 4.8–10.8)
WBC # FLD AUTO: 6.65 K/UL — SIGNIFICANT CHANGE UP (ref 4.8–10.8)

## 2023-08-07 PROCEDURE — 85014 HEMATOCRIT: CPT

## 2023-08-07 PROCEDURE — 70450 CT HEAD/BRAIN W/O DYE: CPT | Mod: MA

## 2023-08-07 PROCEDURE — 70450 CT HEAD/BRAIN W/O DYE: CPT | Mod: 26,MA

## 2023-08-07 PROCEDURE — 80053 COMPREHEN METABOLIC PANEL: CPT

## 2023-08-07 PROCEDURE — 82962 GLUCOSE BLOOD TEST: CPT

## 2023-08-07 PROCEDURE — 84295 ASSAY OF SERUM SODIUM: CPT

## 2023-08-07 PROCEDURE — 99285 EMERGENCY DEPT VISIT HI MDM: CPT

## 2023-08-07 PROCEDURE — 99285 EMERGENCY DEPT VISIT HI MDM: CPT | Mod: 25

## 2023-08-07 PROCEDURE — 85018 HEMOGLOBIN: CPT

## 2023-08-07 PROCEDURE — 83605 ASSAY OF LACTIC ACID: CPT

## 2023-08-07 PROCEDURE — L9995: CPT

## 2023-08-07 PROCEDURE — 93005 ELECTROCARDIOGRAM TRACING: CPT

## 2023-08-07 PROCEDURE — 84484 ASSAY OF TROPONIN QUANT: CPT

## 2023-08-07 PROCEDURE — 93010 ELECTROCARDIOGRAM REPORT: CPT

## 2023-08-07 PROCEDURE — 36415 COLL VENOUS BLD VENIPUNCTURE: CPT

## 2023-08-07 PROCEDURE — 84132 ASSAY OF SERUM POTASSIUM: CPT

## 2023-08-07 PROCEDURE — 99282 EMERGENCY DEPT VISIT SF MDM: CPT

## 2023-08-07 PROCEDURE — 82330 ASSAY OF CALCIUM: CPT

## 2023-08-07 PROCEDURE — 82803 BLOOD GASES ANY COMBINATION: CPT

## 2023-08-07 PROCEDURE — 71045 X-RAY EXAM CHEST 1 VIEW: CPT | Mod: 26

## 2023-08-07 PROCEDURE — 71045 X-RAY EXAM CHEST 1 VIEW: CPT

## 2023-08-07 PROCEDURE — 85025 COMPLETE CBC W/AUTO DIFF WBC: CPT

## 2023-08-07 RX ORDER — SODIUM CHLORIDE 9 MG/ML
1000 INJECTION, SOLUTION INTRAVENOUS ONCE
Refills: 0 | Status: COMPLETED | OUTPATIENT
Start: 2023-08-07 | End: 2023-08-07

## 2023-08-07 RX ADMIN — SODIUM CHLORIDE 2000 MILLILITER(S): 9 INJECTION, SOLUTION INTRAVENOUS at 12:21

## 2023-08-07 NOTE — ED PROVIDER NOTE - OBJECTIVE STATEMENT
68-year-old male with history of hypertension, diabetes presents to ED via EMS from [facility] with complaints of a possible seizure that occurred today at 9 AM.  Per [facility], patient was found to have generalized shaking for a few minutes, states that after this time patient was awake but not talking.  Patient was taken up by EMS shortly after.  [facility] deny any history of seizures, states patient was seen normal just prior to this incident.  In ED, patient reports that he was in [employer] this morning when he fell, states that he felt like he passed out but is unsure.  [facility] states that patient was at the home for the entire morning and did not go out.  Patient denies any current pain, states he has been eating and drinking well.  Patient was recently admitted to this hospital in July 2023 for altered mental status and hypoglycemia.  Stroke code was called, but imaging was normal, patient also had a negative EEG.  There has been no recent change to patient's medications.  Per [facility], patient has not had any coughing or URI symptoms, states that patient has been at his baseline.  Patient's baseline is awake and oriented only to person, ambulatory with assistance with cane. Further history limited secondary to patient's baseline disorientation.

## 2023-08-07 NOTE — ED ADULT TRIAGE NOTE - CHIEF COMPLAINT QUOTE
JENNY from [facility] s/p seizure x 1 episode witnessed by staff @ the facility. No loss of bladder function. Arrived in ER awake, alert & conversant. Triage JN=648. H/O seizures.

## 2023-08-07 NOTE — ED ADULT NURSE NOTE - CHIEF COMPLAINT QUOTE
JENNY from [facility] s/p seizure x 1 episode witnessed by staff @ the facility. No loss of bladder function. Arrived in ER awake, alert & conversant. Triage NS=303. H/O seizures.

## 2023-08-07 NOTE — ED PROVIDER NOTE - PHYSICAL EXAMINATION
Gen: NAD, AOx3  Head: NCAT  HEENT: PERRL, oral mucosa moist, normal conjunctiva, oropharynx clear without exudate or erythema  Lung: CTAB, no respiratory distress, no wheezing, rales, rhonchi  CV: normal s1/s2, rrr, Normal perfusion, pulses 2+ throughout  Abd: soft, NTND, no CVA tenderness  Genitourinary: no pelvic tenderness  MSK: No edema, no visible deformities, full range of motion in all 4 extremities  Neuro: CN II-XII grossly intact, No focal neurologic deficits, no meningeal signs, no nystagmus/pronator drift, coordination/sensation intact, strength 5/5 BUE/BLE  Skin: No rash   Psych: normal affect

## 2023-08-07 NOTE — ED PROVIDER NOTE - PATIENT PORTAL LINK FT
You can access the FollowMyHealth Patient Portal offered by [facility] by registering at the following website: [email or web address]. By joining [facility]’s FollowMyHealth portal, you will also be able to view your health information using other applications (apps) compatible with our system.

## 2023-08-07 NOTE — ED PROVIDER NOTE - PHYSICAL EXAMINATION
CONSTITUTIONAL: Awake  SKIN: Warm, dry  HEAD: Normocephalic; atraumatic  EYES: Left pupil > Right pupil, but reactive  ENT: No nasal discharge; airway clear.  NECK: Supple; non tender.  CARD:  Regular rate and rhythm. Normal S1, S2  RESP: No increased WOB. CTA b/l without wheezes, crackles, rhonchi  ABD: Normoactive BS. Soft, nontender, nondistended.  EXT: Normal ROM.   LYMPH: No acute cervical adenopathy.  NEURO: oriented to person, but not to place or time. Patient able to follow commands at times, but drifts off.  PSYCH: Cooperative at times.     Exam limited secondary to patient's baseline disorientation.

## 2023-08-07 NOTE — ED PROVIDER NOTE - CLINICAL SUMMARY MEDICAL DECISION MAKING FREE TEXT BOX
68-year-old man, history of hypertension, diabetes presents from [facility] after possible seizure   Versus syncope.  Patient is a poor historian 2/2?  Dementia.  Recent extensive work-up for similar episode was unremarkable.  Vital signs, exam as noted, patient is poorly kempt,  appears chronically deconditioned, but not acutely toxic.  NCAT, lungs clear, CV S1-S2, abdomen soft, nontender, no focal neurodeficits.  ED work-up was unremarkable.  Given recent work-up, patient discharged back to [facility].

## 2023-08-07 NOTE — ED ADULT TRIAGE NOTE - CHIEF COMPLAINT QUOTE
Pt presents to the ED BIBEMS from NH. Pt was discharged recently w/ mental status A&Ox2 and upon arrival back at the nursing home pt was A&Ox0 and had a witnessed seizure like activity and was unresponsive to verbal stimuli for sheela 5 minutes. Pt PMHx of seizure. FS 72 in triage.

## 2023-08-07 NOTE — ED ADULT NURSE NOTE - NSFALLHARMRISKINTERV_ED_ALL_ED
Assistance OOB with selected safe patient handling equipment if applicable/Communicate risk of Fall with Harm to all staff, patient, and family/Monitor gait and stability/Provide patient with walking aids/Provide visual cue: red socks, yellow wristband, yellow gown, etc/Reinforce activity limits and safety measures with patient and family/Bed in lowest position, wheels locked, appropriate side rails in place/Call bell, personal items and telephone in reach/Instruct patient to call for assistance before getting out of bed/chair/stretcher/Non-slip footwear applied when patient is off stretcher/[place] to call system/Physically safe environment - no spills, clutter or unnecessary equipment/Purposeful Proactive Rounding/Room/bathroom lighting operational, light cord in reach

## 2023-08-07 NOTE — ED PROVIDER NOTE - PROGRESS NOTE DETAILS
CELINA: spoke with [facility]'s residence for collateral information. Report that at 9am this morning patient appeared to be shaking generally for a few minutes, was awake after the incident but was not speaking, did not have urinary incontinence. Prior to this incident, patient was seen normal by staff. Patient's baseline is awake, oriented only to person, able to speak, and ambulatory with use of cane. Patient does not have history of seizures. No medication changes. CELINA: Patient re-evaluated. Appeared to be shaking, notably to bilateral upper extremities. During this time patient was intermittently conversive with me, oriented to person, but not to place or time. Fingerstick 123. CELINA: patient re-evaluated. Patient oriented to person. Able to get out of bed and walk, with assistance of his cane.

## 2023-08-07 NOTE — ED ADULT TRIAGE NOTE - BEFAST SPEECH SLURRED
GI consulted (Anton)  EGD done 9/17 - showed gastric ulcer; Bx benign  continue with Protonix BID  Colonoscopy done 9/25 - showed ticosis and rectal ulcer  Bx showed no neoplasm  colitis seen on CT scan  added Flagyl No

## 2023-08-07 NOTE — ED PROVIDER NOTE - OBJECTIVE STATEMENT
69 yo male with a pmh of htn and dm sent in from [facility] for a possible seizure. pt was seen in the ED earlier today for the same complaint and found to have a unremarkable workup. pt states when ems was bring pt to the facility he experienced a brief episode of shaking to his hands with no unconsciousness or incontinence. pt states he recalls the episode and the staff stated he had to go back to the hospital. pt denies any other symptoms including fevers, chill, headache, recent illness/travel, cough, abdominal pain, chest pain, or SOB.

## 2023-08-07 NOTE — ED ADULT NURSE NOTE - OBJECTIVE STATEMENT
JENNY from [facility] s/p seizure x 1 episode witnessed by staff @ the facility. No loss of bladder function. Arrived in ER awake, alert & conversant. Triage VO=203. H/O seizures.

## 2023-08-07 NOTE — ED ADULT NURSE NOTE - NSFALLHARMRISKINTERV_ED_ALL_ED

## 2023-08-07 NOTE — ED PROVIDER NOTE - NS ED ATTENDING STATEMENT MOD
This was a shared visit with the JEANA. I reviewed and verified the documentation and independently performed the documented:

## 2023-08-07 NOTE — ED PROVIDER NOTE - PATIENT PORTAL LINK FT
You can access the FollowMyHealth Patient Portal offered by [facility] by registering at the following website: [email or web address]. By joining [employer]’s FollowMyHealth portal, you will also be able to view your health information using other applications (apps) compatible with our system.

## 2023-08-08 ENCOUNTER — INPATIENT (INPATIENT)
Facility: HOSPITAL | Age: 68
LOS: 6 days | Discharge: HOME | DRG: 640 | End: 2023-08-15
Attending: HOSPITALIST | Admitting: INTERNAL MEDICINE
Payer: MEDICARE

## 2023-08-08 VITALS
SYSTOLIC BLOOD PRESSURE: 130 MMHG | OXYGEN SATURATION: 99 % | WEIGHT: 149.91 LBS | HEART RATE: 92 BPM | DIASTOLIC BLOOD PRESSURE: 72 MMHG | TEMPERATURE: 98 F | HEIGHT: 71 IN | RESPIRATION RATE: 18 BRPM

## 2023-08-08 DIAGNOSIS — R41.82 ALTERED MENTAL STATUS, UNSPECIFIED: ICD-10-CM

## 2023-08-08 LAB
ALBUMIN SERPL ELPH-MCNC: 4.1 G/DL — SIGNIFICANT CHANGE UP (ref 3.5–5.2)
ALP SERPL-CCNC: 106 U/L — SIGNIFICANT CHANGE UP (ref 30–115)
ALT FLD-CCNC: 10 U/L — SIGNIFICANT CHANGE UP (ref 0–41)
ANION GAP SERPL CALC-SCNC: 9 MMOL/L — SIGNIFICANT CHANGE UP (ref 7–14)
AST SERPL-CCNC: 14 U/L — SIGNIFICANT CHANGE UP (ref 0–41)
BASOPHILS # BLD AUTO: 0.05 K/UL — SIGNIFICANT CHANGE UP (ref 0–0.2)
BASOPHILS NFR BLD AUTO: 0.7 % — SIGNIFICANT CHANGE UP (ref 0–1)
BILIRUB SERPL-MCNC: 0.4 MG/DL — SIGNIFICANT CHANGE UP (ref 0.2–1.2)
BUN SERPL-MCNC: 8 MG/DL — LOW (ref 10–20)
CALCIUM SERPL-MCNC: 9.2 MG/DL — SIGNIFICANT CHANGE UP (ref 8.4–10.5)
CHLORIDE SERPL-SCNC: 97 MMOL/L — LOW (ref 98–110)
CO2 SERPL-SCNC: 29 MMOL/L — SIGNIFICANT CHANGE UP (ref 17–32)
CREAT SERPL-MCNC: 0.5 MG/DL — LOW (ref 0.7–1.5)
EGFR: 110 ML/MIN/1.73M2 — SIGNIFICANT CHANGE UP
EOSINOPHIL # BLD AUTO: 0.14 K/UL — SIGNIFICANT CHANGE UP (ref 0–0.7)
EOSINOPHIL NFR BLD AUTO: 1.9 % — SIGNIFICANT CHANGE UP (ref 0–8)
ETHANOL SERPL-MCNC: <10 MG/DL — SIGNIFICANT CHANGE UP
GLUCOSE BLDC GLUCOMTR-MCNC: 168 MG/DL — HIGH (ref 70–99)
GLUCOSE SERPL-MCNC: 173 MG/DL — HIGH (ref 70–99)
HCT VFR BLD CALC: 38.6 % — LOW (ref 42–52)
HGB BLD-MCNC: 13.1 G/DL — LOW (ref 14–18)
IMM GRANULOCYTES NFR BLD AUTO: 0.3 % — SIGNIFICANT CHANGE UP (ref 0.1–0.3)
LACTATE SERPL-SCNC: 1.2 MMOL/L — SIGNIFICANT CHANGE UP (ref 0.7–2)
LYMPHOCYTES # BLD AUTO: 1.14 K/UL — LOW (ref 1.2–3.4)
LYMPHOCYTES # BLD AUTO: 15.2 % — LOW (ref 20.5–51.1)
MAGNESIUM SERPL-MCNC: 1.5 MG/DL — LOW (ref 1.8–2.4)
MCHC RBC-ENTMCNC: 30.7 PG — SIGNIFICANT CHANGE UP (ref 27–31)
MCHC RBC-ENTMCNC: 33.9 G/DL — SIGNIFICANT CHANGE UP (ref 32–37)
MCV RBC AUTO: 90.4 FL — SIGNIFICANT CHANGE UP (ref 80–94)
MONOCYTES # BLD AUTO: 0.71 K/UL — HIGH (ref 0.1–0.6)
MONOCYTES NFR BLD AUTO: 9.4 % — HIGH (ref 1.7–9.3)
NEUTROPHILS # BLD AUTO: 5.46 K/UL — SIGNIFICANT CHANGE UP (ref 1.4–6.5)
NEUTROPHILS NFR BLD AUTO: 72.5 % — SIGNIFICANT CHANGE UP (ref 42.2–75.2)
NRBC # BLD: 0 /100 WBCS — SIGNIFICANT CHANGE UP (ref 0–0)
PLATELET # BLD AUTO: 284 K/UL — SIGNIFICANT CHANGE UP (ref 130–400)
PMV BLD: 10.4 FL — SIGNIFICANT CHANGE UP (ref 7.4–10.4)
POTASSIUM SERPL-MCNC: 4.2 MMOL/L — SIGNIFICANT CHANGE UP (ref 3.5–5)
POTASSIUM SERPL-SCNC: 4.2 MMOL/L — SIGNIFICANT CHANGE UP (ref 3.5–5)
PROT SERPL-MCNC: 6.3 G/DL — SIGNIFICANT CHANGE UP (ref 6–8)
RBC # BLD: 4.27 M/UL — LOW (ref 4.7–6.1)
RBC # FLD: 12.7 % — SIGNIFICANT CHANGE UP (ref 11.5–14.5)
SODIUM SERPL-SCNC: 135 MMOL/L — SIGNIFICANT CHANGE UP (ref 135–146)
WBC # BLD: 7.52 K/UL — SIGNIFICANT CHANGE UP (ref 4.8–10.8)
WBC # FLD AUTO: 7.52 K/UL — SIGNIFICANT CHANGE UP (ref 4.8–10.8)

## 2023-08-08 PROCEDURE — 99285 EMERGENCY DEPT VISIT HI MDM: CPT

## 2023-08-08 PROCEDURE — 36415 COLL VENOUS BLD VENIPUNCTURE: CPT

## 2023-08-08 PROCEDURE — 80307 DRUG TEST PRSMV CHEM ANLYZR: CPT

## 2023-08-08 PROCEDURE — 82962 GLUCOSE BLOOD TEST: CPT

## 2023-08-08 PROCEDURE — 70553 MRI BRAIN STEM W/O & W/DYE: CPT

## 2023-08-08 PROCEDURE — 70450 CT HEAD/BRAIN W/O DYE: CPT | Mod: 26,MA

## 2023-08-08 PROCEDURE — 95819 EEG AWAKE AND ASLEEP: CPT

## 2023-08-08 PROCEDURE — 81001 URINALYSIS AUTO W/SCOPE: CPT

## 2023-08-08 PROCEDURE — A9579: CPT

## 2023-08-08 PROCEDURE — 85027 COMPLETE CBC AUTOMATED: CPT

## 2023-08-08 PROCEDURE — 80354 DRUG SCREENING FENTANYL: CPT

## 2023-08-08 PROCEDURE — 85025 COMPLETE CBC W/AUTO DIFF WBC: CPT

## 2023-08-08 PROCEDURE — 71045 X-RAY EXAM CHEST 1 VIEW: CPT | Mod: 26

## 2023-08-08 PROCEDURE — 83735 ASSAY OF MAGNESIUM: CPT

## 2023-08-08 PROCEDURE — 87086 URINE CULTURE/COLONY COUNT: CPT

## 2023-08-08 PROCEDURE — 80048 BASIC METABOLIC PNL TOTAL CA: CPT

## 2023-08-08 PROCEDURE — 99223 1ST HOSP IP/OBS HIGH 75: CPT

## 2023-08-08 RX ORDER — GLUCAGON INJECTION, SOLUTION 0.5 MG/.1ML
1 INJECTION, SOLUTION SUBCUTANEOUS ONCE
Refills: 0 | Status: DISCONTINUED | OUTPATIENT
Start: 2023-08-08 | End: 2023-08-15

## 2023-08-08 RX ORDER — DEXTROSE 50 % IN WATER 50 %
25 SYRINGE (ML) INTRAVENOUS ONCE
Refills: 0 | Status: DISCONTINUED | OUTPATIENT
Start: 2023-08-08 | End: 2023-08-09

## 2023-08-08 RX ORDER — DEXTROSE 50 % IN WATER 50 %
15 SYRINGE (ML) INTRAVENOUS ONCE
Refills: 0 | Status: DISCONTINUED | OUTPATIENT
Start: 2023-08-08 | End: 2023-08-09

## 2023-08-08 RX ORDER — MAGNESIUM SULFATE 500 MG/ML
2 VIAL (ML) INJECTION ONCE
Refills: 0 | Status: COMPLETED | OUTPATIENT
Start: 2023-08-08 | End: 2023-08-08

## 2023-08-08 RX ORDER — INSULIN GLARGINE 100 [IU]/ML
20 INJECTION, SOLUTION SUBCUTANEOUS AT BEDTIME
Refills: 0 | Status: DISCONTINUED | OUTPATIENT
Start: 2023-08-08 | End: 2023-08-09

## 2023-08-08 RX ORDER — SODIUM CHLORIDE 9 MG/ML
1000 INJECTION, SOLUTION INTRAVENOUS ONCE
Refills: 0 | Status: COMPLETED | OUTPATIENT
Start: 2023-08-08 | End: 2023-08-08

## 2023-08-08 RX ORDER — INSULIN LISPRO 100/ML
VIAL (ML) SUBCUTANEOUS
Refills: 0 | Status: DISCONTINUED | OUTPATIENT
Start: 2023-08-08 | End: 2023-08-09

## 2023-08-08 RX ORDER — ENOXAPARIN SODIUM 100 MG/ML
40 INJECTION SUBCUTANEOUS EVERY 24 HOURS
Refills: 0 | Status: DISCONTINUED | OUTPATIENT
Start: 2023-08-08 | End: 2023-08-15

## 2023-08-08 RX ORDER — ATORVASTATIN CALCIUM 80 MG/1
10 TABLET, FILM COATED ORAL AT BEDTIME
Refills: 0 | Status: DISCONTINUED | OUTPATIENT
Start: 2023-08-08 | End: 2023-08-15

## 2023-08-08 RX ORDER — SODIUM CHLORIDE 9 MG/ML
1000 INJECTION, SOLUTION INTRAVENOUS
Refills: 0 | Status: DISCONTINUED | OUTPATIENT
Start: 2023-08-08 | End: 2023-08-15

## 2023-08-08 RX ORDER — ASPIRIN/CALCIUM CARB/MAGNESIUM 324 MG
81 TABLET ORAL DAILY
Refills: 0 | Status: DISCONTINUED | OUTPATIENT
Start: 2023-08-08 | End: 2023-08-15

## 2023-08-08 RX ORDER — DEXTROSE 50 % IN WATER 50 %
12.5 SYRINGE (ML) INTRAVENOUS ONCE
Refills: 0 | Status: DISCONTINUED | OUTPATIENT
Start: 2023-08-08 | End: 2023-08-09

## 2023-08-08 RX ADMIN — Medication 25 GRAM(S): at 11:41

## 2023-08-08 RX ADMIN — INSULIN GLARGINE 20 UNIT(S): 100 INJECTION, SOLUTION SUBCUTANEOUS at 21:53

## 2023-08-08 RX ADMIN — SODIUM CHLORIDE 2000 MILLILITER(S): 9 INJECTION, SOLUTION INTRAVENOUS at 09:28

## 2023-08-08 NOTE — ED ADULT NURSE NOTE - BREATHING, MLM
Renal Ultrasound lab order placed.     Spoke with Noris and informed her that Dr. Barnes would like a renal ultrasound prior to her 5/17/23 visit with him. Noris verbalized her understanding and is agreeable.     Noris states she is in [place] for the winter and she will be back sometime in April. Noris states she will get the Ultrasound done once she is back in [place]. Writer did provide Noris with [employer]'s phone # as well.     Nothing further needed at this time.   Spontaneous, unlabored and symmetrical

## 2023-08-08 NOTE — H&P ADULT - ATTENDING COMMENTS
Patient seen and examined at bedside independently of the residents. I read the resident's note and agree with the plan with the additions and corrections as noted below. My note supersedes the resident's note.     REVIEW OF SYSTEMS:  Negative except in HPI.     PMH: HTN, DM II, Schizophrenia    FHx: Reviewed. No fhx of asthma/copd, No fhx of liver and pulmonary disease. No fhx of hematological disorder.     Physical Exam:  GEN: No acute distress. Awake, Alert and oriented x 3.   Head: Atraumatic, Normocephalic.   Eye: PEERLA. No sclera icterus. EOMI.   ENT: Normal oropharynx, no thyromegaly, no mass, no lymphadenopathy.   LUNGS: Clear to auscultation bilaterally. No wheeze/rales/crackles.   HEART: Normal. S1/S2 present. RRR. No murmur/gallops.   ABD: Soft, non-tender, non-distended. Bowel sounds present.   EXT: No pitting edema. No erythema. No tenderness.  Integumentary: No rash, No sore, No petechia.   NEURO: CN III-XII intact. Strength: 5/5 b/l ULE. Sensory intact b/l ULE.     Vital Signs Last 24 Hrs  T(C): 37 (08 Aug 2023 15:40), Max: 37 (08 Aug 2023 15:40)  T(F): 98.6 (08 Aug 2023 15:40), Max: 98.6 (08 Aug 2023 15:40)  HR: 85 (08 Aug 2023 15:40) (72 - 92)  BP: 163/79 (08 Aug 2023 15:40) (130/72 - 163/79)  BP(mean): --  RR: 18 (08 Aug 2023 15:40) (18 - 18)  SpO2: 100% (08 Aug 2023 15:40) (99% - 100%)    Parameters below as of 08 Aug 2023 15:40  Patient On (Oxygen Delivery Method): room air      Please see the above notes for Labs and radiology.     Assessment and Plan:     69 yo M with hx of HTN, DM II, Schizophrenia and recent admission last month for AMS presents for altered mental status from [facility] assisted living.    AMS   - CTH neg for acute intracranial patholgy.   - No feve/WBC. CXR unremarkable.   - check UA. will treat if positive.   - s/p eval by neuro in ED --> check REEG and MRI brain (please order in AM after confirming with sister for safety check)     HTN - c/w home med  DM II - monitor FS AC HS. Insulin regimen.     DVT ppx: Lovenox SC  GI ppx:  PPI   Diet: Soft diet   Activity: as tolerated.     Date seen by the attendin2023  Total time spent: 75 minutes. Patient seen and examined at bedside independently of the residents. I read the resident's note and agree with the plan with the additions and corrections as noted below. My note supersedes the resident's note.     REVIEW OF SYSTEMS:  Negative except in HPI.     PMH: HTN, DM II, Schizophrenia    FHx: Reviewed. No fhx of asthma/copd, No fhx of liver and pulmonary disease. No fhx of hematological disorder.     Physical Exam:  GEN: No acute distress. Awake, Alert and oriented x 2.   Head: Atraumatic, Normocephalic.   Eye: PEERLA. No sclera icterus. EOMI.   ENT: Normal oropharynx, no thyromegaly, no mass, no lymphadenopathy.   LUNGS: Clear to auscultation bilaterally. No wheeze/rales/crackles.   HEART: Normal. S1/S2 present. RRR. No murmur/gallops.   ABD: Soft, non-tender, non-distended. Bowel sounds present.   EXT: No pitting edema. No erythema. No tenderness.  Integumentary: No rash, No sore, No petechia.   NEURO: CN III-XII intact. Strength: 5/5 b/l ULE. Sensory intact b/l ULE.     Vital Signs Last 24 Hrs  T(C): 37 (08 Aug 2023 15:40), Max: 37 (08 Aug 2023 15:40)  T(F): 98.6 (08 Aug 2023 15:40), Max: 98.6 (08 Aug 2023 15:40)  HR: 85 (08 Aug 2023 15:40) (72 - 92)  BP: 163/79 (08 Aug 2023 15:40) (130/72 - 163/79)  BP(mean): --  RR: 18 (08 Aug 2023 15:40) (18 - 18)  SpO2: 100% (08 Aug 2023 15:40) (99% - 100%)    Parameters below as of 08 Aug 2023 15:40  Patient On (Oxygen Delivery Method): room air      Please see the above notes for Labs and radiology.     Assessment and Plan:     69 yo M with hx of HTN, DM II, Schizophrenia and recent admission last month for AMS presents for altered mental status from [facility]'s assisted living.    AMS   - CTH neg for acute intracranial pathology.   - No feve/WBC.  CXR unremarkable.   - check UA. will treat if positive.   - s/p eval by neuro in ED --> check REEG and MRI brain (please order in AM after confirming with sister for safety check)     HTN - c/w home med  DM II - monitor FS AC HS. Insulin regimen.     DVT ppx: Lovenox SC  GI ppx:  PPI   Diet: Soft diet   Activity: as tolerated.     Date seen by the attendin2023  Total time spent: 75 minutes.

## 2023-08-08 NOTE — ED ADULT NURSE NOTE - NSICDXPASTSURGICALHX_GEN_ALL_CORE_FT
Follow-up of hypertension.  No side effects from the medication.  Only on it for about 2 weeks waited to started due to a trip.  Morbid obesity BMI of 39.7 he is down 9 lb.  Dieting.  Triglycerides 123 cholesterol 193 HDL 60 .4.  CMP is normal and CBC is normal TSH is 2.62.    Physical examination vital signs noted.  Neck without bruit.  Chest clear.  Heart regular rate rhythm.  Extremities without edema positive pulses.    Impression.  Hypertension uncontrolled.    Plan continue the amlodipine 5 mg daily 90 pills.  Add Avapro 150 mg daily 30 pills.  Continue to decrease weight.  Follow-up in 3 weeks regarding the blood pressure.  
PAST SURGICAL HISTORY:  No significant past surgical history     No significant past surgical history

## 2023-08-08 NOTE — CONSULT NOTE ADULT - ATTENDING COMMENTS
Seen and examined the patient. Patient is still not oriented to time. Very poor historian. Unclear about the baseline. Not likely acute neurological process. Recommend REEG and Brain MRI.

## 2023-08-08 NOTE — ED ADULT NURSE NOTE - CHIEF COMPLAINT QUOTE
JENNY owens [facility], patient was discharged from ER at 1am, patient also seen at [facility] and discharged yesterday, staff states worsening in mental status since yesterday, staff spoke to MD last night explaining that patient has been acting off, patient sent back to [facility] home, FS in triage 171

## 2023-08-08 NOTE — ED ADULT NURSE NOTE - OBJECTIVE STATEMENT
JENNY owens [facility], patient was discharged from ER at 1am, patient also seen at [facility] and discharged yesterday, staff states worsening in mental status since yesterday, staff spoke to MD last night explaining that patient has been acting off, patient sent back to [facility] home, FS in triage 171

## 2023-08-08 NOTE — ED ADULT TRIAGE NOTE - CHIEF COMPLAINT QUOTE
JENNY owens [facility], patient was discharged from ER at 1am, patient also seen at [facility] and discharged yesterday, staff states worsening in mental status since yesterday, staff spoke to MD last night explaining that patient has been acting off, patient sent back to [facility] home, FS in triage 171

## 2023-08-08 NOTE — CONSULT NOTE ADULT - SUBJECTIVE AND OBJECTIVE BOX
NEUROLOGY CONSULT    HPI:  67 year old male with a PMH of DM, HTN, schizophrenia, and recent admission last month for AMS presents for altered mental status. BIB facility, not answering questions and exhibiting weakness per staff.     Patient alert and answering questions at bedside, patient at baseline AOx1. Admits fatigue and states he has not had anything to eat today.        MEDICATIONS  Home Medications:  Haldol 1 mg oral tablet: 1 tab(s) orally once a day (03 Jul 2023 05:31)  Lipitor 10 mg oral tablet: 1 tab(s) orally once a day (03 Jul 2023 05:30)  Prinivil 40 mg oral tablet: 1 tab(s) orally once a day (03 Jul 2023 05:31)    MEDICATIONS  (STANDING):    MEDICATIONS  (PRN):      FAMILY HISTORY:    SOCIAL HISTORY: negative for tobacco, alcohol, or ilicit drug use.    Allergies    No Known Allergies    Intolerances        GEN: NAD, pleasant, cooperative    NEUROLOGICAL EXAMINATION:  GENERAL:  Appearance is consistent with chronologic age.   COGNITION/LANGUAGE:  Awake, alert, and oriented to person, place, time and date.  Fluent, intact comprehension, repetition, naming. Recent and remote memory intact.  Fund of knowledge is appropriate.  Nondysarthric.    CRANIAL NERVES:   - Eyes:  Visual acuity intact. Pupils equal round and reactive, no RAPD. EOMI w/o nystagmus, skew or reported double vision. Normal visual field on confrontation. No ptosis/weakness of eyelid closure.   - Face:  Facial sensation normal V1 - 3, no facial asymmetry.    - Ears/Nose/Throat:  Hearing grossly intact b/l to finger rub.  Palate elevates midline.  Tongue and uvula midline.   MOTOR EXAM:  (R/L) 5/5 UE; 5/5 LE.  No observable drift. Normal tone and bulk. No tenderness, twitching, tremors or involuntary movements.  SENSORY EXAM:  Intact to light touch and pinprick, pain, temperature and proprioception and vibration in all extremities.  REFLEXES:   2+ b/l biceps, triceps, patella and achilles.  Plantar response downgoing b/l.  Jaw jerk, [place], clonus absent.  CEREBELLUM:  Finger to nose/Heel to knee and shin intact.  No dysmetria.    GAIT: narrow based and normal.  Romberg: negative.   NIHSS: **** ASPECT Score: ***** ICH Score: ***** (GCS)    LABS:                        13.1   7.52  )-----------( 284      ( 08 Aug 2023 09:40 )             38.6     08-08    135  |  97<L>  |  8<L>  ----------------------------<  173<H>  4.2   |  29  |  0.5<L>    Ca    9.2      08 Aug 2023 09:40  Mg     1.5     08-08    TPro  6.3  /  Alb  4.1  /  TBili  0.4  /  DBili  x   /  AST  14  /  ALT  10  /  AlkPhos  106  08-08    Hemoglobin A1C:   Vitamin B12     CAPILLARY BLOOD GLUCOSE      POCT Blood Glucose.: 170 mg/dL (08 Aug 2023 08:42)      Urinalysis Basic - ( 08 Aug 2023 09:40 )    Color: x / Appearance: x / SG: x / pH: x  Gluc: 173 mg/dL / Ketone: x  / Bili: x / Urobili: x   Blood: x / Protein: x / Nitrite: x   Leuk Esterase: x / RBC: x / WBC x   Sq Epi: x / Non Sq Epi: x / Bacteria: x            Microbiology:      RADIOLOGY, EKG AND ADDITIONAL TESTS: Reviewed.           NEUROLOGY CONSULT    HPI:  68 year old male with a PMH of DM, HTN, schizophrenia, and recent admission last month for AMS presents for altered mental status from [facility] assisted living. Staff from [facility] state that patient seems weak and when asked questions will not answer.  Patient seen for the same twice in the ED yesterday with negative work-ups. Patient was admitted one month ago for AMS likely metabolic encephalopathy possibly due to hypoglycemia, was a stroke code for unresponsiveness and seen by neurology at that time, negative rEEG and started on Thiamine therapy and Aspirin 81mg ECAD. At bedside, patient is alert and answering questions at bedside, fully cooperative with neurological examination. Patient AOx1 which is his baseline. Admits fatigue and states he has not had anything to eat today. Otherwise denies any other complaints.    CTH and CXR negative.     MEDICATIONS  Home Medications:  Haldol 1 mg oral tablet: 1 tab(s) orally once a day (03 Jul 2023 05:31)  Lipitor 10 mg oral tablet: 1 tab(s) orally once a day (03 Jul 2023 05:30)  Prinivil 40 mg oral tablet: 1 tab(s) orally once a day (03 Jul 2023 05:31)    MEDICATIONS  (STANDING):    MEDICATIONS  (PRN):    FAMILY HISTORY:    SOCIAL HISTORY: negative for tobacco, alcohol, or ilicit drug use.    Allergies  No Known Allergies  Intolerances        GEN: NAD, pleasant, cooperative    NEUROLOGICAL EXAMINATION:  GENERAL:  Appearance is consistent with chronologic age.   COGNITION/LANGUAGE:  Awake, alert, and oriented to person. Not oriented to place, date, or time.  Fluent, intact comprehension.  Nondysarthric.    CRANIAL NERVES:   - Eyes:  Visual acuity intact. L pupil>R pupil, unchanged from previous admission. EOMI w/o nystagmus, skew or reported double vision. Normal visual field on confrontation. No ptosis/weakness of eyelid closure.   - Face:  Facial sensation normal V1 - 3, no facial asymmetry.    - Ears/Nose/Throat:  Hearing grossly intact b/l to finger rub.  Palate elevates midline.  Tongue and uvula midline.   MOTOR EXAM:  (R/L) 5/5 UE; 4/5 LE.  No observable drift. Normal tone and bulk. No tenderness, twitching, tremors or involuntary movements.  SENSORY EXAM:  Intact to light touch and pinprick, pain, temperature and proprioception and vibration in all extremities.  CEREBELLUM:  Finger to nose/Heel to knee and shin intact.  No dysmetria.    GAIT: not assessed  Romberg: not assessed  NIHSS: **** ASPECT Score: ***** ICH Score: ***** (GCS)    LABS:                        13.1   7.52  )-----------( 284      ( 08 Aug 2023 09:40 )             38.6     08-08    135  |  97<L>  |  8<L>  ----------------------------<  173<H>  4.2   |  29  |  0.5<L>    Ca    9.2      08 Aug 2023 09:40  Mg     1.5     08-08    TPro  6.3  /  Alb  4.1  /  TBili  0.4  /  DBili  x   /  AST  14  /  ALT  10  /  AlkPhos  106  08-08    Hemoglobin A1C:   Vitamin B12     CAPILLARY BLOOD GLUCOSE      POCT Blood Glucose.: 170 mg/dL (08 Aug 2023 08:42)      Urinalysis Basic - ( 08 Aug 2023 09:40 )    Color: x / Appearance: x / SG: x / pH: x  Gluc: 173 mg/dL / Ketone: x  / Bili: x / Urobili: x   Blood: x / Protein: x / Nitrite: x   Leuk Esterase: x / RBC: x / WBC x   Sq Epi: x / Non Sq Epi: x / Bacteria: x            Microbiology:      RADIOLOGY, EKG AND ADDITIONAL TESTS: Reviewed.

## 2023-08-08 NOTE — ED ADULT NURSE NOTE - NSFALLUNIVINTERV_ED_ALL_ED
How Severe Is Your Skin Lesion?: moderate Is This A New Presentation, Or A Follow-Up?: Skin Lesion Bed/Stretcher in lowest position, wheels locked, appropriate side rails in place/Call bell, personal items and telephone in reach/Instruct patient to call for assistance before getting out of bed/chair/stretcher/Non-slip footwear applied when patient is off stretcher/[place] to call system/Physically safe environment - no spills, clutter or unnecessary equipment/Purposeful proactive rounding/Room/bathroom lighting operational, light cord in reach

## 2023-08-08 NOTE — ED ADULT NURSE REASSESSMENT NOTE - NS ED NURSE REASSESS COMMENT FT1
Pt getting out of bed. Re-educated on safety. Nonskid socks placed. Pt repeatedly educated on importance of safety and not getting up. Charge nurse made aware.

## 2023-08-08 NOTE — ED PROVIDER NOTE - PHYSICAL EXAMINATION
GENERAL: Resting on stretcher, in no acute distress.   SKIN: warm, dry  HEAD: Normocephalic; atraumatic.  EYES: L pupil 3mm, R pupil 1cm, EOMI, no conjunctival erythema  ENT: Dry mucous membranes. No nasal discharge; airway clear.  NECK: Supple; non tender.  CARD: S1, S2 normal; no murmurs, gallops, or rubs. Regular rate and rhythm.   RESP: LCTAB; No wheezes, rales, rhonchi, or stridor.  ABD: soft, nontender, and nondistended  EXT: Normal ROM.  No LE edema bilaterally. R thigh TTP  NEURO: A/ox2, CN II-XII intact, sensation, intact, 5/5x4 strength, follows commands, answers questions appropriately   PSYCH: Cooperative, appropriate.

## 2023-08-08 NOTE — CONSULT NOTE ADULT - ASSESSMENT
Infectious w/u  EEG   MRI 68 year old male with a PMH of DM, HTN, schizophrenia, and recent admission last month for AMS presents for altered mental status from [facility] assisted living. Staff from [facility] state that patient seems weak and when asked questions will not answer.  Patient seen for the same twice in the ED yesterday with negative work-ups. Patient was admitted one month ago for AMS likely metabolic encephalopathy possibly due to hypoglycemia, was a stroke code for unresponsiveness and seen by neurology at that time, negative rEEG and started on Thiamine therapy and Aspirin 81mg ECAD. At bedside, patient is alert and answering questions at bedside, fully cooperative with neurological examination. Patient AOx1 which is his baseline. Admits fatigue and states he has not had anything to eat today. Otherwise denies any other complaints.    Impression  -Low suspicion for neurological cause of AMS  -Consider other etiologies  -rEEG   -MRI brain  -Admit to medicine 68 year old male with a PMH of DM, HTN, schizophrenia, and recent admission last month for AMS presents for altered mental status from [facility] assisted living. Staff from [facility] state that patient seems weak and when asked questions will not answer.  Patient seen for the same twice in the ED yesterday with negative work-ups. Patient was admitted one month ago for AMS likely metabolic encephalopathy possibly due to hypoglycemia, was a stroke code for unresponsiveness and seen by neurology at that time, negative rEEG and started on Thiamine therapy and Aspirin 81mg ECAD. At bedside, patient is alert and answering questions at bedside, fully cooperative with neurological examination. Patient AOx1 which is his baseline. Admits fatigue and states he has not had anything to eat today. Otherwise denies any other complaints.    Impression  -Low suspicion for neurological cause of AMS  -Consider other etiologies  -rEEG   -MRI brain w/wo   -Admit to medicine

## 2023-08-08 NOTE — H&P ADULT - NSHPPHYSICALEXAM_GEN_ALL_CORE
VITALS:   T(C): 37 (08-08-23 @ 15:40), Max: 37 (08-08-23 @ 15:40)  HR: 85 (08-08-23 @ 15:40) (72 - 92)  BP: 163/79 (08-08-23 @ 15:40) (130/72 - 163/79)  RR: 18 (08-08-23 @ 15:40) (18 - 18)  SpO2: 100% (08-08-23 @ 15:40) (99% - 100%)    GENERAL: NAD, lying in bed comfortably  HEAD:  Atraumatic, normocephalic  EYES: EOMI, PERRLA, conjunctiva and sclera clear  ENT: Moist mucous membranes  NECK: Supple, no JVD  HEART: Regular rate and rhythm, no murmurs, rubs, or gallops  LUNGS: Unlabored respirations.  Clear to auscultation bilaterally, no crackles, wheezing, or rhonchi  ABDOMEN: Soft, nontender, nondistended, +BS  EXTREMITIES: 2+ peripheral pulses bilaterally. No clubbing, cyanosis, or edema  NERVOUS SYSTEM:  A&Ox3, no focal deficits   SKIN: No rashes or lesions

## 2023-08-08 NOTE — H&P ADULT - HISTORY OF PRESENT ILLNESS
68-year-old male with a PMH of DM, HTN, schizophrenia, and recent admission last month for AMS presents for altered mental status from [facility] assisted living. Staff from [facility] state that patient seems weak and when asked questions will not answer.  Patient was here yesterday for the same complaint twice in the ED and workup was negative. Patient was admitted one month ago for AMS likely metabolic encephalopathy possibly due to hypoglycemia, was a stroke code for unresponsiveness and seen by neurology at that time, negative rEEG and started on Thiamine therapy and Aspirin 81mg. When interviewed patient is alert and cooperative, neuro exam no focal deficit, AAOx1 (baseline). Denies chest pain, abdominal pain, fevers, chills, diarrhea, nausea or vomiting. CTH and CXR negative.    In the ED VS were /72, HR 92, RR 18, temp 98.1 lab workup was at baseline except for hypomagnesemia(1.5) that was repleted. CTH and chest xray were negative.  Was seen by neurology that recommended rEEG and MR brain.  68-year-old male with a PMH of DM, HTN, schizophrenia, and recent admission last month for AMS presents for altered mental status from [facility] assisted living. Staff from [facility] state that patient seems weak and when asked questions will not answer.  Patient was here yesterday for the same complaint twice in the ED and workup was negative. Patient was admitted one month ago for AMS likely metabolic encephalopathy possibly due to hypoglycemia, was a stroke code for unresponsiveness and seen by neurology at that time, negative rEEG and started on Thiamine therapy and Aspirin 81mg. When interviewed patient is alert, pleasant, and cooperative, neuro exam no focal deficit just generalize weakness, AAOx1 (baseline). Denies chest pain, abdominal pain, fevers, chills, diarrhea, nausea or vomiting.    In the ED VS were /72, HR 92, RR 18, temp 98.1 lab workup was at baseline except for hypomagnesemia(1.5) that was repleted. CTH and chest xray were negative.  Was seen by neurology that recommended rEEG and MR brain.

## 2023-08-08 NOTE — ED PROVIDER NOTE - OBJECTIVE STATEMENT
68-year-old male, with past medical history of diabetes and schizo hernia, presents emergency department from [facility] for altered mental status.  Patient has been seen for the same twice in the ED yesterday with negative work-ups.  Prior to that he was admitted to the hospital as a stroke code with a negative work-up as well.  Staff from [facility] state that the patient seems weak and when asked questions will not answer.  Patient denies recent fever, chills, nausea, vomiting, abdominal pain, chest pain, shortness of breath, numbness, tingling.  Patient is alert and oriented to self only at baseline.

## 2023-08-08 NOTE — H&P ADULT - ASSESSMENT
68-year-old male with a PMH of DM, HTN, schizophrenia, and recent admission last month for AMS presents for altered mental status from [facility] assisted living. Staff from [facility] state that patient seems weak and when asked questions will not answer.  Patient was here yesterday for the same complaint twice in the ED and workup was negative. Patient was admitted one month ago for AMS likely metabolic encephalopathy possibly due to hypoglycemia, was a stroke code for unresponsiveness and seen by neurology at that time, negative rEEG and started on Thiamine therapy and Aspirin 81mg. When interviewed patient is alert and cooperative, neuro exam no focal deficit, AAOx1 (baseline). Denies chest pain, abdominal pain, fevers, chills, diarrhea, nausea or vomiting.        #AMS  - VS stable in the ED.   - Labs insignifcant except for hypoMg that was repleted   - CTH and CXR negative   - Alcohol serum negative   - Was admitted last month for the same reason, was a stroke code, neuro evaluated him last time EEG was negative. Was started on thiamine and asa   - Was here yesterday for AMS twice, workup was negative   - Seen by neurology in the ED, recs appreciated     - MRI brain   - rEEG     # HTN:    - cw home medications    # DM:  - POCT 140-180   - Avoid hypoglycemia   - ISS  - on home Lantus 40        Misc:  DVTppx: lovenox    GI ppx: protonix   Dispo: inpatient floors   Diet: minced and moist per SS eval last visit  68-year-old male with a PMH of DM, HTN, schizophrenia, and recent admission last month for AMS presents for altered mental status from [facility] assisted living. Staff from [facility] state that patient seems weak and when asked questions will not answer.  Patient was here yesterday for the same complaint twice in the ED and workup was negative. Patient was admitted one month ago for AMS likely metabolic encephalopathy possibly due to hypoglycemia, was a stroke code for unresponsiveness and seen by neurology at that time, negative rEEG and started on Thiamine therapy and Aspirin 81mg. When interviewed patient is alert and cooperative, neuro exam no focal deficit, AAOx1 (baseline). Denies chest pain, abdominal pain, fevers, chills, diarrhea, nausea or vomiting.        #AMS  - VS stable in the ED.   - Labs insignifcant except for hypoMg that was repleted   - CTH and CXR negative   - Alcohol serum negative   - Was admitted last month for the same reason, was a stroke code, neuro evaluated him last time EEG was negative. Was started on thiamine and asa   - Was here yesterday for AMS twice, workup was negative   - Seen by neurology in the ED, recs appreciated     - MRI brain (tried contacting the sister Ruth twice but was unable to reach her for MRI survey, will hold off the MRI now as patient is not a reliable historian, please confirm MR survey in the morning0   - rEEG     # HTN:    - cw home medications    # DM:  - POCT 140-180   - Avoid hypoglycemia   - ISS lantus 20 (hx of hypoglycemia)   - on home Lantus 40        Misc:  DVTppx: lovenox    GI ppx: protonix   Dispo: inpatient floors   Diet: minced and moist per SS eval last visit

## 2023-08-08 NOTE — H&P ADULT - NSHPLABSRESULTS_GEN_ALL_CORE
.  LABS:                         13.1   7.52  )-----------( 284      ( 08 Aug 2023 09:40 )             38.6     08-08    135  |  97<L>  |  8<L>  ----------------------------<  173<H>  4.2   |  29  |  0.5<L>    Ca    9.2      08 Aug 2023 09:40  Mg     1.5     08-08    TPro  6.3  /  Alb  4.1  /  TBili  0.4  /  DBili  x   /  AST  14  /  ALT  10  /  AlkPhos  106  08-08      Urinalysis Basic - ( 08 Aug 2023 09:40 )    Color: x / Appearance: x / SG: x / pH: x  Gluc: 173 mg/dL / Ketone: x  / Bili: x / Urobili: x   Blood: x / Protein: x / Nitrite: x   Leuk Esterase: x / RBC: x / WBC x   Sq Epi: x / Non Sq Epi: x / Bacteria: x        Lactate, Blood: 1.2 mmol/L (08-08 @ 09:40)      RADIOLOGY, EKG & ADDITIONAL TESTS: Reviewed.

## 2023-08-08 NOTE — ED PROVIDER NOTE - CLINICAL SUMMARY MEDICAL DECISION MAKING FREE TEXT BOX
Patient presents with change in mental status as per [facility]. Concern for possible seizure like activity. labs, ct done. no acute findings. patient seen by neurology who recommends medical admission for EEG and mri. Patient admitted for further management.

## 2023-08-09 LAB
GLUCOSE BLDC GLUCOMTR-MCNC: 115 MG/DL — HIGH (ref 70–99)
GLUCOSE BLDC GLUCOMTR-MCNC: 119 MG/DL — HIGH (ref 70–99)
GLUCOSE BLDC GLUCOMTR-MCNC: 151 MG/DL — HIGH (ref 70–99)
GLUCOSE BLDC GLUCOMTR-MCNC: 152 MG/DL — HIGH (ref 70–99)
GLUCOSE BLDC GLUCOMTR-MCNC: 177 MG/DL — HIGH (ref 70–99)
GLUCOSE BLDC GLUCOMTR-MCNC: 83 MG/DL — SIGNIFICANT CHANGE UP (ref 70–99)

## 2023-08-09 PROCEDURE — 99232 SBSQ HOSP IP/OBS MODERATE 35: CPT

## 2023-08-09 PROCEDURE — 99221 1ST HOSP IP/OBS SF/LOW 40: CPT

## 2023-08-09 RX ORDER — GLUCAGON INJECTION, SOLUTION 0.5 MG/.1ML
1 INJECTION, SOLUTION SUBCUTANEOUS ONCE
Refills: 0 | Status: DISCONTINUED | OUTPATIENT
Start: 2023-08-09 | End: 2023-08-15

## 2023-08-09 RX ORDER — ATORVASTATIN CALCIUM 80 MG/1
1 TABLET, FILM COATED ORAL
Refills: 0 | DISCHARGE

## 2023-08-09 RX ORDER — SODIUM CHLORIDE 9 MG/ML
1000 INJECTION, SOLUTION INTRAVENOUS
Refills: 0 | Status: DISCONTINUED | OUTPATIENT
Start: 2023-08-09 | End: 2023-08-15

## 2023-08-09 RX ORDER — INSULIN GLARGINE 100 [IU]/ML
40 INJECTION, SOLUTION SUBCUTANEOUS AT BEDTIME
Refills: 0 | Status: DISCONTINUED | OUTPATIENT
Start: 2023-08-09 | End: 2023-08-09

## 2023-08-09 RX ORDER — SODIUM CHLORIDE 9 MG/ML
1000 INJECTION, SOLUTION INTRAVENOUS
Refills: 0 | Status: DISCONTINUED | OUTPATIENT
Start: 2023-08-09 | End: 2023-08-09

## 2023-08-09 RX ORDER — DEXTROSE 50 % IN WATER 50 %
25 SYRINGE (ML) INTRAVENOUS ONCE
Refills: 0 | Status: DISCONTINUED | OUTPATIENT
Start: 2023-08-09 | End: 2023-08-15

## 2023-08-09 RX ORDER — HALOPERIDOL DECANOATE 100 MG/ML
1 INJECTION INTRAMUSCULAR
Refills: 0 | DISCHARGE

## 2023-08-09 RX ORDER — DEXTROSE 50 % IN WATER 50 %
25 SYRINGE (ML) INTRAVENOUS ONCE
Refills: 0 | Status: DISCONTINUED | OUTPATIENT
Start: 2023-08-09 | End: 2023-08-09

## 2023-08-09 RX ORDER — DEXTROSE 50 % IN WATER 50 %
15 SYRINGE (ML) INTRAVENOUS ONCE
Refills: 0 | Status: DISCONTINUED | OUTPATIENT
Start: 2023-08-09 | End: 2023-08-15

## 2023-08-09 RX ORDER — DEXTROSE 50 % IN WATER 50 %
12.5 SYRINGE (ML) INTRAVENOUS ONCE
Refills: 0 | Status: DISCONTINUED | OUTPATIENT
Start: 2023-08-09 | End: 2023-08-09

## 2023-08-09 RX ORDER — DEXTROSE 50 % IN WATER 50 %
15 SYRINGE (ML) INTRAVENOUS ONCE
Refills: 0 | Status: DISCONTINUED | OUTPATIENT
Start: 2023-08-09 | End: 2023-08-09

## 2023-08-09 RX ORDER — GLUCAGON INJECTION, SOLUTION 0.5 MG/.1ML
1 INJECTION, SOLUTION SUBCUTANEOUS ONCE
Refills: 0 | Status: DISCONTINUED | OUTPATIENT
Start: 2023-08-09 | End: 2023-08-09

## 2023-08-09 RX ORDER — DEXTROSE 50 % IN WATER 50 %
12.5 SYRINGE (ML) INTRAVENOUS ONCE
Refills: 0 | Status: DISCONTINUED | OUTPATIENT
Start: 2023-08-09 | End: 2023-08-15

## 2023-08-09 RX ORDER — INSULIN GLARGINE 100 [IU]/ML
20 INJECTION, SOLUTION SUBCUTANEOUS AT BEDTIME
Refills: 0 | Status: DISCONTINUED | OUTPATIENT
Start: 2023-08-09 | End: 2023-08-14

## 2023-08-09 RX ADMIN — Medication 81 MILLIGRAM(S): at 14:12

## 2023-08-09 RX ADMIN — Medication 1: at 13:45

## 2023-08-09 RX ADMIN — ATORVASTATIN CALCIUM 10 MILLIGRAM(S): 80 TABLET, FILM COATED ORAL at 23:29

## 2023-08-09 NOTE — ED ADULT NURSE REASSESSMENT NOTE - NS ED NURSE REASSESS COMMENT FT1
Patient received alert and responsive and resting in bed. No respiratory distress noted. Falls precaution in place. Will continue to monitor.

## 2023-08-09 NOTE — ED ADULT NURSE REASSESSMENT NOTE - NS ED NURSE REASSESS COMMENT FT1
Pt assessed. NAD at this time. All fall precautions in place- bed alarm on highest setting and volume, red socks on, yellow bracelet on. Will continue to monitor, round, assess.

## 2023-08-09 NOTE — PATIENT PROFILE ADULT - FALL HARM RISK - HARM RISK INTERVENTIONS

## 2023-08-09 NOTE — PROGRESS NOTE ADULT - SUBJECTIVE AND OBJECTIVE BOX
ARLEN CALDERON  68y  Male      Patient is a 68y old  Male who presents with a chief complaint of AMS (08 Aug 2023 20:22)      INTERVAL HPI/OVERNIGHT EVENTS:  Patient feels ok, no new complaints, no weakness.   Vital Signs Last 24 Hrs  T(C): 36.3 (09 Aug 2023 16:11), Max: 37 (09 Aug 2023 00:03)  T(F): 97.4 (09 Aug 2023 16:11), Max: 98.6 (09 Aug 2023 00:03)  HR: 83 (09 Aug 2023 16:11) (75 - 83)  BP: 156/74 (09 Aug 2023 16:11) (152/77 - 179/83)  BP(mean): --  RR: 18 (09 Aug 2023 16:11) (18 - 18)  SpO2: 98% (09 Aug 2023 16:11) (98% - 100%)    Parameters below as of 09 Aug 2023 16:11  Patient On (Oxygen Delivery Method): room air                Consultant(s) Notes Reviewed:  [x ] YES  [ ] NO          MEDICATIONS  (STANDING):  aspirin enteric coated 81 milliGRAM(s) Oral daily  atorvastatin 10 milliGRAM(s) Oral at bedtime  dextrose 5%. 1000 milliLiter(s) (100 mL/Hr) IV Continuous <Continuous>  dextrose 5%. 1000 milliLiter(s) (50 mL/Hr) IV Continuous <Continuous>  dextrose 50% Injectable 12.5 Gram(s) IV Push once  dextrose 50% Injectable 25 Gram(s) IV Push once  dextrose 50% Injectable 25 Gram(s) IV Push once  enoxaparin Injectable 40 milliGRAM(s) SubCutaneous every 24 hours  glucagon  Injectable 1 milliGRAM(s) IntraMuscular once  insulin glargine Injectable (LANTUS) 20 Unit(s) SubCutaneous at bedtime  insulin lispro (ADMELOG) corrective regimen sliding scale   SubCutaneous three times a day before meals    MEDICATIONS  (PRN):  dextrose Oral Gel 15 Gram(s) Oral once PRN Blood Glucose LESS THAN 70 milliGRAM(s)/deciliter      LABS                          13.1   7.52  )-----------( 284      ( 08 Aug 2023 09:40 )             38.6     08-08    135  |  97<L>  |  8<L>  ----------------------------<  173<H>  4.2   |  29  |  0.5<L>    Ca    9.2      08 Aug 2023 09:40  Mg     1.5         TPro  6.3  /  Alb  4.1  /  TBili  0.4  /  DBili  x   /  AST  14  /  ALT  10  /  AlkPhos  106        Urinalysis Basic - ( 08 Aug 2023 09:40 )    Color: x / Appearance: x / SG: x / pH: x  Gluc: 173 mg/dL / Ketone: x  / Bili: x / Urobili: x   Blood: x / Protein: x / Nitrite: x   Leuk Esterase: x / RBC: x / WBC x   Sq Epi: x / Non Sq Epi: x / Bacteria: x        Lactate Trend   @ 09:40 Lactate:1.2    @ 11:27 Lactate:2.6         CAPILLARY BLOOD GLUCOSE      POCT Blood Glucose.: 151 mg/dL (09 Aug 2023 11:29)        RADIOLOGY & ADDITIONAL TESTS:    Imaging Personally Reviewed:  [ ] YES  [ ] NO    HEALTH ISSUES - PROBLEM Dx:          PHYSICAL EXAM:  GENERAL: NAD, well-developed.  HEAD:  Atraumatic, Normocephalic.  EYES: EOMI, PERRLA, conjunctiva and sclera clear.  NECK: Supple, No JVD.  CHEST/LUNG: Clear to auscultation bilaterally; No wheeze.  HEART: Regular rate and rhythm; S1 S2.   ABDOMEN: Soft, Nontender, Nondistended; Bowel sounds present.  EXTREMITIES:  2+ Peripheral Pulses, No clubbing, cyanosis, or edema.  PSYCH: Alert, oriented to his name, , place.   NEUROLOGY: non-focal. Moves all extremities, no focal weakness or deficit.   SKIN: No rashes or lesions.

## 2023-08-09 NOTE — PROGRESS NOTE ADULT - ASSESSMENT
67 yo M with hx of HTN, DM II, Schizophrenia and recent admission last month for AMS presents for altered mental status from [facility] assisted living.  A/P   Altered Mental status:   Improved, suspected toxic metabolic encephalopathy   On neuro exam today patient is awake, oriented to his name, , place (hospital), moves all extremities, CN II-XII intact.   Head CT showed no acute intracranial pathology.   Alcohol level<10, send urine toxicology, patient denies any drugs abuse, has history of drugs use in the past.   No signs of infection. CXR is clear, pending UA.   Neurology evaluated the patient, less likely neurological cause, recommended EEG and brain MRI, pending.   He was admitted for similar reason few months ago and found with hypoglycemia, now FS is stable. Hold Insulin for now.       DM II   A1C 8.1, confirm Lantus dose, monitor FS    DVT ppx: Lovenox SC

## 2023-08-10 LAB
ANION GAP SERPL CALC-SCNC: 11 MMOL/L — SIGNIFICANT CHANGE UP (ref 7–14)
BASOPHILS # BLD AUTO: 0.07 K/UL — SIGNIFICANT CHANGE UP (ref 0–0.2)
BASOPHILS NFR BLD AUTO: 1.3 % — HIGH (ref 0–1)
BUN SERPL-MCNC: 19 MG/DL — SIGNIFICANT CHANGE UP (ref 10–20)
CALCIUM SERPL-MCNC: 9.5 MG/DL — SIGNIFICANT CHANGE UP (ref 8.4–10.5)
CHLORIDE SERPL-SCNC: 96 MMOL/L — LOW (ref 98–110)
CO2 SERPL-SCNC: 28 MMOL/L — SIGNIFICANT CHANGE UP (ref 17–32)
CREAT SERPL-MCNC: 0.6 MG/DL — LOW (ref 0.7–1.5)
EGFR: 104 ML/MIN/1.73M2 — SIGNIFICANT CHANGE UP
EOSINOPHIL # BLD AUTO: 0.13 K/UL — SIGNIFICANT CHANGE UP (ref 0–0.7)
EOSINOPHIL NFR BLD AUTO: 2.4 % — SIGNIFICANT CHANGE UP (ref 0–8)
GLUCOSE BLDC GLUCOMTR-MCNC: 145 MG/DL — HIGH (ref 70–99)
GLUCOSE BLDC GLUCOMTR-MCNC: 153 MG/DL — HIGH (ref 70–99)
GLUCOSE BLDC GLUCOMTR-MCNC: 179 MG/DL — HIGH (ref 70–99)
GLUCOSE BLDC GLUCOMTR-MCNC: 221 MG/DL — HIGH (ref 70–99)
GLUCOSE SERPL-MCNC: 161 MG/DL — HIGH (ref 70–99)
HCT VFR BLD CALC: 41.6 % — LOW (ref 42–52)
HGB BLD-MCNC: 14.1 G/DL — SIGNIFICANT CHANGE UP (ref 14–18)
IMM GRANULOCYTES NFR BLD AUTO: 0.4 % — HIGH (ref 0.1–0.3)
LYMPHOCYTES # BLD AUTO: 1.18 K/UL — LOW (ref 1.2–3.4)
LYMPHOCYTES # BLD AUTO: 21.6 % — SIGNIFICANT CHANGE UP (ref 20.5–51.1)
MAGNESIUM SERPL-MCNC: 1.8 MG/DL — SIGNIFICANT CHANGE UP (ref 1.8–2.4)
MCHC RBC-ENTMCNC: 30.7 PG — SIGNIFICANT CHANGE UP (ref 27–31)
MCHC RBC-ENTMCNC: 33.9 G/DL — SIGNIFICANT CHANGE UP (ref 32–37)
MCV RBC AUTO: 90.4 FL — SIGNIFICANT CHANGE UP (ref 80–94)
MONOCYTES # BLD AUTO: 0.64 K/UL — HIGH (ref 0.1–0.6)
MONOCYTES NFR BLD AUTO: 11.7 % — HIGH (ref 1.7–9.3)
NEUTROPHILS # BLD AUTO: 3.43 K/UL — SIGNIFICANT CHANGE UP (ref 1.4–6.5)
NEUTROPHILS NFR BLD AUTO: 62.6 % — SIGNIFICANT CHANGE UP (ref 42.2–75.2)
NRBC # BLD: 0 /100 WBCS — SIGNIFICANT CHANGE UP (ref 0–0)
PLATELET # BLD AUTO: 309 K/UL — SIGNIFICANT CHANGE UP (ref 130–400)
PMV BLD: 10.5 FL — HIGH (ref 7.4–10.4)
POTASSIUM SERPL-MCNC: 4.8 MMOL/L — SIGNIFICANT CHANGE UP (ref 3.5–5)
POTASSIUM SERPL-SCNC: 4.8 MMOL/L — SIGNIFICANT CHANGE UP (ref 3.5–5)
RBC # BLD: 4.6 M/UL — LOW (ref 4.7–6.1)
RBC # FLD: 12.7 % — SIGNIFICANT CHANGE UP (ref 11.5–14.5)
SODIUM SERPL-SCNC: 135 MMOL/L — SIGNIFICANT CHANGE UP (ref 135–146)
WBC # BLD: 5.47 K/UL — SIGNIFICANT CHANGE UP (ref 4.8–10.8)
WBC # FLD AUTO: 5.47 K/UL — SIGNIFICANT CHANGE UP (ref 4.8–10.8)

## 2023-08-10 PROCEDURE — 99232 SBSQ HOSP IP/OBS MODERATE 35: CPT

## 2023-08-10 RX ORDER — INSULIN LISPRO 100/ML
VIAL (ML) SUBCUTANEOUS
Refills: 0 | Status: DISCONTINUED | OUTPATIENT
Start: 2023-08-10 | End: 2023-08-15

## 2023-08-10 RX ORDER — LISINOPRIL 2.5 MG/1
40 TABLET ORAL DAILY
Refills: 0 | Status: DISCONTINUED | OUTPATIENT
Start: 2023-08-10 | End: 2023-08-15

## 2023-08-10 RX ORDER — CHLORHEXIDINE GLUCONATE 213 G/1000ML
1 SOLUTION TOPICAL
Refills: 0 | Status: DISCONTINUED | OUTPATIENT
Start: 2023-08-10 | End: 2023-08-15

## 2023-08-10 RX ADMIN — LISINOPRIL 40 MILLIGRAM(S): 2.5 TABLET ORAL at 09:03

## 2023-08-10 RX ADMIN — ATORVASTATIN CALCIUM 10 MILLIGRAM(S): 80 TABLET, FILM COATED ORAL at 22:07

## 2023-08-10 RX ADMIN — INSULIN GLARGINE 20 UNIT(S): 100 INJECTION, SOLUTION SUBCUTANEOUS at 22:07

## 2023-08-10 RX ADMIN — ENOXAPARIN SODIUM 40 MILLIGRAM(S): 100 INJECTION SUBCUTANEOUS at 11:55

## 2023-08-10 RX ADMIN — Medication 2: at 11:56

## 2023-08-10 RX ADMIN — Medication 81 MILLIGRAM(S): at 11:56

## 2023-08-10 NOTE — PROGRESS NOTE ADULT - SUBJECTIVE AND OBJECTIVE BOX
ARLEN CALDERON  68y Male    INTERVAL HPI/OVERNIGHT EVENTS:    pt feels well - no complaints  no fever  wants an ice cream sandwich  ate breakfast    T(F): 97.2 (08-10-23 @ 05:27), Max: 97.9 (08-09-23 @ 22:35)  HR: 78 (08-10-23 @ 07:42) (77 - 83)  BP: 184/87 (08-10-23 @ 07:42) (156/74 - 184/87)  RR: 18 (08-10-23 @ 05:27) (18 - 18)  SpO2: 98% (08-09-23 @ 22:35) (98% - 98%) on RA    POCT Blood Glucose.: 221 mg/dL (10 Aug 2023 11:12)  POCT Blood Glucose.: 153 mg/dL (10 Aug 2023 07:32)  POCT Blood Glucose.: 115 mg/dL (09 Aug 2023 23:15)  POCT Blood Glucose.: 152 mg/dL (09 Aug 2023 21:36)  POCT Blood Glucose.: 177 mg/dL (09 Aug 2023 18:20)      PHYSICAL EXAM:  GENERAL: NAD  HEAD:  Normocephalic  EYES:  conjunctiva and sclera clear  ENMT: Moist mucous membranes, edentulous  NERVOUS SYSTEM:  Alert, awake, Good concentration, moves all extremities, oriented to person, place and time (knew the year but not the month)  CHEST/LUNG: CTA b/l  HEART: Regular rate and rhythm  ABDOMEN: Soft, Nontender, Nondistended; Bowel sounds present  EXTREMITIES:   No edema  SKIN: warm, dry    Consultant(s) Notes Reviewed:  [x ] YES  [ ] NO  Care Discussed with Consultants/Other Providers [ x] YES  [ ] NO    MEDICATIONS  (STANDING):  aspirin enteric coated 81 milliGRAM(s) Oral daily  atorvastatin 10 milliGRAM(s) Oral at bedtime  chlorhexidine 2% Cloths 1 Application(s) Topical <User Schedule>  dextrose 5%. 1000 milliLiter(s) (100 mL/Hr) IV Continuous <Continuous>  dextrose 5%. 1000 milliLiter(s) (100 mL/Hr) IV Continuous <Continuous>  dextrose 5%. 1000 milliLiter(s) (50 mL/Hr) IV Continuous <Continuous>  dextrose 5%. 1000 milliLiter(s) (50 mL/Hr) IV Continuous <Continuous>  dextrose 50% Injectable 12.5 Gram(s) IV Push once  dextrose 50% Injectable 25 Gram(s) IV Push once  dextrose 50% Injectable 25 Gram(s) IV Push once  enoxaparin Injectable 40 milliGRAM(s) SubCutaneous every 24 hours  glucagon  Injectable 1 milliGRAM(s) IntraMuscular once  glucagon  Injectable 1 milliGRAM(s) IntraMuscular once  insulin glargine Injectable (LANTUS) 20 Unit(s) SubCutaneous at bedtime  insulin lispro (ADMELOG) corrective regimen sliding scale   SubCutaneous three times a day before meals  lisinopril 40 milliGRAM(s) Oral daily    MEDICATIONS  (PRN):  dextrose Oral Gel 15 Gram(s) Oral once PRN Blood Glucose LESS THAN 70 milliGRAM(s)/deciliter      LABS:                        14.1   5.47  )-----------( 309      ( 10 Aug 2023 07:22 )             41.6     08-10    135  |  96<L>  |  19  ----------------------------<  161<H>  4.8   |  28  |  0.6<L>    Ca    9.5      10 Aug 2023 07:22  Mg     1.8     08-10            RADIOLOGY & ADDITIONAL TESTS:    Imaging or report Personally Reviewed:  [x ] YES  [ ] NO    < from: CT Head No Cont (08.08.23 @ 09:47) >  IMPRESSION:    No acute intracranial pathology.    Stable mild chronic microvascular ischemic changes.    < end of copied text >      < from: Xray Chest 1 View AP/PA (08.08.23 @ 09:30) >  Impression:    No radiographic evidence of acute cardiopulmonary disease.    < end of copied text >      < from: CT Head No Cont (08.07.23 @ 11:37) >  IMPRESSION:    Motion limited study demonstrating no gross evidence of acute   intracranial pathology.      < end of copied text >      Case discussed with resident and RN today    Care discussed with pt

## 2023-08-10 NOTE — DIETITIAN INITIAL EVALUATION ADULT - OTHER CALCULATIONS
Using ABW: ENERGY: 3823-3008 kcal/day (MSJ 1466.795* 1-1.3 SF); PROTEIN:  g/day (1.2-1.5 g/kg); FLUID: 5639-5544 mL/day (25-30 mL/kg) -- with consideration for age, BMI, malnutrition

## 2023-08-10 NOTE — PROGRESS NOTE ADULT - ASSESSMENT
Patient is a 69 yo M w/PMH of DM, HTN, schizophrenia, and recent admission last month for AMS likely 2/2 metabolic encephalopathy due to hypoglycemia and was started on thiamine and aspirin, who presents for altered mental status with weakness and not answering questions, presented a day prior to the ED with same chief complaint w/negative work up, is VSS at current ED visit, exam showing AxO1 and no focal neurologic deficit, labs unremarkable except for Mg 1.5, and CXR and CT Head unremarkable, now admitted for further work up and management of AMS.    #Altered Mental Status, suspected toxic metabolic encephalopathy  *Head CT (8/8): No acute intracranial pathology  - Has improved on neuro exam yesterday with AxO3 and no focal neurologic deficits  - Denies any drug use  - Alcohol level < 10  - Neurology consult note: Less likely neurologic cause, recommended EEG and MR Head wo contrast  - EEG done (8/8), pending results  - Pending brain MRI  - Urine drug screen, urinalysis and urine culture pending collection  - HOLDING insulin due to likely hypoglycemia encephalopathy last month    #DM II   - A1C 8.1  - Monitor FS  - Lantus 20 u nightly    #HTN  /78  - on lisinopril 40 mg at home, not started here    #ASCVD risk prevention  - c/w aspirin 81 mg, unclear why patient is taking aspirin  - c/w home atorvastatin 10 mg    #MISC  - DVT PPx: Lovenox  - Diet: Minced and moist  - CHG: Chlorhexidine wiptes  - Activity: Ambulate as tolerated  - Dispo: From [facility]   - Code: Full, no record of MOLST or GOC on chart   Patient is a 67 yo M w/PMH of DM, HTN, schizophrenia, and recent admission last month for AMS likely 2/2 metabolic encephalopathy due to hypoglycemia and was started on thiamine and aspirin, who presents for altered mental status with weakness and not answering questions, presented a day prior to the ED with same chief complaint w/negative work up, is VSS at current ED visit, exam showing AxO1 and no focal neurologic deficit, labs unremarkable except for Mg 1.5, and CXR and CT Head unremarkable, now admitted for further work up and management of AMS.    #Altered Mental Status, suspected toxic metabolic encephalopathy  *Head CT (8/8): No acute intracranial pathology  - Has improved on neuro exam yesterday with AxO3 and no focal neurologic deficits  - Denies any drug use  - Alcohol level < 10  - Neurology consult note: Less likely neurologic cause, recommended EEG and MR Head w/wo contrast  - EEG done (8/8), pending results  - Pending brain MRI  - Urine drug screen, urinalysis and urine culture pending collection  - HOLDING insulin due to likely hypoglycemia encephalopathy last month    #DM II   - A1C 8.1  - Monitor FS  - Lantus 20 u nightly    #HTN  /78  - on lisinopril 40 mg at home, not started here    #ASCVD risk prevention  - c/w aspirin 81 mg, unclear why patient is taking aspirin  - c/w home atorvastatin 10 mg    #MISC  - DVT PPx: Lovenox  - Diet: Minced and moist  - CHG: Chlorhexidine wiptes  - Activity: Ambulate as tolerated  - Dispo: From [facility]   - Code: Full, no record of MOLST or GOC on chart   Patient is a 69 yo M w/PMH of DM, HTN, schizophrenia, and recent admission last month for AMS likely 2/2 metabolic encephalopathy due to hypoglycemia and was started on thiamine and aspirin, who presents for altered mental status with weakness and not answering questions, presented a day prior to the ED with same chief complaint w/negative work up, is VSS at current ED visit, exam showing AxO1 and no focal neurologic deficit, labs unremarkable except for Mg 1.5, and CXR and CT Head unremarkable, now admitted for further work up and management of AMS.    #Altered Mental Status, suspected toxic metabolic encephalopathy  *Head CT (8/8): No acute intracranial pathology  - Has improved on neuro exam yesterday with AxO3 and no focal neurologic deficits  - Denies any drug use  - Alcohol level < 10  - Neurology consult note: Less likely neurologic cause, recommended EEG and MR Head w/wo contrast  - EEG done (8/8), pending results  - Pending brain MRI  - Urine drug screen, urinalysis and urine culture pending collection  - HOLDING insulin due to likely hypoglycemia encephalopathy last month    #DM II   - A1C 8.1  - Monitor FS  - Lantus 20 u nightly    #HTN  /78  - c/w home lisinopril 40 mg    #ASCVD risk prevention  - c/w aspirin 81 mg, unclear why patient is on aspirin  - c/w home atorvastatin 10 mg    #Thiamine deficiency?  - not currently on thiamine, was discharged on it a month ago    #MISC  - DVT PPx: Lovenox  - Diet: Minced and moist  - CHG: Chlorhexidine wiptes  - Activity: Ambulate as tolerated  - Dispo: From [facility]   - Code: Full, no record of MOLST or GOC on chart

## 2023-08-10 NOTE — DIETITIAN INITIAL EVALUATION ADULT - PERTINENT MEDS FT
Pulmonary edema MEDICATIONS  (STANDING):  aspirin enteric coated 81 milliGRAM(s) Oral daily  atorvastatin 10 milliGRAM(s) Oral at bedtime  chlorhexidine 2% Cloths 1 Application(s) Topical <User Schedule>  dextrose 5%. 1000 milliLiter(s) (50 mL/Hr) IV Continuous <Continuous>  dextrose 5%. 1000 milliLiter(s) (100 mL/Hr) IV Continuous <Continuous>  dextrose 5%. 1000 milliLiter(s) (100 mL/Hr) IV Continuous <Continuous>  dextrose 5%. 1000 milliLiter(s) (50 mL/Hr) IV Continuous <Continuous>  dextrose 50% Injectable 25 Gram(s) IV Push once  dextrose 50% Injectable 12.5 Gram(s) IV Push once  dextrose 50% Injectable 25 Gram(s) IV Push once  enoxaparin Injectable 40 milliGRAM(s) SubCutaneous every 24 hours  glucagon  Injectable 1 milliGRAM(s) IntraMuscular once  glucagon  Injectable 1 milliGRAM(s) IntraMuscular once  insulin glargine Injectable (LANTUS) 20 Unit(s) SubCutaneous at bedtime  insulin lispro (ADMELOG) corrective regimen sliding scale   SubCutaneous three times a day before meals  lisinopril 40 milliGRAM(s) Oral daily    MEDICATIONS  (PRN):  dextrose Oral Gel 15 Gram(s) Oral once PRN Blood Glucose LESS THAN 70 milliGRAM(s)/deciliter

## 2023-08-10 NOTE — DIETITIAN INITIAL EVALUATION ADULT - PERTINENT LABORATORY DATA
08-10    135  |  96<L>  |  19  ----------------------------<  161<H>  4.8   |  28  |  0.6<L>    Ca    9.5      10 Aug 2023 07:22  Mg     1.8     08-10    POCT Blood Glucose.: 221 mg/dL (08-10-23 @ 11:12)  A1C with Estimated Average Glucose Result: 8.1 % (07-04-23 @ 06:25)  A1C with Estimated Average Glucose Result: 8.1 % (07-03-23 @ 11:48)

## 2023-08-10 NOTE — DIETITIAN NUTRITION RISK NOTIFICATION - TREATMENT: THE FOLLOWING DIET HAS BEEN RECOMMENDED
Diet, Minced and Moist:   Consistent Carbohydrate {Evening Snack}  DASH/TLC {Sodium & Cholesterol Restricted}  Supplement Feeding Modality:  Oral  Glucerna Shake Cans or Servings Per Day:  3       Frequency:  Daily (08-10-23 @ 12:51) [Pending Verification By Attending]  Diet, Minced and Moist (08-08-23 @ 21:14) [Active]

## 2023-08-10 NOTE — DIETITIAN INITIAL EVALUATION ADULT - OTHER INFO
Pertinent Medical Information: Per H&P, pt is a 68-year-old male with PMHx of DM, HTN, schizophrenia, and recent admission last month for AMS presents for altered mental status from [facility] assisted living.     Pertinent Subjective Information: Pt reports good appetite; consuming >75% of meals?? Will need to confirm with RN.     Weight hx: Pt does not recall UBW. Current dosing weight is 68 KG. Previous admission weight was  Pertinent Medical Information: Per H&P, pt is a 68-year-old male with PMHx of DM, HTN, schizophrenia, and recent admission last month for AMS presents for altered mental status from [facility] assisted living.     Pertinent Subjective Information: Pt reports good appetite; consuming >75% of meals?? Will need to confirm with RN.     Weight hx: Pt does not recall UBW. Current dosing weight is 68 KG. Previous admission weight was 61.2 KG on 7/2/23; 10% unintentional weight gain in over 1 month compared to current dosing weight. Pt does not meet weight criteria for malnutrition at this time.

## 2023-08-10 NOTE — DIETITIAN INITIAL EVALUATION ADULT - ADD RECOMMEND
1. ADD Glucerna Shake 3X/DAILY to optimize kcal/pro intake -- provides 660 kcal/day total, 30g pro/day total  2. Recommend to add consistent carbohydrate (evening snack) and DASH/TLC modifiers to diet - dx DM, HTN  3. Encourage PO intake and assist during meals prn    Pt is at high nutrition risk; will f/u in 3 days or prn.

## 2023-08-10 NOTE — PROGRESS NOTE ADULT - ASSESSMENT
69 y/o man with PMH of HTN, DM II, Schizophrenia and recent admission last month for AMS presented for altered mental status from [facility] assisted living.    1. Altered Mental status  Improved - pt is AA&Ox 2 today and knows , ate breakfast and asking for ice cream sandwich.   Head CT showed no acute intracranial pathology  Alcohol level<10  f/u UA and urine toxicology  CXR NAPD   Neurology consult appreciated - less likely neurological cause but recommended EEG (f/u report) and brain MRI, pending.   He was admitted for similar reason few months ago and found with hypoglycemia. DM is now better controlled     2. DM type II   controlled on lantus 20 Units SQ daily  monitor FS    3. HTN - lisinopril restarted today and monitor BP    4. h/o Schizophrenia - pt is not demonstrating psychosis and is calm and cooperative with staff  clarify if on any meds  no need for behavioral health eval at this time    5. DVT ppx: Lovenox   OOB and ambulate with staff    full code      PROGRESS NOTE HANDOFF    Pending: MRI of brain, EEG report, UA, urine drug screen    Disposition: [facility] residence

## 2023-08-10 NOTE — PROGRESS NOTE ADULT - SUBJECTIVE AND OBJECTIVE BOX
----------Daily Progress Note----------    HISTORY OF PRESENT ILLNESS:  Patient is a 67 yo M w/PMH of DM, HTN, schizophrenia, and recent admission last month for AMS likely 2/2 metabolic encephalopathy due to hypoglycemia and was started on thiamine and aspirin, who presents for altered mental status with weakness and not answering questions, presented a day prior to the ED with same chief complaint w/negative work up, is VSS at current ED visit, exam showing AxO1 and no focal neurologic deficit, labs notable for lactate 2.6 -> 1.2 and Mg 1.5, and CXR and CT Head unremarkable, now admitted for further work up and management of AMS.     Today is hospital day 2d.     ED course:  68-year-old male with a PMH of DM, HTN, schizophrenia, and recent admission last month for AMS presents for altered mental status from [facility] assisted living. Staff from [facility] state that patient seems weak and when asked questions will not answer.  Patient was here yesterday for the same complaint twice in the ED and workup was negative. Patient was admitted one month ago for AMS likely metabolic encephalopathy possibly due to hypoglycemia, was a stroke code for unresponsiveness and seen by neurology at that time, negative rEEG and started on Thiamine therapy and Aspirin 81mg. When interviewed patient is alert, pleasant, and cooperative, neuro exam no focal deficit just generalize weakness, AAOx1 (baseline). Denies chest pain, abdominal pain, fevers, chills, diarrhea, nausea or vomiting.    In the ED VS were /72, HR 92, RR 18, temp 98.1 lab workup was at baseline except for hypomagnesemia(1.5) that was repleted. CTH and chest xray were negative.  Was seen by neurology that recommended rEEG and MR brain.     INTERVAL HOSPITAL COURSE / OVERNIGHT EVENTS:  O/N event:      24 hr event:      Patient was examined and seen at bedside.    Review of Systems: Otherwise unremarkable     <<<<<PAST MEDICAL & SURGICAL HISTORY>>>>>  Diabetes    Schizo-affective schizophrenia    No significant past surgical history    No significant past surgical history      ALLERGIES  No Known Allergies      Home Medications:  Haldol 1 mg oral tablet: 1 tab(s) orally once a day (09 Aug 2023 18:14)  Lantus 100 units/mL subcutaneous solution: 40 unit(s) subcutaneous once a day (at bedtime) (09 Aug 2023 18:14)  Lipitor 10 mg oral tablet: 1 tab(s) orally once a day (09 Aug 2023 18:14)  Prinivil 40 mg oral tablet: 1 tab(s) orally once a day (09 Aug 2023 18:14)        MEDICATIONS  STANDING MEDICATIONS  aspirin enteric coated 81 milliGRAM(s) Oral daily  atorvastatin 10 milliGRAM(s) Oral at bedtime  dextrose 5%. 1000 milliLiter(s) IV Continuous <Continuous>  dextrose 5%. 1000 milliLiter(s) IV Continuous <Continuous>  dextrose 5%. 1000 milliLiter(s) IV Continuous <Continuous>  dextrose 5%. 1000 milliLiter(s) IV Continuous <Continuous>  dextrose 50% Injectable 12.5 Gram(s) IV Push once  dextrose 50% Injectable 25 Gram(s) IV Push once  dextrose 50% Injectable 25 Gram(s) IV Push once  enoxaparin Injectable 40 milliGRAM(s) SubCutaneous every 24 hours  glucagon  Injectable 1 milliGRAM(s) IntraMuscular once  glucagon  Injectable 1 milliGRAM(s) IntraMuscular once  insulin glargine Injectable (LANTUS) 20 Unit(s) SubCutaneous at bedtime    PRN MEDICATIONS  dextrose Oral Gel 15 Gram(s) Oral once PRN    VITALS:  T(F): 97.9  HR: 78  BP: 169/78  RR: 18  SpO2: 98%    <<<<<PHYSICAL EXAM>>>>>  GENERAL: Well developed, well nourished and in no acute distress. Resting comfortably in bed.  HEENT: Normocephalic, atraumatic, mucous membranes moist, EOMI, PERRLA, bilateral sclera anicteric, no conjunctival injection  Neck: Supple, non-tender, no lymphadenopathy.  PULMONARY: Clear to auscultation bilaterally. No rales, rhonchi, or wheezing.  CARDIOVASCULAR: Regular rate and rhythm, S1-S2, no murmurs  GASTROINTESTINAL: Soft, non-tender, non-distended, no guarding.  RENAL: No CVA tenderness.  SKIN/EXTREMITIES: No clubbing or edema  NEUROLOGIC/MUSCULOSKELETAL: AOx4, grossly moving all extremities, no focal deficits.      <<<<<LABS>>>>>                        13.1   7.52  )-----------( 284      ( 08 Aug 2023 09:40 )             38.6     08-08    135  |  97<L>  |  8<L>  ----------------------------<  173<H>  4.2   |  29  |  0.5<L>    Ca    9.2      08 Aug 2023 09:40  Mg     1.5     08-08    TPro  6.3  /  Alb  4.1  /  TBili  0.4  /  DBili  x   /  AST  14  /  ALT  10  /  AlkPhos  106  08-08      Urinalysis Basic - ( 08 Aug 2023 09:40 )    Color: x / Appearance: x / SG: x / pH: x  Gluc: 173 mg/dL / Ketone: x  / Bili: x / Urobili: x   Blood: x / Protein: x / Nitrite: x   Leuk Esterase: x / RBC: x / WBC x   Sq Epi: x / Non Sq Epi: x / Bacteria: x          [id number]        <<<<<RADIOLOGY>>>>>  CT Head (8/8): No acute intracranial pathology. Stable mild chronic microvascular ischemic changes.      CXR (8/8): No radiographic evidence of acute cardiopulmonary disease.    CT Head (8/7): Motion limited study demonstrating no gross evidence of acute intracranial pathology.      CXR (8/7): No radiographic evidence of acute cardiopulmonary disease.    ----------------------------------------------------------------------------------------------------------------------------------------------------------------------------------------------- ----------Daily Progress Note----------    HISTORY OF PRESENT ILLNESS:  Patient is a 69 yo M w/PMH of DM, HTN, schizophrenia, and recent admission last month for AMS likely 2/2 metabolic encephalopathy due to hypoglycemia and was started on thiamine and aspirin, who presents for altered mental status with weakness and not answering questions, presented a day prior to the ED with same chief complaint w/negative work up, is VSS at current ED visit, exam showing AxO1 and no focal neurologic deficit, labs notable for lactate 2.6 -> 1.2 and Mg 1.5, and CXR and CT Head unremarkable, now admitted for further work up and management of AMS.     Today is hospital day 2d.     ED course:  68-year-old male with a PMH of DM, HTN, schizophrenia, and recent admission last month for AMS presents for altered mental status from [facility] assisted [facility]. Staff from [facility] state that patient seems weak and when asked questions will not answer.  Patient was here yesterday for the same complaint twice in the ED and workup was negative. Patient was admitted one month ago for AMS likely metabolic encephalopathy possibly due to hypoglycemia, was a stroke code for unresponsiveness and seen by neurology at that time, negative rEEG and started on Thiamine therapy and Aspirin 81mg. When interviewed patient is alert, pleasant, and cooperative, neuro exam no focal deficit just generalize weakness, AAOx1 (baseline). Denies chest pain, abdominal pain, fevers, chills, diarrhea, nausea or vomiting.    In the ED VS were /72, HR 92, RR 18, temp 98.1 lab workup was at baseline except for hypomagnesemia(1.5) that was repleted. CTH and chest xray were negative.  Was seen by neurology that recommended rEEG and MR brain.     INTERVAL HOSPITAL COURSE / OVERNIGHT EVENTS:  O/N event:  None    24 hr event:  None    Patient was examined and seen at bedside. Patient is currently alert and oriented to person, place, and only year but not day or month, denies any pain or other symptoms, does not answer questions properly, is a poor historian    Review of Systems: Otherwise unremarkable     <<<<<PAST MEDICAL & SURGICAL HISTORY>>>>>  Diabetes    Schizo-affective schizophrenia    No significant past surgical history    No significant past surgical history      ALLERGIES  No Known Allergies      Home Medications:  Haldol 1 mg oral tablet: 1 tab(s) orally once a day (09 Aug 2023 18:14)  Lantus 100 units/mL subcutaneous solution: 40 unit(s) subcutaneous once a day (at bedtime) (09 Aug 2023 18:14)  Lipitor 10 mg oral tablet: 1 tab(s) orally once a day (09 Aug 2023 18:14)  Prinivil 40 mg oral tablet: 1 tab(s) orally once a day (09 Aug 2023 18:14)        MEDICATIONS  STANDING MEDICATIONS  aspirin enteric coated 81 milliGRAM(s) Oral daily  atorvastatin 10 milliGRAM(s) Oral at bedtime  dextrose 5%. 1000 milliLiter(s) IV Continuous <Continuous>  dextrose 5%. 1000 milliLiter(s) IV Continuous <Continuous>  dextrose 5%. 1000 milliLiter(s) IV Continuous <Continuous>  dextrose 5%. 1000 milliLiter(s) IV Continuous <Continuous>  dextrose 50% Injectable 12.5 Gram(s) IV Push once  dextrose 50% Injectable 25 Gram(s) IV Push once  dextrose 50% Injectable 25 Gram(s) IV Push once  enoxaparin Injectable 40 milliGRAM(s) SubCutaneous every 24 hours  glucagon  Injectable 1 milliGRAM(s) IntraMuscular once  glucagon  Injectable 1 milliGRAM(s) IntraMuscular once  insulin glargine Injectable (LANTUS) 20 Unit(s) SubCutaneous at bedtime    PRN MEDICATIONS  dextrose Oral Gel 15 Gram(s) Oral once PRN    VITALS:  T(F): 97.9  HR: 78  BP: 169/78  RR: 18  SpO2: 98%    <<<<<PHYSICAL EXAM>>>>>  GENERAL: Well developed, well nourished and in no acute distress. Resting comfortably in bed.  HEENT: Normocephalic, atraumatic, mucous membranes moist, EOMI, PERRLA, bilateral sclera anicteric, no conjunctival injection  Neck: Supple, non-tender, no lymphadenopathy.  PULMONARY: Clear to auscultation bilaterally. No rales, rhonchi, or wheezing.  CARDIOVASCULAR: Regular rate and rhythm, S1-S2, no murmurs  GASTROINTESTINAL: Soft, non-tender, non-distended, no guarding.  RENAL: No CVA tenderness.  SKIN/EXTREMITIES: No clubbing or edema  NEUROLOGIC/MUSCULOSKELETAL: AOx4, grossly moving all extremities, no focal deficits.      <<<<<LABS>>>>>                        13.1   7.52  )-----------( 284      ( 08 Aug 2023 09:40 )             38.6     08-08    135  |  97<L>  |  8<L>  ----------------------------<  173<H>  4.2   |  29  |  0.5<L>    Ca    9.2      08 Aug 2023 09:40  Mg     1.5     08-08    TPro  6.3  /  Alb  4.1  /  TBili  0.4  /  DBili  x   /  AST  14  /  ALT  10  /  AlkPhos  106  08-08      Urinalysis Basic - ( 08 Aug 2023 09:40 )    Color: x / Appearance: x / SG: x / pH: x  Gluc: 173 mg/dL / Ketone: x  / Bili: x / Urobili: x   Blood: x / Protein: x / Nitrite: x   Leuk Esterase: x / RBC: x / WBC x   Sq Epi: x / Non Sq Epi: x / Bacteria: x          [id number]        <<<<<RADIOLOGY>>>>>  CT Head (8/8): No acute intracranial pathology. Stable mild chronic microvascular ischemic changes.      CXR (8/8): No radiographic evidence of acute cardiopulmonary disease.    CT Head (8/7): Motion limited study demonstrating no gross evidence of acute intracranial pathology.      CXR (8/7): No radiographic evidence of acute cardiopulmonary disease.    ----------------------------------------------------------------------------------------------------------------------------------------------------------------------------------------------- ----------Daily Progress Note----------    HISTORY OF PRESENT ILLNESS:  Patient is a 69 yo M w/PMH of DM, HTN, schizophrenia, and recent admission last month for AMS likely 2/2 metabolic encephalopathy due to hypoglycemia and was started on thiamine and aspirin, who presents for altered mental status with weakness and not answering questions, presented a day prior to the ED with same chief complaint w/negative work up, is VSS at current ED visit, exam showing AxO1 and no focal neurologic deficit, labs notable for lactate 2.6 -> 1.2 and Mg 1.5, and CXR and CT Head unremarkable, now admitted for further work up and management of AMS.     Today is hospital day 2d.     ED course:  68-year-old male with a PMH of DM, HTN, schizophrenia, and recent admission last month for AMS presents for altered mental status from [facility] assisted living. Staff from [facility] state that patient seems weak and when asked questions will not answer.  Patient was here yesterday for the same complaint twice in the ED and workup was negative. Patient was admitted one month ago for AMS likely metabolic encephalopathy possibly due to hypoglycemia, was a stroke code for unresponsiveness and seen by neurology at that time, negative rEEG and started on Thiamine therapy and Aspirin 81mg. When interviewed patient is alert, pleasant, and cooperative, neuro exam no focal deficit just generalize weakness, AAOx1 (baseline). Denies chest pain, abdominal pain, fevers, chills, diarrhea, nausea or vomiting.    In the ED VS were /72, HR 92, RR 18, temp 98.1 lab workup was at baseline except for hypomagnesemia(1.5) that was repleted. CTH and chest xray were negative.  Was seen by neurology that recommended rEEG and MR brain.     INTERVAL HOSPITAL COURSE / OVERNIGHT EVENTS:  O/N event:  None    24 hr event:  None    Patient was examined and seen at bedside. Patient is currently alert and oriented to person, place, and only year but not day or month, denies any pain or other symptoms, does not answer questions properly, is a poor historian    Review of Systems: Otherwise unremarkable     <<<<<PAST MEDICAL & SURGICAL HISTORY>>>>>  Diabetes    Schizo-affective schizophrenia    No significant past surgical history    No significant past surgical history      ALLERGIES  No Known Allergies      Home Medications:  Haldol 1 mg oral tablet: 1 tab(s) orally once a day (09 Aug 2023 18:14)  Lantus 100 units/mL subcutaneous solution: 40 unit(s) subcutaneous once a day (at bedtime) (09 Aug 2023 18:14)  Lipitor 10 mg oral tablet: 1 tab(s) orally once a day (09 Aug 2023 18:14)  Prinivil 40 mg oral tablet: 1 tab(s) orally once a day (09 Aug 2023 18:14)        MEDICATIONS  STANDING MEDICATIONS  aspirin enteric coated 81 milliGRAM(s) Oral daily  atorvastatin 10 milliGRAM(s) Oral at bedtime  dextrose 5%. 1000 milliLiter(s) IV Continuous <Continuous>  dextrose 5%. 1000 milliLiter(s) IV Continuous <Continuous>  dextrose 5%. 1000 milliLiter(s) IV Continuous <Continuous>  dextrose 5%. 1000 milliLiter(s) IV Continuous <Continuous>  dextrose 50% Injectable 12.5 Gram(s) IV Push once  dextrose 50% Injectable 25 Gram(s) IV Push once  dextrose 50% Injectable 25 Gram(s) IV Push once  enoxaparin Injectable 40 milliGRAM(s) SubCutaneous every 24 hours  glucagon  Injectable 1 milliGRAM(s) IntraMuscular once  glucagon  Injectable 1 milliGRAM(s) IntraMuscular once  insulin glargine Injectable (LANTUS) 20 Unit(s) SubCutaneous at bedtime    PRN MEDICATIONS  dextrose Oral Gel 15 Gram(s) Oral once PRN    VITALS:  T(F): 97.9  HR: 78  BP: 169/78  RR: 18  SpO2: 98%    <<<<<PHYSICAL EXAM>>>>>  GENERAL: Elderly cachetic appearing man laying in bed in no apparent distress  PULMONARY: Clear to auscultation bilaterally. No rales, rhonchi, or wheezing.  CARDIOVASCULAR: RRR, no M/R/G, S1/S2+  GASTROINTESTINAL: Soft, non-tender, non-distended, no guarding.  SKIN/EXTREMITIES: Warm, 2+ tibialis posterior pulse, no UE or LE edema  NEUROLOGIC/MUSCULOSKELETAL: AOx2, grossly moving all extremities, no focal deficits.    <<<<<LABS>>>>>                        13.1   7.52  )-----------( 284      ( 08 Aug 2023 09:40 )             38.6     08-08    135  |  97<L>  |  8<L>  ----------------------------<  173<H>  4.2   |  29  |  0.5<L>    Ca    9.2      08 Aug 2023 09:40  Mg     1.5     08-08    TPro  6.3  /  Alb  4.1  /  TBili  0.4  /  DBili  x   /  AST  14  /  ALT  10  /  AlkPhos  106  08-08      Urinalysis Basic - ( 08 Aug 2023 09:40 )    Color: x / Appearance: x / SG: x / pH: x  Gluc: 173 mg/dL / Ketone: x  / Bili: x / Urobili: x   Blood: x / Protein: x / Nitrite: x   Leuk Esterase: x / RBC: x / WBC x   Sq Epi: x / Non Sq Epi: x / Bacteria: x          [id number]        <<<<<RADIOLOGY>>>>>  CT Head (8/8): No acute intracranial pathology. Stable mild chronic microvascular ischemic changes.      CXR (8/8): No radiographic evidence of acute cardiopulmonary disease.    CT Head (8/7): Motion limited study demonstrating no gross evidence of acute intracranial pathology.      CXR (8/7): No radiographic evidence of acute cardiopulmonary disease.    -----------------------------------------------------------------------------------------------------------------------------------------------------------------------------------------------

## 2023-08-10 NOTE — DIETITIAN INITIAL EVALUATION ADULT - ORAL INTAKE PTA/DIET HISTORY
RD attempted nutrition assessment interview this morning. Pt unable to answer many nutrition hx questions. Per flow sheet, pt is alert and oriented. Pt reports good appetite prior to admit; consumed 3 meals daily. Does not recall vitamin/mineral supplement intake, UBW, allergies and diet pt followed prior to admit. Will attempt to obtain hx at follow up. Hx limited to medical chart at this time. RD attempted nutrition assessment interview this morning. Pt unable to answer many nutrition hx questions. Per flow sheet, pt is alert and oriented, however pt admitted for AMS. Pt reports good appetite prior to admit; consumed 3 meals daily. Does not recall vitamin/mineral supplement intake, UBW, allergies and diet pt followed prior to admit. Will attempt to obtain hx at follow up. Hx limited to medical chart at this time.

## 2023-08-10 NOTE — DIETITIAN INITIAL EVALUATION ADULT - NAME AND PHONE
Kendra [phone] or TEAMS    Nutrition Interventions: Meals, snacks, medical nutrition supplements; Nutrition Monitoring: Diet order, PO intake, weights, labs, NFPF, body composition, BM and tolerance to medical food supplements

## 2023-08-11 LAB
ANION GAP SERPL CALC-SCNC: 11 MMOL/L — SIGNIFICANT CHANGE UP (ref 7–14)
APPEARANCE UR: ABNORMAL
BACTERIA # UR AUTO: NEGATIVE /HPF — SIGNIFICANT CHANGE UP
BILIRUB UR-MCNC: NEGATIVE — SIGNIFICANT CHANGE UP
BUN SERPL-MCNC: 16 MG/DL — SIGNIFICANT CHANGE UP (ref 10–20)
CALCIUM SERPL-MCNC: 9.9 MG/DL — SIGNIFICANT CHANGE UP (ref 8.4–10.5)
CAST: 0 /LPF — SIGNIFICANT CHANGE UP (ref 0–4)
CHLORIDE SERPL-SCNC: 95 MMOL/L — LOW (ref 98–110)
CO2 SERPL-SCNC: 28 MMOL/L — SIGNIFICANT CHANGE UP (ref 17–32)
COLOR SPEC: YELLOW — SIGNIFICANT CHANGE UP
CREAT SERPL-MCNC: 0.5 MG/DL — LOW (ref 0.7–1.5)
DIFF PNL FLD: NEGATIVE — SIGNIFICANT CHANGE UP
DRUG SCREEN 1, URINE RESULT: SIGNIFICANT CHANGE UP
EGFR: 110 ML/MIN/1.73M2 — SIGNIFICANT CHANGE UP
GLUCOSE BLDC GLUCOMTR-MCNC: 122 MG/DL — HIGH (ref 70–99)
GLUCOSE BLDC GLUCOMTR-MCNC: 132 MG/DL — HIGH (ref 70–99)
GLUCOSE BLDC GLUCOMTR-MCNC: 187 MG/DL — HIGH (ref 70–99)
GLUCOSE BLDC GLUCOMTR-MCNC: 229 MG/DL — HIGH (ref 70–99)
GLUCOSE SERPL-MCNC: 127 MG/DL — HIGH (ref 70–99)
GLUCOSE UR QL: 250 MG/DL
HCT VFR BLD CALC: 42.4 % — SIGNIFICANT CHANGE UP (ref 42–52)
HGB BLD-MCNC: 14.3 G/DL — SIGNIFICANT CHANGE UP (ref 14–18)
KETONES UR-MCNC: NEGATIVE MG/DL — SIGNIFICANT CHANGE UP
LEUKOCYTE ESTERASE UR-ACNC: NEGATIVE — SIGNIFICANT CHANGE UP
MAGNESIUM SERPL-MCNC: 1.8 MG/DL — SIGNIFICANT CHANGE UP (ref 1.8–2.4)
MCHC RBC-ENTMCNC: 30.4 PG — SIGNIFICANT CHANGE UP (ref 27–31)
MCHC RBC-ENTMCNC: 33.7 G/DL — SIGNIFICANT CHANGE UP (ref 32–37)
MCV RBC AUTO: 90.2 FL — SIGNIFICANT CHANGE UP (ref 80–94)
NITRITE UR-MCNC: NEGATIVE — SIGNIFICANT CHANGE UP
NRBC # BLD: 0 /100 WBCS — SIGNIFICANT CHANGE UP (ref 0–0)
PH UR: 6.5 — SIGNIFICANT CHANGE UP (ref 5–8)
PLATELET # BLD AUTO: 308 K/UL — SIGNIFICANT CHANGE UP (ref 130–400)
PMV BLD: 10.5 FL — HIGH (ref 7.4–10.4)
POTASSIUM SERPL-MCNC: 4.7 MMOL/L — SIGNIFICANT CHANGE UP (ref 3.5–5)
POTASSIUM SERPL-SCNC: 4.7 MMOL/L — SIGNIFICANT CHANGE UP (ref 3.5–5)
PROT UR-MCNC: 30 MG/DL
RBC # BLD: 4.7 M/UL — SIGNIFICANT CHANGE UP (ref 4.7–6.1)
RBC # FLD: 12.6 % — SIGNIFICANT CHANGE UP (ref 11.5–14.5)
RBC CASTS # UR COMP ASSIST: 1 /HPF — SIGNIFICANT CHANGE UP (ref 0–4)
SODIUM SERPL-SCNC: 134 MMOL/L — LOW (ref 135–146)
SP GR SPEC: 1.02 — SIGNIFICANT CHANGE UP (ref 1–1.03)
SQUAMOUS # UR AUTO: 0 /HPF — SIGNIFICANT CHANGE UP (ref 0–5)
UROBILINOGEN FLD QL: 1 MG/DL — SIGNIFICANT CHANGE UP (ref 0.2–1)
WBC # BLD: 6.08 K/UL — SIGNIFICANT CHANGE UP (ref 4.8–10.8)
WBC # FLD AUTO: 6.08 K/UL — SIGNIFICANT CHANGE UP (ref 4.8–10.8)
WBC UR QL: 0 /HPF — SIGNIFICANT CHANGE UP (ref 0–5)

## 2023-08-11 PROCEDURE — 70553 MRI BRAIN STEM W/O & W/DYE: CPT | Mod: 26

## 2023-08-11 PROCEDURE — 99232 SBSQ HOSP IP/OBS MODERATE 35: CPT

## 2023-08-11 RX ORDER — NIFEDIPINE 30 MG
30 TABLET, EXTENDED RELEASE 24 HR ORAL DAILY
Refills: 0 | Status: DISCONTINUED | OUTPATIENT
Start: 2023-08-11 | End: 2023-08-15

## 2023-08-11 RX ORDER — NIFEDIPINE 30 MG
1 TABLET, EXTENDED RELEASE 24 HR ORAL
Qty: 30 | Refills: 0
Start: 2023-08-11 | End: 2023-09-09

## 2023-08-11 RX ORDER — INSULIN GLARGINE 100 [IU]/ML
20 INJECTION, SOLUTION SUBCUTANEOUS
Qty: 30 | Refills: 0
Start: 2023-08-11 | End: 2023-09-09

## 2023-08-11 RX ORDER — INSULIN GLARGINE 100 [IU]/ML
40 INJECTION, SOLUTION SUBCUTANEOUS
Refills: 0 | DISCHARGE

## 2023-08-11 RX ADMIN — Medication 81 MILLIGRAM(S): at 12:26

## 2023-08-11 RX ADMIN — LISINOPRIL 40 MILLIGRAM(S): 2.5 TABLET ORAL at 05:34

## 2023-08-11 RX ADMIN — Medication 2: at 12:28

## 2023-08-11 RX ADMIN — CHLORHEXIDINE GLUCONATE 1 APPLICATION(S): 213 SOLUTION TOPICAL at 05:38

## 2023-08-11 RX ADMIN — Medication 30 MILLIGRAM(S): at 08:46

## 2023-08-11 RX ADMIN — ATORVASTATIN CALCIUM 10 MILLIGRAM(S): 80 TABLET, FILM COATED ORAL at 21:26

## 2023-08-11 RX ADMIN — INSULIN GLARGINE 20 UNIT(S): 100 INJECTION, SOLUTION SUBCUTANEOUS at 21:26

## 2023-08-11 RX ADMIN — ENOXAPARIN SODIUM 40 MILLIGRAM(S): 100 INJECTION SUBCUTANEOUS at 12:26

## 2023-08-11 NOTE — DISCHARGE NOTE PROVIDER - HOSPITAL COURSE
68-year-old male with a PMH of DM, HTN, schizophrenia, and recent admission last month for AMS presents for altered mental status from [facility] assisted living. Staff from [facility] state that patient seems weak and when asked questions will not answer.  Patient was here yesterday for the same complaint twice in the ED and workup was negative. Patient was admitted one month ago for AMS likely metabolic encephalopathy possibly due to hypoglycemia, was a stroke code for unresponsiveness and seen by neurology at that time, negative rEEG and started on Thiamine therapy and Aspirin 81mg. When interviewed patient is alert, pleasant, and cooperative, neuro exam no focal deficit just generalize weakness, AAOx1 (baseline). Denies chest pain, abdominal pain, fevers, chills, diarrhea, nausea or vomiting.    In the ED VS were /72, HR 92, RR 18, temp 98.1 lab workup was at baseline except for hypomagnesemia(1.5) that was repleted. CTH and chest xray were negative.  Was seen by neurology that recommended rEEG and MR brain.     Patient was admitted with a working diagnosis AMS. EEG, CT head, brain MR brain WNL and without acute pathology. Toxicology WNL. No metabolic cause identified. Cleared by neurology and medicine for safe discharge.    68-year-old male with a PMH of DM, HTN, schizophrenia, and recent admission last month for AMS presents for altered mental status from [facility] assisted living. Staff from [facility] state that patient seems weak and when asked questions will not answer.  Patient was here yesterday for the same complaint twice in the ED and workup was negative. Patient was admitted one month ago for AMS likely metabolic encephalopathy possibly due to hypoglycemia, was a stroke code for unresponsiveness and seen by neurology at that time, negative rEEG and started on Thiamine therapy and Aspirin 81mg. When interviewed patient is alert, pleasant, and cooperative, neuro exam no focal deficit just generalize weakness, AAOx2.5 - knows name, location, year but is slow to respond and may get confused during conversation. Denies chest pain, abdominal pain, fevers, chills, diarrhea, nausea or vomiting.    In the ED VS were /72, HR 92, RR 18, temp 98.1 lab workup was at baseline except for hypomagnesemia(1.5) that was repleted. CTH and chest xray were negative.  Was seen by neurology that recommended rEEG and MR brain which were both negative with no acute pathology    Toxicology WNL. No metabolic cause identified. Cleared by neurology and medicine for safe discharge.    68-year-old male with a PMH of DM, HTN, schizophrenia, and recent admission last month for AMS presents for altered mental status from [facility] assisted living. Staff from [facility] state that patient seems weak and when asked questions will not answer.  Patient was here yesterday for the same complaint twice in the ED and workup was negative. Patient was admitted one month ago for AMS likely metabolic encephalopathy possibly due to hypoglycemia, was a stroke code for unresponsiveness and seen by neurology at that time, negative rEEG and started on Thiamine therapy and Aspirin 81mg. When interviewed patient is alert, pleasant, and cooperative, neuro exam no focal deficit just generalize weakness, AAOx1. Denies chest pain, abdominal pain, fevers, chills, diarrhea, nausea or vomiting.    In the ED VS were /72, HR 92, RR 18, temp 98.1 lab workup was at baseline except for hypomagnesemia(1.5) that was repleted. CTH and chest xray were negative.  Was seen by neurology that recommended rEEG and MR brain which were both negative with no acute pathology.     Toxicology WNL. No metabolic cause identified. Today 8/14/23 AOx3 but not to situation. Cleared by neurology and medicine for safe discharge.

## 2023-08-11 NOTE — PROGRESS NOTE ADULT - SUBJECTIVE AND OBJECTIVE BOX
ARLEN CALDERON  68y Male    INTERVAL HPI/OVERNIGHT EVENTS:    no complaints  ate breakfast    T(F): 97 (08-11-23 @ 04:50), Max: 97.2 (08-10-23 @ 21:00)  HR: 80 (08-11-23 @ 08:24) (79 - 83)  BP: 186/84 (08-11-23 @ 08:24) (135/72 - 186/84)  RR: 18 (08-11-23 @ 08:24) (18 - 18)  SpO2: 98% (08-10-23 @ 12:07) (98% - 98%)  I&O's Summary    CAPILLARY BLOOD GLUCOSE      POCT Blood Glucose.: 229 mg/dL (11 Aug 2023 11:06)  POCT Blood Glucose.: 122 mg/dL (11 Aug 2023 07:34)  POCT Blood Glucose.: 179 mg/dL (10 Aug 2023 21:15)  POCT Blood Glucose.: 145 mg/dL (10 Aug 2023 16:45)        PHYSICAL EXAM:  GENERAL: NAD  HEAD:  Normocephalic  EYES:  conjunctiva and sclera clear  ENMT: Moist mucous membranes  NERVOUS SYSTEM:  Alert, awake, Good concentration  CHEST/LUNG: CTA b/l  HEART: Regular rate and rhythm  ABDOMEN: Soft, Nontender, Nondistended  EXTREMITIES:   No edema  SKIN: warm, dry    Consultant(s) Notes Reviewed:  [x ] YES  [ ] NO  Care Discussed with Consultants/Other Providers [ x] YES  [ ] NO    MEDICATIONS  (STANDING):  aspirin enteric coated 81 milliGRAM(s) Oral daily  atorvastatin 10 milliGRAM(s) Oral at bedtime  chlorhexidine 2% Cloths 1 Application(s) Topical <User Schedule>  dextrose 5%. 1000 milliLiter(s) (50 mL/Hr) IV Continuous <Continuous>  dextrose 5%. 1000 milliLiter(s) (100 mL/Hr) IV Continuous <Continuous>  dextrose 5%. 1000 milliLiter(s) (100 mL/Hr) IV Continuous <Continuous>  dextrose 5%. 1000 milliLiter(s) (50 mL/Hr) IV Continuous <Continuous>  dextrose 50% Injectable 25 Gram(s) IV Push once  dextrose 50% Injectable 12.5 Gram(s) IV Push once  dextrose 50% Injectable 25 Gram(s) IV Push once  enoxaparin Injectable 40 milliGRAM(s) SubCutaneous every 24 hours  glucagon  Injectable 1 milliGRAM(s) IntraMuscular once  glucagon  Injectable 1 milliGRAM(s) IntraMuscular once  insulin glargine Injectable (LANTUS) 20 Unit(s) SubCutaneous at bedtime  insulin lispro (ADMELOG) corrective regimen sliding scale   SubCutaneous three times a day before meals  lisinopril 40 milliGRAM(s) Oral daily  NIFEdipine XL 30 milliGRAM(s) Oral daily    MEDICATIONS  (PRN):  dextrose Oral Gel 15 Gram(s) Oral once PRN Blood Glucose LESS THAN 70 milliGRAM(s)/deciliter      LABS:                        14.3   6.08  )-----------( 308      ( 11 Aug 2023 06:52 )             42.4     08-11    134<L>  |  95<L>  |  16  ----------------------------<  127<H>  4.7   |  28  |  0.5<L>    Ca    9.9      11 Aug 2023 06:52  Mg     1.8     08-11                  Case discussed with resident today    Care discussed with pt

## 2023-08-11 NOTE — CHART NOTE - NSCHARTNOTEFT_GEN_A_CORE
I was unable to reach sister after calling her several times. I reached out to [facility] who told me that from their knowledge, he does not have a pacemaker, implant, or metal in his body and no history of surgery. I reviewed his chart. His chest X-ray did not show any signs of a pacemaker. He did not have any operative notes on chart and there are no documentation of pacemaker, implant, metal in his body, or surgery history. He should be okay to get an MRI.

## 2023-08-11 NOTE — DISCHARGE NOTE PROVIDER - CARE PROVIDER_API CALL
Riaz Rainey  Internal Medicine  [address] [place] [address]  Phone: [phone]  Fax: [phone]  Established Patient  Follow Up Time: 1 week

## 2023-08-11 NOTE — PROGRESS NOTE ADULT - SUBJECTIVE AND OBJECTIVE BOX
----------Daily Progress Note----------    HISTORY OF PRESENT ILLNESS:  Patient is a 69 yo M w/PMH of DM, HTN, schizophrenia, and recent admission last month for AMS likely 2/2 metabolic encephalopathy due to hypoglycemia and was started on thiamine and aspirin, who presents for altered mental status with weakness and not answering questions, presented a day prior to the ED with same chief complaint w/negative work up, is VSS at current ED visit, exam showing AxO1 and no focal neurologic deficit, labs notable for lactate 2.6 -> 1.2 and Mg 1.5, and CXR and CT Head unremarkable, now admitted for further work up and management of AMS.     Today is hospital day 3d.     ED course:  68-year-old male with a PMH of DM, HTN, schizophrenia, and recent admission last month for AMS presents for altered mental status from [facility] assisted living. Staff from [facility] state that patient seems weak and when asked questions will not answer.  Patient was here yesterday for the same complaint twice in the ED and workup was negative. Patient was admitted one month ago for AMS likely metabolic encephalopathy possibly due to hypoglycemia, was a stroke code for unresponsiveness and seen by neurology at that time, negative rEEG and started on Thiamine therapy and Aspirin 81mg. When interviewed patient is alert, pleasant, and cooperative, neuro exam no focal deficit just generalize weakness, AAOx1 (baseline). Denies chest pain, abdominal pain, fevers, chills, diarrhea, nausea or vomiting.    In the ED VS were /72, HR 92, RR 18, temp 98.1 lab workup was at baseline except for hypomagnesemia(1.5) that was repleted. CTH and chest xray were negative.  Was seen by neurology that recommended rEEG and MR brain.     INTERVAL HOSPITAL COURSE / OVERNIGHT EVENTS:  O/N event:  None    24 hr event:  None    Patient was examined and seen at bedside. Patient is currently alert and oriented to person, place, and time, denies any pain or other symptoms, does not answer questions properly, is a poor historian    Review of Systems: Otherwise unremarkable       <<<<<PAST MEDICAL & SURGICAL HISTORY>>>>>  Diabetes    Schizo-affective schizophrenia    No significant past surgical history    No significant past surgical history      ALLERGIES  No Known Allergies      Home Medications:  Haldol 1 mg oral tablet: 1 tab(s) orally once a day (09 Aug 2023 18:14)  Lipitor 10 mg oral tablet: 1 tab(s) orally once a day (09 Aug 2023 18:14)  Prinivil 40 mg oral tablet: 1 tab(s) orally once a day (09 Aug 2023 18:14)        MEDICATIONS  STANDING MEDICATIONS  aspirin enteric coated 81 milliGRAM(s) Oral daily  atorvastatin 10 milliGRAM(s) Oral at bedtime  chlorhexidine 2% Cloths 1 Application(s) Topical <User Schedule>  dextrose 5%. 1000 milliLiter(s) IV Continuous <Continuous>  dextrose 5%. 1000 milliLiter(s) IV Continuous <Continuous>  dextrose 5%. 1000 milliLiter(s) IV Continuous <Continuous>  dextrose 5%. 1000 milliLiter(s) IV Continuous <Continuous>  dextrose 50% Injectable 12.5 Gram(s) IV Push once  dextrose 50% Injectable 25 Gram(s) IV Push once  dextrose 50% Injectable 25 Gram(s) IV Push once  enoxaparin Injectable 40 milliGRAM(s) SubCutaneous every 24 hours  glucagon  Injectable 1 milliGRAM(s) IntraMuscular once  glucagon  Injectable 1 milliGRAM(s) IntraMuscular once  insulin glargine Injectable (LANTUS) 20 Unit(s) SubCutaneous at bedtime  insulin lispro (ADMELOG) corrective regimen sliding scale   SubCutaneous three times a day before meals  lisinopril 40 milliGRAM(s) Oral daily  NIFEdipine XL 30 milliGRAM(s) Oral daily    PRN MEDICATIONS  dextrose Oral Gel 15 Gram(s) Oral once PRN    VITALS:  T(F): 98.3  HR: 83  BP: 110/55  RR: 18  SpO2: --    <<<<<PHYSICAL EXAM>>>>>  GENERAL: Elderly cachetic appearing man laying in bed in no apparent distress  PULMONARY: Clear to auscultation bilaterally. No rales, rhonchi, or wheezing.  CARDIOVASCULAR: RRR, no M/R/G, S1/S2+  GASTROINTESTINAL: Soft, non-tender, non-distended, no guarding.  SKIN/EXTREMITIES: Warm, 2+ tibialis posterior pulse, no UE or LE edema  NEUROLOGIC/MUSCULOSKELETAL: AOx2, grossly moving all extremities, no focal deficits.    <<<<<LABS>>>>>                        14.3   6.08  )-----------( 308      ( 11 Aug 2023 06:52 )             42.4     08-11    134<L>  |  95<L>  |  16  ----------------------------<  127<H>  4.7   |  28  |  0.5<L>    Ca    9.9      11 Aug 2023 06:52  Mg     1.8             Urinalysis Basic - ( 11 Aug 2023 07:16 )    Color: Yellow / Appearance: Cloudy / S.022 / pH: x  Gluc: x / Ketone: Negative mg/dL  / Bili: Negative / Urobili: 1.0 mg/dL   Blood: x / Protein: 30 mg/dL / Nitrite: Negative   Leuk Esterase: Negative / RBC: 1 /HPF / WBC 0 /HPF   Sq Epi: x / Non Sq Epi: 0 /HPF / Bacteria: Negative /HPF          [id number]        <<<<<RADIOLOGY>>>>>  MR Head w/wo contrast (): No acute intracranial pathology or abnormal enhancement. Mild chronic microvascular ischemic changes.    CT Head (): No acute intracranial pathology. Stable mild chronic microvascular ischemic changes.    CXR (): No radiographic evidence of acute cardiopulmonary disease.    CT Head (): Motion limited study demonstrating no gross evidence of acute intracranial pathology.    CXR (): No radiographic evidence of acute cardiopulmonary disease.        -----------------------------------------------------------------------------------------------------------------------------------------------------------------------------------------------

## 2023-08-11 NOTE — EEG REPORT - NS EEG TEXT BOX
[facility] Department of Neurology  Inpatient Routine-Electroencephalography Report    Patient Name:	ARLEN CALDERON    :	1955  MRN:	-    Study Date/Time:  2023, 9:52:33 PM  Referred by:  select    Brief Clinical History:  ARLEN CALDERON is a 68 year old -; study performed to investigate for seizures or markers of epilepsy.    Diagnosis Code: R56.9 convulsions/seizure  CPT:  30705 (awake/drowsy)    Pertinent Medications    Acquisition Details:  Electroencephalography was acquired using a minimum of 21 channels on an [employer] system v 9.3.1 with electrode placement according to the standard International 10-20 system following ACNS (American Clinical Neurophysiology Society) guidelines.  Anterior temporal T1 and T2 electrodes were utilized whenever possible.   The XLTEK automated spike &     Findings:  Background:  continuous, with predominantly alpha and beta frequencies.  Voltage:  Normal (20+ uV)  Organization: Appropriate anterior-posterior gradient.  Posterior Dominant Rhythm:  select symmetric, well-organized, and well-modulated.  Variability: Yes. 		Reactivity: Yes.  N2 sleep: Absent.  Focal abnormalities:    1)	No persistent asymmetries of voltage or frequency.  Spontaneous Activity:    No epileptiform discharges.  Periodic/rhythmic activity:  None  Events:  No electrographic seizures or significant clinical events.  Provocations:  1)	Hyperventilation:  was not performed.  2)	Photic stimulation: was not performed.    Summary:  Normal  inpatient routine EEG study, awake and drowsy.  1)	The EEG remained normal throughout the study    Clinical Correlation:  There were no findings of active epilepsy, however this alone does not rule out the diagnosis.     Bernardo Roe MD  Attending Neurologist, Division of Epilepsy

## 2023-08-11 NOTE — DISCHARGE NOTE PROVIDER - NSDCCPCAREPLAN_GEN_ALL_CORE_FT
PRINCIPAL DISCHARGE DIAGNOSIS  Diagnosis: Altered mental status  Assessment and Plan of Treatment: You were diagnosed with AMS. Metabolic and neurologic causes were ruled out. You returned to basic level of functioning and can be discharged safely.      SECONDARY DISCHARGE DIAGNOSES  Diagnosis: Hypomagnesemia  Assessment and Plan of Treatment: This issue was treated.

## 2023-08-11 NOTE — PROGRESS NOTE ADULT - ASSESSMENT
69 y/o man with PMH of HTN, DM II, Schizophrenia and recent admission last month for AMS presented for altered mental status from [facility] assisted living.    1. Altered Mental status  Improved - pt is AA&Ox 3 today and knows  and ate breakfast    Head CT showed no acute intracranial pathology  Alcohol level<10  UA negative for infection and urine toxicology negative  CXR NAPD   Neurology consult appreciated - less likely neurological cause but recommended EEG (negative) and brain MRI, pending.   He was admitted for similar reason few months ago and found with hypoglycemia. DM is now better controlled     2. DM type II   mostly controlled on lantus 20 Units SQ daily  monitor FS    3. HTN - remains uncontrolled on lisinopril 40mg daily - agree with nifedipine XL 30mg daily and monitor    4. h/o Schizophrenia - pt is not demonstrating psychosis and is calm and cooperative with staff  clarify if on any meds  no need for behavioral health eval at this time    5. DVT ppx: Lovenox   OOB and ambulate with staff    full code      PROGRESS NOTE HANDOFF    Pending: MRI of brain    Disposition: [facility] residence - if not discharged today, pt will have to stay the weekend and anticipate for discharge on Monday  discussed with case management

## 2023-08-11 NOTE — DISCHARGE NOTE PROVIDER - ATTENDING DISCHARGE PHYSICAL EXAMINATION:
PHYSICAL EXAM:  GENERAL: NAD  HEAD:  Atraumatic, Normocephalic  EYES: conjunctiva and sclera clear  NECK: Supple, No JVD  CHEST/LUNG: Clear to auscultation bilaterally; No wheeze  HEART: Regular rate and rhythm; No murmurs, rubs, or gallops  ABDOMEN: Soft, Nontender, Nondistended; Bowel sounds present  EXTREMITIES:  2+ Peripheral Pulses, No clubbing, cyanosis, or edema  SKIN: No rashes or lesions

## 2023-08-11 NOTE — PROGRESS NOTE ADULT - ASSESSMENT
Patient is a 69 yo M w/PMH of DM, HTN, schizophrenia, and recent admission last month for AMS likely 2/2 metabolic encephalopathy due to hypoglycemia and was started on thiamine and aspirin, who presents for altered mental status with weakness and not answering questions, presented a day prior to the ED with same chief complaint w/negative work up, is VSS at current ED visit, exam showing AxO1 and no focal neurologic deficit, labs unremarkable except for Mg 1.5, and CXR and CT Head unremarkable, now admitted for further work up and management of AMS.    #Altered Mental Status, suspected toxic metabolic encephalopathy vs. chronic dementia with waxing/waning mental status  Patient remains mentally stable compared to yesterday, does not give direct answers to simple questions  *MR brain (8/11): No acute intracranial pathology or abnormal enhancement. Mild chronic microvascular ischemic changes.  *Head CT (8/8): No acute intracranial pathology  - Has improved on neuro exam yesterday with AxO3 and no focal neurologic deficits  - Denies any drug use  - Alcohol level < 10  - Urine drug screen negative  - Urinalysis negative  - Urine culture negative  - Neurology consult note: Less likely neurologic cause, recommended EEG and MR Head w/wo contrast  - EEG done (8/8), normal EEG, no signs of active seizures, no cognitive slowing  - HOLDING insulin due to likely hypoglycemia encephalopathy last month    #DM II   - A1C 8.1  - Monitor FS  - Lantus 20 u nightly    #HTN  /55, better controlled today  - c/w home lisinopril 40 mg daily  - started nifedipine 30 daily    #ASCVD risk prevention  - c/w aspirin 81 mg, unclear why patient is on aspirin  - c/w home atorvastatin 10 mg    #Thiamine deficiency?  - not currently on thiamine, was discharged on it a month ago    #MISC  - DVT PPx: Lovenox  - Diet: Minced and moist  - CHG: Chlorhexidine wiptes  - Activity: Ambulate as tolerated  - Dispo: From [facility]   - Code: Full, no record of MOLST or GOC on chart

## 2023-08-11 NOTE — DISCHARGE NOTE PROVIDER - NSDCMRMEDTOKEN_GEN_ALL_CORE_FT
aspirin 81 mg oral delayed release tablet: 1 tab(s) orally once a day take 1 tablet once a day  Haldol 1 mg oral tablet: 1 tab(s) orally once a day  insulin glargine 100 units/mL subcutaneous solution: 20 unit(s) subcutaneous once a day (at bedtime)  Lipitor 10 mg oral tablet: 1 tab(s) orally once a day  metFORMIN 500 mg oral tablet, extended release: 1 tab(s) orally 2 times a day  NIFEdipine 30 mg oral tablet, extended release: 1 tab(s) orally once a day  Prinivil 40 mg oral tablet: 1 tab(s) orally once a day   aspirin 81 mg oral delayed release tablet: 1 tab(s) orally once a day take 1 tablet once a day  Haldol 1 mg oral tablet: 1 tab(s) orally once a day  insulin glargine 100 units/mL subcutaneous solution: 14 unit(s) subcutaneous once a day (at bedtime)  Lipitor 10 mg oral tablet: 1 tab(s) orally once a day  metFORMIN 500 mg oral tablet, extended release: 1 tab(s) orally 2 times a day  NIFEdipine 30 mg oral tablet, extended release: 1 tab(s) orally once a day  Prinivil 40 mg oral tablet: 1 tab(s) orally once a day   aspirin 81 mg oral delayed release tablet: 1 tab(s) orally once a day take 1 tablet once a day  Haldol 1 mg oral tablet: 1 tab(s) orally once a day  insulin glargine 100 units/mL subcutaneous solution: 14 unit(s) subcutaneous once a day (at bedtime)  Lipitor 10 mg oral tablet: 1 tab(s) orally once a day  metFORMIN 500 mg oral tablet, extended release: 1 tab(s) orally 2 times a day  NIFEdipine 60 mg oral tablet, extended release: 1 tab(s) orally once a day

## 2023-08-12 LAB
ANION GAP SERPL CALC-SCNC: 11 MMOL/L — SIGNIFICANT CHANGE UP (ref 7–14)
BUN SERPL-MCNC: 18 MG/DL — SIGNIFICANT CHANGE UP (ref 10–20)
CALCIUM SERPL-MCNC: 9.6 MG/DL — SIGNIFICANT CHANGE UP (ref 8.4–10.4)
CHLORIDE SERPL-SCNC: 96 MMOL/L — LOW (ref 98–110)
CO2 SERPL-SCNC: 27 MMOL/L — SIGNIFICANT CHANGE UP (ref 17–32)
CREAT SERPL-MCNC: 0.7 MG/DL — SIGNIFICANT CHANGE UP (ref 0.7–1.5)
CULTURE RESULTS: SIGNIFICANT CHANGE UP
EGFR: 99 ML/MIN/1.73M2 — SIGNIFICANT CHANGE UP
GLUCOSE BLDC GLUCOMTR-MCNC: 132 MG/DL — HIGH (ref 70–99)
GLUCOSE SERPL-MCNC: 151 MG/DL — HIGH (ref 70–99)
HCT VFR BLD CALC: 42.3 % — SIGNIFICANT CHANGE UP (ref 42–52)
HGB BLD-MCNC: 14.3 G/DL — SIGNIFICANT CHANGE UP (ref 14–18)
MAGNESIUM SERPL-MCNC: 2 MG/DL — SIGNIFICANT CHANGE UP (ref 1.8–2.4)
MCHC RBC-ENTMCNC: 30.6 PG — SIGNIFICANT CHANGE UP (ref 27–31)
MCHC RBC-ENTMCNC: 33.8 G/DL — SIGNIFICANT CHANGE UP (ref 32–37)
MCV RBC AUTO: 90.4 FL — SIGNIFICANT CHANGE UP (ref 80–94)
NRBC # BLD: 0 /100 WBCS — SIGNIFICANT CHANGE UP (ref 0–0)
PLATELET # BLD AUTO: 317 K/UL — SIGNIFICANT CHANGE UP (ref 130–400)
PMV BLD: 10.5 FL — HIGH (ref 7.4–10.4)
POTASSIUM SERPL-MCNC: 4.8 MMOL/L — SIGNIFICANT CHANGE UP (ref 3.5–5)
POTASSIUM SERPL-SCNC: 4.8 MMOL/L — SIGNIFICANT CHANGE UP (ref 3.5–5)
RBC # BLD: 4.68 M/UL — LOW (ref 4.7–6.1)
RBC # FLD: 12.7 % — SIGNIFICANT CHANGE UP (ref 11.5–14.5)
SODIUM SERPL-SCNC: 134 MMOL/L — LOW (ref 135–146)
SPECIMEN SOURCE: SIGNIFICANT CHANGE UP
WBC # BLD: 5.34 K/UL — SIGNIFICANT CHANGE UP (ref 4.8–10.8)
WBC # FLD AUTO: 5.34 K/UL — SIGNIFICANT CHANGE UP (ref 4.8–10.8)

## 2023-08-12 PROCEDURE — 99232 SBSQ HOSP IP/OBS MODERATE 35: CPT

## 2023-08-12 RX ORDER — HALOPERIDOL DECANOATE 100 MG/ML
1 INJECTION INTRAMUSCULAR DAILY
Refills: 0 | Status: DISCONTINUED | OUTPATIENT
Start: 2023-08-12 | End: 2023-08-12

## 2023-08-12 RX ADMIN — ATORVASTATIN CALCIUM 10 MILLIGRAM(S): 80 TABLET, FILM COATED ORAL at 22:07

## 2023-08-12 RX ADMIN — CHLORHEXIDINE GLUCONATE 1 APPLICATION(S): 213 SOLUTION TOPICAL at 05:27

## 2023-08-12 RX ADMIN — Medication 81 MILLIGRAM(S): at 11:51

## 2023-08-12 RX ADMIN — ENOXAPARIN SODIUM 40 MILLIGRAM(S): 100 INJECTION SUBCUTANEOUS at 11:51

## 2023-08-12 RX ADMIN — LISINOPRIL 40 MILLIGRAM(S): 2.5 TABLET ORAL at 05:25

## 2023-08-12 RX ADMIN — Medication 30 MILLIGRAM(S): at 05:25

## 2023-08-12 RX ADMIN — INSULIN GLARGINE 20 UNIT(S): 100 INJECTION, SOLUTION SUBCUTANEOUS at 22:06

## 2023-08-12 RX ADMIN — Medication 3: at 11:50

## 2023-08-12 NOTE — PROGRESS NOTE ADULT - SUBJECTIVE AND OBJECTIVE BOX
*IM ATTENDING NOTE*      ARLEN CALDERON  68y  Male      Patient is a 68y old  Male who presents with a chief complaint of AMS (11 Aug 2023 21:29)      INTERVAL HPI/OVERNIGHT EVENTS:  No acute overnight events. Arlen denied any complaints.       Vital Signs Last 24 Hrs  T(C): 36.8 (12 Aug 2023 05:25), Max: 36.8 (11 Aug 2023 19:57)  T(F): 98.2 (12 Aug 2023 05:25), Max: 98.3 (11 Aug 2023 19:57)  HR: 80 (12 Aug 2023 05:25) (80 - 83)  BP: 118/60 (12 Aug 2023 05:25) (110/55 - 118/60)  RR: 18 (12 Aug 2023 05:25) (18 - 18)  SpO2: 98% (12 Aug 2023 05:25) (98% - 98%)      08-11-23 @ 07:01  -  08-12-23 @ 07:00  --------------------------------------------------------  IN: 240 mL / OUT: 0 mL / NET: 240 mL        PHYSICAL EXAM:  GEN: No acute distress  LUNGS: Clear to auscultation bilaterally   HEART: S1/S2 present. RRR.   ABD/ GI: Soft, non-tender, non-distended. Bowel sounds present  EXT: NC/NC/NE/2+PP/PAIGE  NEURO: AAOX3    Consultant(s) Notes Reviewed:  [x ] YES  [ ] NO    Discussed with Consultants/Other Providers [ x] YES     LABS                          14.3   5.34  )-----------( 317      ( 12 Aug 2023 07:13 )             42.3     08-12    134<L>  |  96<L>  |  18  ----------------------------<  151<H>  4.8   |  27  |  0.7    Ca    9.6      12 Aug 2023 07:13  Mg     2.0     08-12        Urinalysis Basic - ( 12 Aug 2023 07:13 )  Color: x / Appearance: x / SG: x / pH: x  Gluc: 151 mg/dL / Ketone: x  / Bili: x / Urobili: x   Blood: x / Protein: x / Nitrite: x   Leuk Esterase: x / RBC: x / WBC x   Sq Epi: x / Non Sq Epi: x / Bacteria: x      < from: MR Head w/wo IV Cont (08.11.23 @ 13:43) >  IMPRESSION:  No acute intracranial pathology or abnormal enhancement.    Mild chronic microvascular ischemic changes.    --- End of Report ---

## 2023-08-12 NOTE — PROGRESS NOTE ADULT - ASSESSMENT
67 y/o man with PMH of HTN, DM II, Schizophrenia and recent admission last month for AMS presented for altered mental status from [facility].    1. Altered Mental status  Improved - pt is AA&Ox 3 today and knows  and ate breakfast    Head CT showed no acute intracranial pathology  Alcohol level<10  UA negative for infection and urine toxicology negative  CXR NAPD   Neurology consult appreciated - less likely neurological cause but recommended EEG (negative) and brain MRI (negative)   He was admitted for similar reason few months ago and found with hypoglycemia. DM is now better controlled     2. DM type II   mostly controlled on lantus 20 Units SQ daily  monitor FS    3. HTN - remains uncontrolled on lisinopril 40mg daily - agree with nifedipine XL 30mg daily and monitor    4. h/o Schizophrenia - pt is not demonstrating psychosis and is calm and cooperative with staff  clarify if on any meds  no need for behavioral health eval at this time    5. DVT ppx: Lovenox   OOB and ambulate with staff    full code      PROGRESS NOTE HANDOFF:   Disposition: [facility] residence: pt will have to stay the weekend and anticipate for discharge on Monday  discussed with case management   69 y/o man with PMH of HTN, DM II, Schizophrenia and recent admission last month for AMS presented for altered mental status from [facility].    1. Altered Mental status  Improved - pt is AA&Ox 3 today and knows  and ate breakfast    Head CT showed no acute intracranial pathology  Alcohol level<10  UA negative for infection and urine toxicology negative  CXR NAPD   Neurology consult appreciated - less likely neurological cause but recommended EEG (negative) and brain MRI (negative)   He was admitted for similar reason few months ago and found with hypoglycemia. DM is now better controlled     2. DM type II   mostly controlled on lantus 20 Units SQ daily  monitor FS    3. HTN - better controlled on lisinopril 40mg daily with addition of nifedipine XL 30mg daily.    4. h/o Schizophrenia - pt is not demonstrating psychosis and is calm and cooperative with staff  clarify if on any meds  no need for behavioral health eval at this time    5. DVT ppx: Lovenox   OOB and ambulate with staff    full code      PROGRESS NOTE HANDOFF:   Disposition: [facility] residence: pt will have to stay the weekend and anticipate for discharge on Monday  discussed with case management

## 2023-08-13 LAB
ANION GAP SERPL CALC-SCNC: 9 MMOL/L — SIGNIFICANT CHANGE UP (ref 7–14)
BUN SERPL-MCNC: 17 MG/DL — SIGNIFICANT CHANGE UP (ref 10–20)
CALCIUM SERPL-MCNC: 9.8 MG/DL — SIGNIFICANT CHANGE UP (ref 8.4–10.5)
CHLORIDE SERPL-SCNC: 93 MMOL/L — LOW (ref 98–110)
CO2 SERPL-SCNC: 30 MMOL/L — SIGNIFICANT CHANGE UP (ref 17–32)
CREAT SERPL-MCNC: 0.6 MG/DL — LOW (ref 0.7–1.5)
EGFR: 104 ML/MIN/1.73M2 — SIGNIFICANT CHANGE UP
GLUCOSE SERPL-MCNC: 201 MG/DL — HIGH (ref 70–99)
HCT VFR BLD CALC: 43.7 % — SIGNIFICANT CHANGE UP (ref 42–52)
HGB BLD-MCNC: 14.5 G/DL — SIGNIFICANT CHANGE UP (ref 14–18)
MAGNESIUM SERPL-MCNC: 2 MG/DL — SIGNIFICANT CHANGE UP (ref 1.8–2.4)
MCHC RBC-ENTMCNC: 30.1 PG — SIGNIFICANT CHANGE UP (ref 27–31)
MCHC RBC-ENTMCNC: 33.2 G/DL — SIGNIFICANT CHANGE UP (ref 32–37)
MCV RBC AUTO: 90.7 FL — SIGNIFICANT CHANGE UP (ref 80–94)
NRBC # BLD: 0 /100 WBCS — SIGNIFICANT CHANGE UP (ref 0–0)
PLATELET # BLD AUTO: 328 K/UL — SIGNIFICANT CHANGE UP (ref 130–400)
PMV BLD: 10.5 FL — HIGH (ref 7.4–10.4)
POTASSIUM SERPL-MCNC: 5.5 MMOL/L — HIGH (ref 3.5–5)
POTASSIUM SERPL-SCNC: 5.5 MMOL/L — HIGH (ref 3.5–5)
RBC # BLD: 4.82 M/UL — SIGNIFICANT CHANGE UP (ref 4.7–6.1)
RBC # FLD: 12.7 % — SIGNIFICANT CHANGE UP (ref 11.5–14.5)
SODIUM SERPL-SCNC: 132 MMOL/L — LOW (ref 135–146)
WBC # BLD: 5.31 K/UL — SIGNIFICANT CHANGE UP (ref 4.8–10.8)
WBC # FLD AUTO: 5.31 K/UL — SIGNIFICANT CHANGE UP (ref 4.8–10.8)

## 2023-08-13 PROCEDURE — 99232 SBSQ HOSP IP/OBS MODERATE 35: CPT

## 2023-08-13 RX ADMIN — ATORVASTATIN CALCIUM 10 MILLIGRAM(S): 80 TABLET, FILM COATED ORAL at 22:29

## 2023-08-13 RX ADMIN — Medication 1: at 11:39

## 2023-08-13 RX ADMIN — ENOXAPARIN SODIUM 40 MILLIGRAM(S): 100 INJECTION SUBCUTANEOUS at 11:41

## 2023-08-13 RX ADMIN — Medication 1: at 17:50

## 2023-08-13 RX ADMIN — INSULIN GLARGINE 20 UNIT(S): 100 INJECTION, SOLUTION SUBCUTANEOUS at 22:29

## 2023-08-13 RX ADMIN — Medication 81 MILLIGRAM(S): at 11:41

## 2023-08-13 NOTE — PROGRESS NOTE ADULT - ASSESSMENT
69 y/o man with PMH of HTN, DM II, Schizophrenia and recent admission last month for AMS presented for altered mental status from [facility] assisted living.    1. Altered Mental status  Improved - pt is AA&Ox 3 today and knows  and ate breakfast    Head CT showed no acute intracranial pathology  Alcohol level<10  UA negative for infection and urine toxicology negative  CXR NAPD   Neurology consult appreciated - less likely neurological cause but recommended EEG (negative) and brain MRI (negative)   He was admitted for similar reason few months ago and found with hypoglycemia. DM is now better controlled     2. DM type II   mostly controlled on lantus 20 Units SQ daily  monitor FS    3. HTN - better controlled on lisinopril 40mg daily with addition of nifedipine XL 30mg daily.    4. h/o Schizophrenia - pt is not demonstrating psychosis and is calm and cooperative with staff  clarify if on any meds  no need for behavioral health eval at this time    5. DVT ppx: Lovenox   OOB and ambulate with staff    full code    PROGRESS NOTE HANDOFF:   Disposition: [facility] residence: pt will have to stay the weekend and anticipate for discharge on Monday  discussed with case management    Lizz Mancilla   Covering hospitalist  I can be reached directly on MS Teams. 69 y/o man with PMH of HTN, DM II, Schizophrenia and recent admission last month for AMS presented for altered mental status from [facility] assisted [facility].    1. Altered Mental status  Improved - pt is AA&Ox 3 today and knows  and ate breakfast    Head CT showed no acute intracranial pathology  Alcohol level<10  UA negative for infection and urine toxicology negative  CXR NAPD   Neurology consult appreciated - less likely neurological cause but recommended EEG (negative) and brain MRI (negative)   He was admitted for similar reason few months ago and found with hypoglycemia. DM is now better controlled     2. DM type II   controlled on lantus 20 Units SQ daily  monitor FS    3. HTN - better controlled on lisinopril 40mg daily with addition of nifedipine XL 30mg daily.    4. h/o Schizophrenia - pt is not demonstrating psychosis and is calm and cooperative with staff  clarify if on any meds  no need for behavioral health eval at this time    5. DVT ppx: Lovenox   OOB and ambulate with staff    full code    PROGRESS NOTE HANDOFF:   Disposition: [facility] residence: pt will have to stay the weekend and anticipate for discharge on Monday  discussed with case management    Lizz Mancilla   Covering hospitalist  I can be reached directly on MS Teams.

## 2023-08-13 NOTE — PROGRESS NOTE ADULT - SUBJECTIVE AND OBJECTIVE BOX
ARLEN CALDERON 68y Male  MRN#: [id number]     SUBJECTIVE  Patient is a 68y old Male who presents with a chief complaint of AMS (12 Aug 2023 13:54)    Today is hospital day 5d, and this morning he is lying in bed without distress.   No acute overnight events.     OBJECTIVE  PAST MEDICAL & SURGICAL HISTORY  Diabetes    Schizo-affective schizophrenia    No significant past surgical history    No significant past surgical history      ALLERGIES:  No Known Allergies    MEDICATIONS:  STANDING MEDICATIONS  aspirin enteric coated 81 milliGRAM(s) Oral daily  atorvastatin 10 milliGRAM(s) Oral at bedtime  chlorhexidine 2% Cloths 1 Application(s) Topical <User Schedule>  dextrose 5%. 1000 milliLiter(s) IV Continuous <Continuous>  dextrose 5%. 1000 milliLiter(s) IV Continuous <Continuous>  dextrose 5%. 1000 milliLiter(s) IV Continuous <Continuous>  dextrose 5%. 1000 milliLiter(s) IV Continuous <Continuous>  dextrose 50% Injectable 12.5 Gram(s) IV Push once  dextrose 50% Injectable 25 Gram(s) IV Push once  dextrose 50% Injectable 25 Gram(s) IV Push once  enoxaparin Injectable 40 milliGRAM(s) SubCutaneous every 24 hours  glucagon  Injectable 1 milliGRAM(s) IntraMuscular once  glucagon  Injectable 1 milliGRAM(s) IntraMuscular once  insulin glargine Injectable (LANTUS) 20 Unit(s) SubCutaneous at bedtime  insulin lispro (ADMELOG) corrective regimen sliding scale   SubCutaneous three times a day before meals  lisinopril 40 milliGRAM(s) Oral daily  NIFEdipine XL 30 milliGRAM(s) Oral daily    PRN MEDICATIONS  dextrose Oral Gel 15 Gram(s) Oral once PRN    HOME MEDICATIONS  Home Medications:  Haldol 1 mg oral tablet: 1 tab(s) orally once a day (09 Aug 2023 18:14)  Lipitor 10 mg oral tablet: 1 tab(s) orally once a day (09 Aug 2023 18:14)  Prinivil 40 mg oral tablet: 1 tab(s) orally once a day (09 Aug 2023 18:14)      VITAL SIGNS: Last 24 Hours  T(C): 36.8 (13 Aug 2023 05:08), Max: 37 (12 Aug 2023 13:59)  T(F): 98.2 (13 Aug 2023 05:08), Max: 98.6 (12 Aug 2023 13:59)  HR: 94 (13 Aug 2023 05:08) (74 - 94)  BP: 107/55 (13 Aug 2023 05:08) (107/55 - 149/78)  BP(mean): --  RR: 18 (13 Aug 2023 05:08) (18 - 18)  SpO2: --      LABS:                        14.3   5.34  )-----------( 317      ( 12 Aug 2023 07:13 )             42.3     08-12    134<L>  |  96<L>  |  18  ----------------------------<  151<H>  4.8   |  27  |  0.7    Ca    9.6      12 Aug 2023 07:13  Mg     2.0     08-12      Urinalysis Basic - ( 12 Aug 2023 07:13 )    Color: x / Appearance: x / SG: x / pH: x  Gluc: 151 mg/dL / Ketone: x  / Bili: x / Urobili: x   Blood: x / Protein: x / Nitrite: x   Leuk Esterase: x / RBC: x / WBC x   Sq Epi: x / Non Sq Epi: x / Bacteria: x    Culture - Urine (collected 11 Aug 2023 07:16)  Source: Clean Catch Clean Catch (Midstream)  Final Report (12 Aug 2023 12:16):    <10,000 CFU/mL Normal Urogenital Minal    CAPILLARY BLOOD GLUCOSE  POCT Blood Glucose.: 140 mg/dL (13 Aug 2023 07:57)      RADIOLOGY:    PHYSICAL EXAM:  GENERAL: NAD, AAO x 4, 68y M  HEAD:  Atraumatic, Normocephalic  EYES: EOMI, conjunctivae clear and sclerae white  NECK: Supple, No JVD  CHEST/LUNG: Clear to auscultation bilaterally; No wheeze; No crackles; No accessory muscles used  HEART: Regular rate and rhythm; No murmurs;   ABDOMEN: Soft, Nontender, Nondistended; Bowel sounds present; No guarding  EXTREMITIES:  2+ Peripheral Pulses, No cyanosis or edema  NEUROLOGY: non-focal    ADMISSION SUMMARY  Patient is a 68y old Male who presents with a chief complaint of AMS (12 Aug 2023 13:54)    ARLEN CALDERON 68y Male  MRN#: [id number]     SUBJECTIVE  Patient is a 68y old Male who presents with a chief complaint of AMS (12 Aug 2023 13:54)    Today is hospital day 5d, and this morning he is lying in bed without distress.   No acute overnight events.     OBJECTIVE  PAST MEDICAL & SURGICAL HISTORY  Diabetes    Schizo-affective schizophrenia    No significant past surgical history    No significant past surgical history      ALLERGIES:  No Known Allergies    MEDICATIONS:  STANDING MEDICATIONS  aspirin enteric coated 81 milliGRAM(s) Oral daily  atorvastatin 10 milliGRAM(s) Oral at bedtime  chlorhexidine 2% Cloths 1 Application(s) Topical <User Schedule>  dextrose 5%. 1000 milliLiter(s) IV Continuous <Continuous>  dextrose 5%. 1000 milliLiter(s) IV Continuous <Continuous>  dextrose 5%. 1000 milliLiter(s) IV Continuous <Continuous>  dextrose 5%. 1000 milliLiter(s) IV Continuous <Continuous>  dextrose 50% Injectable 12.5 Gram(s) IV Push once  dextrose 50% Injectable 25 Gram(s) IV Push once  dextrose 50% Injectable 25 Gram(s) IV Push once  enoxaparin Injectable 40 milliGRAM(s) SubCutaneous every 24 hours  glucagon  Injectable 1 milliGRAM(s) IntraMuscular once  glucagon  Injectable 1 milliGRAM(s) IntraMuscular once  insulin glargine Injectable (LANTUS) 20 Unit(s) SubCutaneous at bedtime  insulin lispro (ADMELOG) corrective regimen sliding scale   SubCutaneous three times a day before meals  lisinopril 40 milliGRAM(s) Oral daily  NIFEdipine XL 30 milliGRAM(s) Oral daily    PRN MEDICATIONS  dextrose Oral Gel 15 Gram(s) Oral once PRN    HOME MEDICATIONS  Home Medications:  Haldol 1 mg oral tablet: 1 tab(s) orally once a day (09 Aug 2023 18:14)  Lipitor 10 mg oral tablet: 1 tab(s) orally once a day (09 Aug 2023 18:14)  Prinivil 40 mg oral tablet: 1 tab(s) orally once a day (09 Aug 2023 18:14)      VITAL SIGNS: Last 24 Hours  T(C): 36.8 (13 Aug 2023 05:08), Max: 37 (12 Aug 2023 13:59)  T(F): 98.2 (13 Aug 2023 05:08), Max: 98.6 (12 Aug 2023 13:59)  HR: 94 (13 Aug 2023 05:08) (74 - 94)  BP: 107/55 (13 Aug 2023 05:08) (107/55 - 149/78)  BP(mean): --  RR: 18 (13 Aug 2023 05:08) (18 - 18)  SpO2: --      LABS:                        14.3   5.34  )-----------( 317      ( 12 Aug 2023 07:13 )             42.3     08-12    134<L>  |  96<L>  |  18  ----------------------------<  151<H>  4.8   |  27  |  0.7    Ca    9.6      12 Aug 2023 07:13  Mg     2.0     08-12      Urinalysis Basic - ( 12 Aug 2023 07:13 )    Color: x / Appearance: x / SG: x / pH: x  Gluc: 151 mg/dL / Ketone: x  / Bili: x / Urobili: x   Blood: x / Protein: x / Nitrite: x   Leuk Esterase: x / RBC: x / WBC x   Sq Epi: x / Non Sq Epi: x / Bacteria: x    Culture - Urine (collected 11 Aug 2023 07:16)  Source: Clean Catch Clean Catch (Midstream)  Final Report (12 Aug 2023 12:16):    <10,000 CFU/mL Normal Urogenital Minal    CAPILLARY BLOOD GLUCOSE  POCT Blood Glucose.: 140 mg/dL (13 Aug 2023 07:57)      RADIOLOGY:    PHYSICAL EXAM:  GENERAL: NAD, 68y M  HEAD:  Atraumatic, Normocephalic  EYES: EOMI, conjunctivae clear and sclerae white  NECK: Supple, No JVD  CHEST/LUNG: Clear to auscultation bilaterally; No wheeze; No crackles; No accessory muscles used  HEART: Regular rate and rhythm; No murmurs;   ABDOMEN: Soft, Nontender, Nondistended; Bowel sounds present; No guarding  EXTREMITIES:  2+ Peripheral Pulses, No cyanosis or edema  NEUROLOGY: non-focal    ADMISSION SUMMARY  Patient is a 68y old Male who presents with a chief complaint of AMS (12 Aug 2023 13:54)

## 2023-08-13 NOTE — PROGRESS NOTE ADULT - NUTRITIONAL ASSESSMENT
This patient has been assessed with a concern for Malnutrition and has been determined to have a diagnosis/diagnoses of Severe protein-calorie malnutrition.    This patient is being managed with:   Diet Minced and Moist-  Consistent Carbohydrate {Evening Snack}  DASH/TLC {Sodium & Cholesterol Restricted}  Supplement Feeding Modality:  Oral  Glucerna Shake Cans or Servings Per Day:  3       Frequency:  Daily  Entered: Aug 10 2023 12:51PM  

## 2023-08-14 LAB
ANION GAP SERPL CALC-SCNC: 10 MMOL/L — SIGNIFICANT CHANGE UP (ref 7–14)
BUN SERPL-MCNC: 23 MG/DL — HIGH (ref 10–20)
CALCIUM SERPL-MCNC: 9.8 MG/DL — SIGNIFICANT CHANGE UP (ref 8.4–10.4)
CHLORIDE SERPL-SCNC: 96 MMOL/L — LOW (ref 98–110)
CO2 SERPL-SCNC: 28 MMOL/L — SIGNIFICANT CHANGE UP (ref 17–32)
CREAT SERPL-MCNC: 0.7 MG/DL — SIGNIFICANT CHANGE UP (ref 0.7–1.5)
EGFR: 99 ML/MIN/1.73M2 — SIGNIFICANT CHANGE UP
GLUCOSE BLDC GLUCOMTR-MCNC: 157 MG/DL — HIGH (ref 70–99)
GLUCOSE BLDC GLUCOMTR-MCNC: 191 MG/DL — HIGH (ref 70–99)
GLUCOSE BLDC GLUCOMTR-MCNC: 192 MG/DL — HIGH (ref 70–99)
GLUCOSE SERPL-MCNC: 81 MG/DL — SIGNIFICANT CHANGE UP (ref 70–99)
HCT VFR BLD CALC: 41.9 % — LOW (ref 42–52)
HGB BLD-MCNC: 14.1 G/DL — SIGNIFICANT CHANGE UP (ref 14–18)
MAGNESIUM SERPL-MCNC: 2.1 MG/DL — SIGNIFICANT CHANGE UP (ref 1.8–2.4)
MCHC RBC-ENTMCNC: 30.7 PG — SIGNIFICANT CHANGE UP (ref 27–31)
MCHC RBC-ENTMCNC: 33.7 G/DL — SIGNIFICANT CHANGE UP (ref 32–37)
MCV RBC AUTO: 91.3 FL — SIGNIFICANT CHANGE UP (ref 80–94)
NRBC # BLD: 0 /100 WBCS — SIGNIFICANT CHANGE UP (ref 0–0)
PLATELET # BLD AUTO: 334 K/UL — SIGNIFICANT CHANGE UP (ref 130–400)
PMV BLD: 10.4 FL — SIGNIFICANT CHANGE UP (ref 7.4–10.4)
POTASSIUM SERPL-MCNC: 5.3 MMOL/L — HIGH (ref 3.5–5)
POTASSIUM SERPL-SCNC: 5.3 MMOL/L — HIGH (ref 3.5–5)
RBC # BLD: 4.59 M/UL — LOW (ref 4.7–6.1)
RBC # FLD: 12.8 % — SIGNIFICANT CHANGE UP (ref 11.5–14.5)
SODIUM SERPL-SCNC: 134 MMOL/L — LOW (ref 135–146)
WBC # BLD: 5.97 K/UL — SIGNIFICANT CHANGE UP (ref 4.8–10.8)
WBC # FLD AUTO: 5.97 K/UL — SIGNIFICANT CHANGE UP (ref 4.8–10.8)

## 2023-08-14 PROCEDURE — 99232 SBSQ HOSP IP/OBS MODERATE 35: CPT

## 2023-08-14 RX ORDER — INSULIN GLARGINE 100 [IU]/ML
14 INJECTION, SOLUTION SUBCUTANEOUS AT BEDTIME
Refills: 0 | Status: DISCONTINUED | OUTPATIENT
Start: 2023-08-14 | End: 2023-08-15

## 2023-08-14 RX ORDER — INSULIN GLARGINE 100 [IU]/ML
14 INJECTION, SOLUTION SUBCUTANEOUS
Qty: 1 | Refills: 0
Start: 2023-08-14 | End: 2023-09-12

## 2023-08-14 RX ORDER — NIFEDIPINE 30 MG
1 TABLET, EXTENDED RELEASE 24 HR ORAL
Qty: 30 | Refills: 0
Start: 2023-08-14 | End: 2023-09-12

## 2023-08-14 RX ADMIN — Medication 81 MILLIGRAM(S): at 12:03

## 2023-08-14 RX ADMIN — ENOXAPARIN SODIUM 40 MILLIGRAM(S): 100 INJECTION SUBCUTANEOUS at 12:04

## 2023-08-14 RX ADMIN — INSULIN GLARGINE 14 UNIT(S): 100 INJECTION, SOLUTION SUBCUTANEOUS at 22:06

## 2023-08-14 RX ADMIN — LISINOPRIL 40 MILLIGRAM(S): 2.5 TABLET ORAL at 05:35

## 2023-08-14 RX ADMIN — Medication 1: at 17:32

## 2023-08-14 RX ADMIN — Medication 30 MILLIGRAM(S): at 05:35

## 2023-08-14 RX ADMIN — ATORVASTATIN CALCIUM 10 MILLIGRAM(S): 80 TABLET, FILM COATED ORAL at 21:12

## 2023-08-14 RX ADMIN — Medication 1: at 12:09

## 2023-08-14 NOTE — PROGRESS NOTE ADULT - ASSESSMENT
67 y/o man with PMH of HTN, DM II, Schizophrenia and recent admission last month for AMS [facility]  Pending d/c     #Altered Mental status  - AA&Ox 3 today  Head CT showed no acute intracranial pathology  Alcohol level<10  UA negative for infection and urine toxicology negative  CXR NAPD   Neurology consult appreciated - less likely neurological cause but recommended EEG (negative) and brain MRI (negative)   He was admitted for similar reason few months ago and found with hypoglycemia. DM is now better controlled     #DM type II   controlled on lantus 20 Units SQ daily  monitor FS    #HTN - has been better controlled on lisinopril 40mg daily with addition of nifedipine XL 30mg daily.  - today 160 systolic - monitor if continues to uptrend adjust dose accordingly     #h/o Schizophrenia - pt is not demonstrating psychosis and is calm and cooperative with staff  clarify if on any meds  no need for behavioral health eval at this time    DVT ppx: Lovenox   GI ppx: none  Activity: OOB and ambulate with staff  disp: Nursing home  full code

## 2023-08-14 NOTE — CHART NOTE - NSCHARTNOTEFT_GEN_A_CORE
RD nutrition note     Registered Dietitian Follow-Up     Patient Profile Reviewed                           Yes [x]   No []     Nutrition History Previously Obtained        Yes [x]  No []       Pertinent Medical Interventions:   #Altered Mental status  #DM type II     Diet order: Diet, Minced and Moist:   Consistent Carbohydrate {Evening Snack}  DASH/TLC {Sodium & Cholesterol Restricted}  Supplement Feeding Modality:  Oral  Glucerna Shake Cans or Servings Per Day:  3       Frequency:  Daily (08-10-23 @ 12:51) [Active]    Anthropometrics:  - Ht. 180.3 cm  - Wt. 68 kg -- dosing weight   - %wt change  - BMI 20.9   - IBW: 78.2 kg      Pertinent Lab Data:  8/14: Sodium: 134, potassium: 5.3, BUN: 23    CAPILLARY BLOOD GLUCOSE  POCT Blood Glucose.: 83 mg/dL (14 Aug 2023 08:03)  POCT Blood Glucose.: 150 mg/dL (13 Aug 2023 22:28)  POCT Blood Glucose.: 156 mg/dL (13 Aug 2023 17:27)  POCT Blood Glucose.: 186 mg/dL (13 Aug 2023 11:27)       Pertinent Meds:  MEDICATIONS  (STANDING):  atorvastatin 10 milliGRAM(s) Oral at bedtime  dextrose 5%. 1000 milliLiter(s) (50 mL/Hr) IV Continuous <Continuous>  dextrose 5%. 1000 milliLiter(s) (100 mL/Hr) IV Continuous <Continuous>  dextrose 5%. 1000 milliLiter(s) (100 mL/Hr) IV Continuous <Continuous>  dextrose 5%. 1000 milliLiter(s) (50 mL/Hr) IV Continuous <Continuous>  dextrose 50% Injectable 25 Gram(s) IV Push once  dextrose 50% Injectable 12.5 Gram(s) IV Push once  dextrose 50% Injectable 25 Gram(s) IV Push once  enoxaparin Injectable 40 milliGRAM(s) SubCutaneous every 24 hours  glucagon  Injectable 1 milliGRAM(s) IntraMuscular once  glucagon  Injectable 1 milliGRAM(s) IntraMuscular once  insulin glargine Injectable (LANTUS) 14 Unit(s) SubCutaneous at bedtime  insulin lispro (ADMELOG) corrective regimen sliding scale   SubCutaneous three times a day before meals  lisinopril 40 milliGRAM(s) Oral daily    MEDICATIONS  (PRN):  dextrose Oral Gel 15 Gram(s) Oral once PRN Blood Glucose LESS THAN 70 milliGRAM(s)/deciliter    Physical Findings:  - Appearance: disoriented  - GI function:  - Tubes:  - Oral/Mouth cavity:  - Skin:     Nutrition Requirements  Weight Used:     Estimated Energy Needs    Continue []  Adjust []  Adjusted Energy Recommendations:   kcal/day        Estimated Protein Needs    Continue []  Adjust []  Adjusted Protein Recommendations:   gm/day        Estimated Fluid Needs        Continue []  Adjust []  Adjusted Fluid Recommendations:   mL/day     Nutrient Intake:        [] Previous Nutrition Diagnosis:            [] Ongoing          [] Resolved    [] No active nutrition diagnosis identified at this time     Nutrition Diagnostic #1  Problem:  Etiology:  Statement:     Nutrition Diagnostic #2  Problem:  Etiology:  Statement:     Nutrition Intervention      Goal/Expected Outcome:     Indicator/Monitoring: RD nutrition note     Registered Dietitian Follow-Up     Patient Profile Reviewed                           Yes [x]   No []     Nutrition History Previously Obtained        Yes [x]  No []       Pertinent Medical Interventions:   #Altered Mental status  #DM type II     Diet order: Diet, Minced and Moist:   Consistent Carbohydrate {Evening Snack}  DASH/TLC {Sodium & Cholesterol Restricted}  Supplement Feeding Modality:  Oral  Glucerna Shake Cans or Servings Per Day:  3       Frequency:  Daily (08-10-23 @ 12:51) [Active]    Anthropometrics:  - Ht. 180.3 cm  - Wt. 68 kg -- dosing weight   - %wt change  - BMI 20.9   - IBW: 78.2 kg      Pertinent Lab Data:  8/14: Sodium: 134, potassium: 5.3, BUN: 23    CAPILLARY BLOOD GLUCOSE  POCT Blood Glucose.: 83 mg/dL (14 Aug 2023 08:03)  POCT Blood Glucose.: 150 mg/dL (13 Aug 2023 22:28)  POCT Blood Glucose.: 156 mg/dL (13 Aug 2023 17:27)  POCT Blood Glucose.: 186 mg/dL (13 Aug 2023 11:27)       Pertinent Meds:  MEDICATIONS  (STANDING):  atorvastatin 10 milliGRAM(s) Oral at bedtime  dextrose 5%. 1000 milliLiter(s) (50 mL/Hr) IV Continuous <Continuous>  dextrose 5%. 1000 milliLiter(s) (100 mL/Hr) IV Continuous <Continuous>  dextrose 5%. 1000 milliLiter(s) (100 mL/Hr) IV Continuous <Continuous>  dextrose 5%. 1000 milliLiter(s) (50 mL/Hr) IV Continuous <Continuous>  dextrose 50% Injectable 25 Gram(s) IV Push once  dextrose 50% Injectable 12.5 Gram(s) IV Push once  dextrose 50% Injectable 25 Gram(s) IV Push once  enoxaparin Injectable 40 milliGRAM(s) SubCutaneous every 24 hours  glucagon  Injectable 1 milliGRAM(s) IntraMuscular once  glucagon  Injectable 1 milliGRAM(s) IntraMuscular once  insulin glargine Injectable (LANTUS) 14 Unit(s) SubCutaneous at bedtime  insulin lispro (ADMELOG) corrective regimen sliding scale   SubCutaneous three times a day before meals  lisinopril 40 milliGRAM(s) Oral daily    MEDICATIONS  (PRN):  dextrose Oral Gel 15 Gram(s) Oral once PRN Blood Glucose LESS THAN 70 milliGRAM(s)/deciliter    Physical Findings:  - Appearance: disoriented  - GI function: passing flatus noted   - Tubes: n/a  - Oral/Mouth cavity: no chewing/ swallowing difficulty at this time   - Skin: skin intact/ no edema noted     Nutrition Requirements  Using ABW: ENERGY: 5083-3185 kcal/day (MSJ 1466.795* 1-1.3 SF);  PROTEIN:  g/day (1.2-1.5 g/kg)   FLUID: 2944-9428 mL/day (25-30 mL/kg) -- with consideration for age, BMI, malnutrition     Nutrient Intake: Patients PO intake +/- 50% of meals         [] Previous Nutrition Diagnosis: malnutrition             [x] Ongoing          [] Resolved     Nutrition Intervention meals and snacks, medical nutrition supplements      Goal/Expected Outcome: Patient to meet ~75% of estimated energy and protein needs in the next 4d     Indicator/Monitoring: RD to monitor: diet order, body composition, energy intake, nutrition focused physical finding: high risk due uin 4 days    Recommendations:     1) Continue current diet order  2) Continue glucerna to optimize energy and protein intake and encourage intake of meals and provide assistance prn   --- will continue to monitor po intake over the next few days

## 2023-08-14 NOTE — PROGRESS NOTE ADULT - ATTENDING COMMENTS
Patient seen and examined. Patient is at baseline mental status. Stable for discharge back to [facility]'s adult home.

## 2023-08-14 NOTE — PROGRESS NOTE ADULT - SUBJECTIVE AND OBJECTIVE BOX
ARLEN CALDERON 68y Male  MRN#: [id number]   Hospital Day: 6d    SUBJECTIVE  Patient is a 67 yo M w/PMH of DM, HTN, schizophrenia, and recent admission last month for AMS likely 2/2 metabolic encephalopathy due to hypoglycemia and was started on thiamine and aspirin, who presents for altered mental status with weakness and not answering questions, presented a day prior to the ED with same chief complaint w/negative work up, is VSS at current ED visit, exam showing AxO1 and no focal neurologic deficit, labs notable for lactate 2.6 -> 1.2 and Mg 1.5, and CXR and CT Head unremarkable, now admitted for further work up and management of AMS.      ED course:  68-year-old male with a PMH of DM, HTN, schizophrenia, and recent admission last month for AMS presents for altered mental status from [facility] assisted living. Staff from [facility] state that patient seems weak and when asked questions will not answer.  Patient was here yesterday for the same complaint twice in the ED and workup was negative. Patient was admitted one month ago for AMS likely metabolic encephalopathy possibly due to hypoglycemia, was a stroke code for unresponsiveness and seen by neurology at that time, negative rEEG and started on Thiamine therapy and Aspirin 81mg. When interviewed patient is alert, pleasant, and cooperative, neuro exam no focal deficit just generalize weakness, AAOx1 (baseline). Denies chest pain, abdominal pain, fevers, chills, diarrhea, nausea or vomiting.    In the ED VS were /72, HR 92, RR 18, temp 98.1 lab workup was at baseline except for hypomagnesemia(1.5) that was repleted. CTH and chest xray were negative.  Was seen by neurology that recommended rEEG and MR brain.     INTERVAL HOSPITAL COURSE / OVERNIGHT EVENTS:  O/N event:      INTERVAL HPI AND OVERNIGHT EVENTS:  Patient was examined and seen at bedside. This morning he is resting comfortably in bed and reports no issues or overnight events. AAOx3      OBJECTIVE  PAST MEDICAL & SURGICAL HISTORY  Diabetes    Schizo-affective schizophrenia    No significant past surgical history    No significant past surgical history      ALLERGIES:  No Known Allergies    MEDICATIONS:  STANDING MEDICATIONS  aspirin enteric coated 81 milliGRAM(s) Oral daily  atorvastatin 10 milliGRAM(s) Oral at bedtime  chlorhexidine 2% Cloths 1 Application(s) Topical <User Schedule>  dextrose 5%. 1000 milliLiter(s) IV Continuous <Continuous>  dextrose 5%. 1000 milliLiter(s) IV Continuous <Continuous>  dextrose 5%. 1000 milliLiter(s) IV Continuous <Continuous>  dextrose 5%. 1000 milliLiter(s) IV Continuous <Continuous>  dextrose 50% Injectable 12.5 Gram(s) IV Push once  dextrose 50% Injectable 25 Gram(s) IV Push once  dextrose 50% Injectable 25 Gram(s) IV Push once  enoxaparin Injectable 40 milliGRAM(s) SubCutaneous every 24 hours  glucagon  Injectable 1 milliGRAM(s) IntraMuscular once  glucagon  Injectable 1 milliGRAM(s) IntraMuscular once  insulin glargine Injectable (LANTUS) 20 Unit(s) SubCutaneous at bedtime  insulin lispro (ADMELOG) corrective regimen sliding scale   SubCutaneous three times a day before meals  lisinopril 40 milliGRAM(s) Oral daily  NIFEdipine XL 30 milliGRAM(s) Oral daily    PRN MEDICATIONS  dextrose Oral Gel 15 Gram(s) Oral once PRN      PHYSICAL EXAM:  GENERAL: friendly, non-combative, NAD, AAOx3  CARDIO: S1/S2, RRR, no MRoG, neg JVD  RESP: NBS b/l, no crackles, wheezes, rales, unlabored  GI: soft, NTND  SKIN/EXT: in tact, no cuts/bruises, no LEEE    VITAL SIGNS: Last 24 Hours  T(C): 36.9 (14 Aug 2023 05:10), Max: 36.9 (14 Aug 2023 05:10)  T(F): 98.4 (14 Aug 2023 05:10), Max: 98.4 (14 Aug 2023 05:10)  HR: 77 (14 Aug 2023 05:10) (77 - 85)  BP: 165/86 (14 Aug 2023 05:10) (143/61 - 165/86)  BP(mean): --  RR: 18 (14 Aug 2023 05:10) (18 - 18)  SpO2: --    LABS:                        14.5   5.31  )-----------( 328      ( 13 Aug 2023 09:21 )             43.7     08-13    132<L>  |  93<L>  |  17  ----------------------------<  201<H>  5.5<H>   |  30  |  0.6<L>    Ca    9.8      13 Aug 2023 09:21  Mg     2.0     08-13        Urinalysis Basic - ( 13 Aug 2023 09:21 )    Color: x / Appearance: x / SG: x / pH: x  Gluc: 201 mg/dL / Ketone: x  / Bili: x / Urobili: x   Blood: x / Protein: x / Nitrite: x   Leuk Esterase: x / RBC: x / WBC x   Sq Epi: x / Non Sq Epi: x / Bacteria: x            Culture - Urine (collected 11 Aug 2023 07:16)  Source: Clean Catch Clean Catch (Midstream)  Final Report (12 Aug 2023 12:16):    <10,000 CFU/mL Normal Urogenital Minal          RADIOLOGY:  neg CXR, neg CTH, neg EEG, neg MRI (limited motion artifact)

## 2023-08-14 NOTE — PROGRESS NOTE ADULT - PROVIDER SPECIALTY LIST ADULT
Hospitalist
Internal Medicine

## 2023-08-15 VITALS
TEMPERATURE: 96 F | DIASTOLIC BLOOD PRESSURE: 69 MMHG | HEART RATE: 90 BPM | RESPIRATION RATE: 18 BRPM | SYSTOLIC BLOOD PRESSURE: 143 MMHG

## 2023-08-15 LAB
ANION GAP SERPL CALC-SCNC: 10 MMOL/L — SIGNIFICANT CHANGE UP (ref 7–14)
BUN SERPL-MCNC: 23 MG/DL — HIGH (ref 10–20)
CALCIUM SERPL-MCNC: 10.2 MG/DL — SIGNIFICANT CHANGE UP (ref 8.4–10.5)
CHLORIDE SERPL-SCNC: 100 MMOL/L — SIGNIFICANT CHANGE UP (ref 98–110)
CO2 SERPL-SCNC: 27 MMOL/L — SIGNIFICANT CHANGE UP (ref 17–32)
CREAT SERPL-MCNC: 0.6 MG/DL — LOW (ref 0.7–1.5)
EGFR: 104 ML/MIN/1.73M2 — SIGNIFICANT CHANGE UP
GLUCOSE BLDC GLUCOMTR-MCNC: 155 MG/DL — HIGH (ref 70–99)
GLUCOSE BLDC GLUCOMTR-MCNC: 85 MG/DL — SIGNIFICANT CHANGE UP (ref 70–99)
GLUCOSE BLDC GLUCOMTR-MCNC: 93 MG/DL — SIGNIFICANT CHANGE UP (ref 70–99)
GLUCOSE SERPL-MCNC: 85 MG/DL — SIGNIFICANT CHANGE UP (ref 70–99)
POTASSIUM SERPL-MCNC: 5.5 MMOL/L — HIGH (ref 3.5–5)
POTASSIUM SERPL-SCNC: 5.5 MMOL/L — HIGH (ref 3.5–5)
SODIUM SERPL-SCNC: 137 MMOL/L — SIGNIFICANT CHANGE UP (ref 135–146)

## 2023-08-15 PROCEDURE — 99239 HOSP IP/OBS DSCHRG MGMT >30: CPT

## 2023-08-15 RX ORDER — LISINOPRIL 2.5 MG/1
1 TABLET ORAL
Refills: 0 | DISCHARGE

## 2023-08-15 RX ORDER — NIFEDIPINE 30 MG
1 TABLET, EXTENDED RELEASE 24 HR ORAL
Qty: 30 | Refills: 0
Start: 2023-08-15 | End: 2023-09-13

## 2023-08-15 RX ORDER — NIFEDIPINE 30 MG
60 TABLET, EXTENDED RELEASE 24 HR ORAL DAILY
Refills: 0 | Status: DISCONTINUED | OUTPATIENT
Start: 2023-08-16 | End: 2023-08-15

## 2023-08-15 RX ADMIN — CHLORHEXIDINE GLUCONATE 1 APPLICATION(S): 213 SOLUTION TOPICAL at 05:12

## 2023-08-15 RX ADMIN — LISINOPRIL 40 MILLIGRAM(S): 2.5 TABLET ORAL at 05:10

## 2023-08-15 RX ADMIN — Medication 1: at 12:54

## 2023-08-15 RX ADMIN — Medication 81 MILLIGRAM(S): at 11:09

## 2023-08-15 RX ADMIN — Medication 30 MILLIGRAM(S): at 05:10

## 2023-08-15 RX ADMIN — ENOXAPARIN SODIUM 40 MILLIGRAM(S): 100 INJECTION SUBCUTANEOUS at 11:10

## 2023-08-15 NOTE — DISCHARGE NOTE NURSING/CASE MANAGEMENT/SOCIAL WORK - NSDCPEFALRISK_GEN_ALL_CORE
For information on Fall & Injury Prevention, visit: [email or web address] OR  [email or web address] OR  https://www.cdc.gov/steadi/patient.html

## 2023-08-15 NOTE — DISCHARGE NOTE NURSING/CASE MANAGEMENT/SOCIAL WORK - PATIENT PORTAL LINK FT
You can access the FollowMyHealth Patient Portal offered by [facility] by registering at the following website: [email or web address]. By joining [facility]’s FollowMyHealth portal, you will also be able to view your health information using other applications (apps) compatible with our system.

## 2023-08-21 DIAGNOSIS — F20.9 SCHIZOPHRENIA, UNSPECIFIED: ICD-10-CM

## 2023-08-21 DIAGNOSIS — E83.42 HYPOMAGNESEMIA: ICD-10-CM

## 2023-08-21 DIAGNOSIS — Z79.84 LONG TERM (CURRENT) USE OF ORAL HYPOGLYCEMIC DRUGS: ICD-10-CM

## 2023-08-21 DIAGNOSIS — E11.9 TYPE 2 DIABETES MELLITUS WITHOUT COMPLICATIONS: ICD-10-CM

## 2023-08-21 DIAGNOSIS — E43 UNSPECIFIED SEVERE PROTEIN-CALORIE MALNUTRITION: ICD-10-CM

## 2023-08-21 DIAGNOSIS — Z79.82 LONG TERM (CURRENT) USE OF ASPIRIN: ICD-10-CM

## 2023-08-21 NOTE — ED ADULT NURSE NOTE - CAS TRG GENERAL AIRWAY, MLM
Orencia  Received: 4 days ago  Melissa Valiente, PRADEEP [facility] Nurse Msg Pool  Orencia Pen 125mg  (new insurance0 -  Approved - Pending Provider Response       Authorization Number: [id number]   Valid from 08/16/2023 to 02/16/2024       Will notify when script can be sent to pharmacy       Pharmacy Coverage [employer]     Patient's Co-Pay: $unknown, see notes below       Co pay Assistance Information   Assistance available through [employer]; additional information needed from patient, see below.         Additional Information:   ATTN POOL: Attempted to run test claim to determine patient out of pocket but test claim failed stating [employer] out of network. Called [facility] at [phone] & spoke with Quiana, she said she could not run test claim without script. Please advise if provider would like to send script to pharmacy so a out of pocket amount can be determined for patient and preservice can then start assistance process if needed.       Melissa Valiente   8/17/23        Patent

## 2024-01-26 NOTE — ED ADULT NURSE NOTE - NS ED NURSE DISCH DISPOSITION
- pt with known hx ETOH use d/o, multiple admissions in past for w/d; last drink 3d prior to arrival; drinks whiskey daily  - in ETOH w/d on arrival with tremors   - on CIWA protocol  - completing phenobarbital taper today  - encourage cessation   - SBIRT eval  - seizure/aspiration/fall precautions - pt with known hx ETOH use d/o, multiple admissions in past for w/d; last drink 3d prior to arrival; drinks whiskey daily  - in ETOH w/d on arrival with tremors   - on CIWA protocol  - completing phenobarbital taper on 1/29  - encourage cessation   - SBIRT eval  - seizure/aspiration/fall precautions  - if mental status does not improve by end of taper, will obtain CT head (confusion may be secondary to delirium vs alcohol withdrawal vs effects of phenobarbital) Discharged
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
## 2024-05-15 NOTE — ED ADULT TRIAGE NOTE - WEIGHT IN LBS
ID Consult Note:  --- Severe sepsis with septic shock, suspect intra-abdominal source  ----Nonspecific enterocolitis  ---Hepatitis ??  Shock liver  -----Rhabdomyolysis  ----Acute kidney injury on CRRT  -----Leukocytosis    Referring provider: Ami Knutson DO      History of present illness:  72-year-old male with history of hypertension, prediabetes, who originally admitted to the emergency room on 5/9/2024 with postprandial abdominal pain ongoing for many weeks.  He reported that he significantly decreased his oral intake due to pain.  He reported increasing weakness, headaches and dizziness.  He denied any fevers.  Reported difficulty ambulating due to weakness, presented to the emergency room.  In the ED he was hypotensive, afebrile.  His initial labs showed significantly elevated BUN of 71, creatinine 10.20, LFTs also elevated with T. bili of 1.3, AST 2209, , alkaline phosphatase 300.  Lactate was elevated at 4.8, CPK 63,196, troponins were also elevated.  UA showed mild pyuria, chest x-ray with no acute findings.  CT abdomen showed mild wall thickening of the colon and multiple small bowel loops suggesting possible enterocolitis.  Blood cultures were collected, he received ceftriaxone and Flagyl, he was transferred to ICU.  He was started on vasopressors, transitioned to cefepime and Flagyl.  Has been evaluated by nephrology, started on CRRT.  He has also been evaluated by GI, there are plans to do endoscopy once patient is stable.  All blood cultures and urine cultures have been negative, patient with increasing vasopressor requirement, cefepime and Flagyl was transitioned to Zosyn and vancomycin on 5/13, ID asked to see in consultation.      ROS:  Review of Systems   Constitutional:  Positive for activity change and fatigue. Negative for appetite change, fever and unexpected weight change.   HENT:  Negative for facial swelling, hearing loss, mouth sores, rhinorrhea, sinus pain, sore throat  and voice change.    Eyes:  Negative for photophobia, redness and visual disturbance.   Respiratory:  Negative for cough, shortness of breath and wheezing.    Cardiovascular:  Negative for chest pain and leg swelling.   Gastrointestinal:  Positive for abdominal distention. Negative for abdominal pain, blood in stool, diarrhea, nausea and vomiting.   Endocrine: Negative for heat intolerance and polyuria.   Genitourinary:  Negative for difficulty urinating, flank pain, genital sores and urgency.   Musculoskeletal:  Negative for back pain, joint swelling and neck stiffness.   Skin:  Positive for color change. Negative for rash and wound.   Allergic/Immunologic: Negative for immunocompromised state.   Neurological:  Positive for weakness. Negative for seizures, speech difficulty and headaches.   Hematological:  Negative for adenopathy.   Psychiatric/Behavioral:  Negative for confusion. The patient is not nervous/anxious.         Past Medical History:   Diagnosis Date    CVA (cerebral vascular accident)  (CMD) 2017    CVA diagnosed 2016: MRI Brain: Multiple small focal acute infarcts in the right temporal lobe and right posterior parietal lobe. Echo 16 Mild MS, trace MR, Normal LA size, normal LV. Carotid Doppler without significant stenosis.    Hyperlipoproteinemia     Hypertension 9/10/2020    Meds: Lisinopril 20 mg    Pre-diabetes     Prediabetes     A1c: 5.7       Past Surgical History:   Procedure Laterality Date    No past surgeries         Social History     Tobacco Use    Smoking status: Former     Current packs/day: 0.00     Average packs/day: 2.0 packs/day for 35.0 years (70.0 ttl pk-yrs)     Types: Cigarettes     Start date:      Quit date: 2016     Years since quittin.3    Smokeless tobacco: Never   Vaping Use    Vaping status: never used   Substance Use Topics    Alcohol use: Yes     Alcohol/week: 1.0 standard drink of alcohol     Types: 1 Cans of beer per week     Comment: social     Drug use: Never       ALLERGIES:  No Known Allergies    Family History   Problem Relation Age of Onset    Patient is unaware of any medical problems Mother     Patient is unaware of any medical problems Father     Cancer, Colon Neg Hx     Cancer, Prostate Neg Hx        Medications Prior to Admission   Medication Sig Dispense Refill    fluticasone-salmeterol (ADVAIR HFA) 45-21 MCG/ACT inhaler Inhale 2 puffs into the lungs 2 times daily. 12 g 11    albuterol 108 (90 Base) MCG/ACT inhaler Inhale 2 puffs into the lungs every 4 hours as needed for Shortness of Breath or Wheezing. 1 each 11    lisinopril (ZESTRIL) 20 MG tablet Take 1 tablet by mouth daily. 90 tablet 3    rosuvastatin (CRESTOR) 20 MG tablet Take 1 tablet by mouth daily. 90 tablet 3    aspirin 81 MG EC tablet Take 1 tablet by mouth daily. 90 tablet 3       Physical Examination:    Visit Vitals  BP (!) 90/58   Pulse (!) 110   Temp 97.7 °F (36.5 °C) (Axillary)   Resp (!) 34   Ht 5' 5\" (1.651 m)   Wt 71.2 kg (156 lb 15.5 oz)   SpO2 93%   BMI 26.12 kg/m²       Physical Exam  Vitals and nursing note reviewed.   Constitutional:       General: He is not in acute distress.     Appearance: He is well-developed. He is ill-appearing. He is not diaphoretic.   HENT:      Head: Normocephalic and atraumatic.      Mouth/Throat:      Pharynx: No oropharyngeal exudate or posterior oropharyngeal erythema.   Eyes:      General: No scleral icterus.     Conjunctiva/sclera: Conjunctivae normal.   Neck:      Comments: Right IJ trialysis catheter  Cardiovascular:      Rate and Rhythm: Regular rhythm. Tachycardia present.      Heart sounds: Normal heart sounds.   Pulmonary:      Effort: Pulmonary effort is normal. No respiratory distress.      Breath sounds: Rhonchi present. No wheezing.   Abdominal:      General: Bowel sounds are normal.      Palpations: Abdomen is soft.      Tenderness: There is no abdominal tenderness.   Genitourinary:     Comments: Indwelling Perez  catheter  Musculoskeletal:         General: No swelling or tenderness. Normal range of motion.      Cervical back: Neck supple.      Comments: Right hand gangrenous changes   Lymphadenopathy:      Cervical: No cervical adenopathy.   Skin:     General: Skin is warm and dry.      Findings: No erythema or rash.   Neurological:      Mental Status: He is alert and oriented to person, place, and time.   Psychiatric:         Behavior: Behavior normal.         Thought Content: Thought content normal.         Judgment: Judgment normal.          LABS  No results displayed because visit has over 200 results.          Cultures: Reviewed    Imaging:  XR ABDOMEN 1 VIEW    Result Date: 5/13/2024  Narrative: EXAM: XR ABDOMEN 1 VIEW  CLINICAL INDICATION: Feeding tube placement check COMPARISON: None. FINDINGS: Supine view of the abdomen shows tip of the feeding tube in the mid to distal stomach. Bowel gas pattern nonspecific.     Impression: 1.   Tip of the feeding tube in the mid to distal stomach. Electronically Signed by: RODRIGUEZ GARCIA M.D. Signed on: 5/13/2024 12:35 PM Workstation ID: [id number]    XR CHEST AP OR PA    Result Date: 5/13/2024  Narrative: EXAM: XR CHEST AP OR PA CLINICAL INDICATION: Dyspnea. Shortness of breath. COMPARISON: 5/11/2024     Impression: FINDINGS AND IMPRESSION: Frontal view of the chest was obtained. Enteric tube with tip in the stomach. Right IJ central venous catheter with tip at the cavoatrial junction. Cardiomediastinal silhouette is stable in appearance. Developed mild vascular congestion/pulmonary edema. Patchy and streaky opacities seen in the lower lungs which may represent atelectasis or infiltrate. Trace bilateral pleural effusions. No pneumothorax. Continued follow-up is recommended. Electronically Signed by: YUE HAWTHORNE M.D. Signed on: 5/13/2024 7:46 AM Workstation ID: [id number]    US VASC PORTAL HEPATIC DUPLEX ONLY COMPLETE    Result Date: 5/12/2024  Narrative: EXAM: US VASC PORTAL  HEPATIC DUPLEX ONLY COMPLETE  DATE: 5/12/2024 10:57 AM CLINICAL INDICATION: INCREASED LIVER ENZYMES COMPARISON: None. TECHNIQUE: Ultrasound real-time imaging is performed by the technologist. Representative images are submitted for interpretation. FINDINGS: Exam is limited by patient body habitus and bowel gas. Limited views of the portal vein and hepatic veins. Visualized main portal vein is patent and shows hepatopetal flow. Systolic velocity measures 23 cm/s (within normal limits). Limited views of the hepatic artery shows flow with spectral Doppler. Visualized splenic vein is patent by color and spectral Doppler.     Impression: 1.   Limited exam. 2.   Visualized segments are patent as described. FOR PHYSICIAN USE ONLY: Please note that this report was generated using voice recognition software.  If you require clarification or feel that there has been an error in this report please contact me. Electronically Signed by: AXEL DOTSON D.O. Signed on: 5/12/2024 4:58 PM Workstation ID: [id number]    US VASC UPPER EXTREMITY ARTERIAL DUPLEX    Result Date: 5/12/2024  Narrative: EXAM: US VASC UPPER EXTREMITY ARTERIAL DUPLEX RIGHT   DATE: 5/12/2024 CLINICAL INDICATION: Absent peripheral right upper extremity pulses. COMPARISON: None. TECHNIQUE: Ultrasound vascular right upper extremity arterial duplex. FINDINGS: Ultrasound arterial imaging of the right upper extremity with color and spectral Doppler. Visualized segments of the right subclavian, axillary, brachial, radial and ulnar arteries are patent by color and spectral Doppler. Monophasic low resistive waveform right radial artery mid-distal.     Impression: Visualized segments are patent. Monophasic low resistive waveform mid-distal right radial artery. Electronically Signed by: AXEL DOTSON D.O. Signed on: 5/12/2024 1:42 PM Workstation ID: [id number]    XR CHEST POST TUBE OR LINE PLACEMENT 1 VIEW    Result Date: 5/11/2024  Narrative: EXAM: XR CHEST  POST TUBE OR LINE PLACEMENT  CLINICAL INDICATION: S/P Dobb cheri placement. COMPARISON: 5/10/2024 FINDINGS: Portable AP semiupright chest image obtained. Dobbhoff tube tip is below the field-of-view, beyond the mid gastric projection. Right IJ line tip in the SVC projection. Redemonstrated bilateral interstitial opacities. No large volume pleural effusion noted. Cardiac silhouette is prominent. Mild vascular congestion noted.     Impression: 1. No significant change of bilateral interstitial opacities. **The absence of findings should not deter follow-up or further evaluation of concerning clinical findings. FOR PHYSICIAN USE ONLY: Please note that this report was generated using voice recognition software and may contain errors.  If you require clarification or feel that there is an error in this report, please contact me. Electronically Signed by: TAVARES NEAL M.D. Signed on: 5/11/2024 10:21 AM Workstation ID: [id number]    XR CHEST AP OR PA    Result Date: 5/10/2024  Narrative: EXAM: XR CHEST AP OR PA CLINICAL INDICATION: Shortness of breath. Crackles. COMPARISON: 5/9/2024 FINDINGS: Frontal view of the chest was obtained. Stable appearance of right IJ hemodialysis catheter with tip at the cavoatrial junction. Mild cardiomegaly. Interval development of hilar fullness and indistinctness of the interstitium suggesting vascular congestion/pulmonary edema. No large volume pleural effusion. No pneumothorax.     Impression: Developing hilar fullness and indistinctness of the interstitium suggesting vascular congestion/pulmonary edema. Electronically Signed by: YUE HAWTHORNE M.D. Signed on: 5/10/2024 7:30 PM Workstation ID: [id number]    US LIVER GALLBLADDER PANCREAS (RUQ)    Result Date: 5/9/2024  Narrative: EXAM: US LIVER GALLBLADDER PANCREAS (RUQ) CLINICAL INDICATION: Abdominal pain. Cholecystitis. COMPARISON: CT abdomen and pelvis 5/9/2024 FINDINGS: Limited study due to patient body habitus and limited mobility.  Nonvisualization of the pancreas due to overlying bowel gas. Liver: Generalized heterogeneous parenchymal pattern without focal mass identified. Liver surface is nodular compatible with cirrhotic morphology. Main portal vein is patent with hepatopetal flow. No findings of hepatomegaly. Periportal ascites is noted. Limited evaluation of the biliary ductal system. There is no intrahepatic biliary ductal dilatation although the extrahepatic common bile duct is upper limits of normal at 0.58 cm. The gallbladder contains multiple intraluminal echogenic foci with posterior acoustical shadowing consistent with gallstones. The gallbladder wall is thickened measuring 0.57 cm. No definite pericholecystic fluid.     Impression: 1. Limited study. 2. Cirrhotic liver morphology. 3. Cholelithiasis with thickened gallbladder wall. This has been described with chronic cholecystitis. Acute cholecystitis not fully excluded. Further evaluation with nuclear medicine HIDA scan should be considered. 4. Perihepatic ascites. Electronically Signed by: MELISA MAZA M.D. Signed on: 5/9/2024 5:09 PM Workstation ID: [id number]    XR CHEST AP OR PA    Result Date: 5/9/2024  Narrative: EXAM: XR CHEST AP OR PA  CLINICAL INDICATION: Central venous catheter placement COMPARISON: 5/9/2024 FINDINGS: Portable chest radiograph shows new right IJ central venous catheter with tip in the right atrial level. No evidence of pneumothorax. Heart size and pulmonary vasculature are stable. No infiltrate or effusion. Osseous structures showed no acute finding.     Impression: 1.   New right IJ central venous catheter without pneumothorax. 2.   Otherwise stable exam without acute pulmonary findings. Electronically Signed by: RODRIGUEZ GARCIA M.D. Signed on: 5/9/2024 3:17 PM Workstation ID: [id number]    CT ABDOMEN PELVIS WO CONTRAST    Result Date: 5/9/2024  Narrative: EXAM: CT ABDOMEN PELVIS WO CONTRAST CLINICAL INDICATION: abd pain COMPARISON: 7/15/2021  TECHNIQUE: Routine axial acquisitions were obtained through the abdomen, and pelvis without administration of intravenous contrast material in standard protocol. Oral contrast material was not administered. Coronal and sagittal reformats were produced.  Automated dose reduction techniques utilized. FINDINGS: Exam is limited by motion, noncontrast technique, and arm related artifact. Lung bases show patchy posterior atelectasis versus mild pneumonia with bronchial wall thickening and emphysematous changes. Chronic aspiration should be clinically excluded. There is advanced coronary atherosclerosis and cardiomegaly. CT-  Abdomen - Kidneys show no renal or ureteral calculi or hydronephrosis. Evaluation of the solid organs is limited by lack of intravenous contrast material. The gallbladder is mildly distended with intraluminal small subcentimeter calculus noted.. The liver, spleen, pancreas and adrenal glands demonstrate unremarkable noncontrast appearance. The stomach is grossly unremarkable. No dilated loops or wall thickening of small or large bowel is identified. Mild wall thickening of the colon appears present diffusely suggesting possible colitis. Slight wall prominence of the multiple small bowel loops also appears present. This is not well evaluated without IV or oral contrast. No significant pathological lymph node enlargement identified. There is mild perihepatic ascites.. CT - Pelvis - No abnormal pelvic mass, fluid collection, or significant pelvic lymph node enlargement is noted. Trace pelvic ascites noted. Redemonstrated is extensive uncomplicated colonic diverticulosis. Appendix is normal. No worrisome lytic or sclerotic osseous lesions are identified. Degenerative changes and scoliosis of the spine are present.     Impression: 1.   Limited exam due to motion, noncontrast technique, and arm related artifact. 2.   Mild wall thickening of the colon and multiple small bowel loops suggesting possible  enterocolitis. No bowel obstruction, free air or abscess. 3.   Mild perihepatic and pelvic ascites. 4.   Extensive uncomplicated colonic diverticulosis. 5.   Cholelithiasis. 6.   Patchy posterior atelectasis versus mild pneumonia with bronchial wall thickening and emphysematous changes. 7.   Advanced coronary atherosclerosis and cardiomegaly. Electronically Signed by: MARIAM SAUER M.D. Signed on: 5/9/2024 10:25 AM Workstation ID: [id number]    CT HEAD WO CONTRAST    Result Date: 5/9/2024  Narrative: EXAM: CT HEAD WO CONTRAST CLINICAL INDICATION: headache COMPARISON: None available at the time of interpretation. TECHNIQUE: Helical images obtained from skull base to vertex without IV contrast. Coronal and sagittal reformats were produced.  Automated dose reduction techniques utilized. FINDINGS: The ventricles and cortical sulci are mildly enlarged.  No midline shift or mass effect.  No acute intra or extra axial hemorrhage. There is patchy hypodensity in the periventricular and subcortical white matter. The skull base and calvarium appear intact.  The visualized paranasal sinuses are grossly clear.  The mastoid air cells are grossly clear.      Impression: 1.   No acute intracranial hemorrhage, mass effect or midline shift. 2.   Mild atrophy and chronic small vessel ischemic changes. Electronically Signed by: MARIAM SAUER M.D. Signed on: 5/9/2024 10:14 AM Workstation ID: [id number]    XR CHEST PA OR AP 1 VIEW    Result Date: 5/9/2024  Narrative: EXAM: XR CHEST AP OR PA CLINICAL INDICATION: Weakness COMPARISON: 9/9/2019 FINDINGS: Study limited by poor inspiratory effort. The heart is borderline in size. Normal caliber pulmonary vascularity. No focal airspace consolidation or evidence for acute lobar pneumonia. No findings of pleural effusion. Visualized osseous structures unremarkable.     Impression: 1. No acute findings. Clear lungs. Electronically Signed by: MELISA MAZA M.D. Signed on:  5/9/2024 8:42 AM Workstation ID: [id number]      Assessment:  This is Rosalio Donahue 72 year old admitted on 5/9/2024 for the following:  --- Severe sepsis with septic shock, suspect intra-abdominal source  ----Nonspecific enterocolitis  ---Hepatitis ??  Shock liver  -----Rhabdomyolysis  ----Acute kidney injury on CRRT  -----Leukocytosis  ----Concern for developing nosocomial pneumonia    Plan:  ----Patient currently on Zosyn and vancomycin, all blood cultures have been negative, suspect source of sepsis likely intra-abdominal, will continue for now.  ----Blood cultures were repeated 5/14/2024, if they remain negative, vancomycin can probably be discontinued, MRSA nasal screen negative  ----I will check Leptospira IgM rule out leptospirosis in the setting of severe rhabdomyolysis, hepatitis, acute kidney injury with no clear source so far of identified  --- I will add doxycycline for now.  ----Continue supportive care per ICU  ----CRRT per nephrology  ----GI planning endoscopy once stable  ----We will follow with you      Code Status    Code Status: Full Resuscitation    Aldo Pat MD    149.9

## 2024-06-17 NOTE — ED ADULT TRIAGE NOTE - HEIGHT IN CM
Cimzia Pregnancy And Lactation Text: This medication crosses the placenta but can be considered safe in certain situations. Cimzia may be excreted in breast milk. Arava Pregnancy And Lactation Text: This medication is Pregnancy Category X and is absolutely contraindicated during pregnancy. It is unknown if it is excreted in breast milk. Isotretinoin Counseling: Patient should get monthly blood tests, not donate blood, not drive at night if vision affected, not share medication, and not undergo elective surgery for 6 months after tx completed. Side effects reviewed, pt to contact office should one occur. Elidel Counseling: Patient may experience a mild burning sensation during topical application. Elidel is not approved in children less than 2 years of age. There have been case reports of hematologic and skin malignancies in patients using topical calcineurin inhibitors although causality is questionable. Spironolactone Counseling: Patient advised regarding risks of diarrhea, abdominal pain, hyperkalemia, birth defects (for female patients), liver toxicity and renal toxicity. The patient may need blood work to monitor liver and kidney function and potassium levels while on therapy. The patient verbalized understanding of the proper use and possible adverse effects of spironolactone.  All of the patient's questions and concerns were addressed. Xelashelyz Pregnancy And Lactation Text: This medication is Pregnancy Category D and is not considered safe during pregnancy.  The risk during breast feeding is also uncertain. Drysol Pregnancy And Lactation Text: This medication is considered safe during pregnancy and breast feeding. Dapsone Pregnancy And Lactation Text: This medication is Pregnancy Category C and is not considered safe during pregnancy or breast feeding. Gabapentin Pregnancy And Lactation Text: This medication is Pregnancy Category C and isn't considered safe during pregnancy. It is excreted in breast milk. Topical Steroids Applications Pregnancy And Lactation Text: Most topical steroids are considered safe to use during pregnancy and lactation.  Any topical steroid applied to the breast or nipple should be washed off before breastfeeding. Oral Minoxidil Pregnancy And Lactation Text: This medication should only be used when clearly needed if you are pregnant, attempting to become pregnant or breast feeding. Benzoyl Peroxide Counseling: Patient counseled that medicine may cause skin irritation and bleach clothing.  In the event of skin irritation, the patient was advised to reduce the amount of the drug applied or use it less frequently.   The patient verbalized understanding of the proper use and possible adverse effects of benzoyl peroxide.  All of the patient's questions and concerns were addressed. Hydroquinone Counseling:  Patient advised that medication may result in skin irritation, lightening (hypopigmentation), dryness, and burning.  In the event of skin irritation, the patient was advised to reduce the amount of the drug applied or use it less frequently.  Rarely, spots that are treated with hydroquinone can become darker (pseudoochronosis).  Should this occur, patient instructed to stop medication and call the office. The patient verbalized understanding of the proper use and possible adverse effects of hydroquinone.  All of the patient's questions and concerns were addressed. Low Dose Naltrexone Counseling- I discussed with the patient the potential risks and side effects of low dose naltrexone including but not limited to: more vivid dreams, headaches, nausea, vomiting, abdominal pain, fatigue, dizziness, and anxiety. Tranexamic Acid Counseling:  Patient advised of the small risk of bleeding problems with tranexamic acid. They were also instructed to call if they developed any nausea, vomiting or diarrhea. All of the patient's questions and concerns were addressed. Topical Retinoid Pregnancy And Lactation Text: This medication is Pregnancy Category C. It is unknown if this medication is excreted in breast milk. Cimzia Counseling:  I discussed with the patient the risks of Cimzia including but not limited to immunosuppression, allergic reactions and infections.  The patient understands that monitoring is required including a PPD at baseline and must alert us or the primary physician if symptoms of infection or other concerning signs are noted. 177.8 Picato Counseling:  I discussed with the patient the risks of Picato including but not limited to erythema, scaling, itching, weeping, crusting, and pain. Opzelura Pregnancy And Lactation Text: There is insufficient data to evaluate drug-associated risk for major birth defects, miscarriage, or other adverse maternal or fetal outcomes.  There is a pregnancy registry that monitors pregnancy outcomes in pregnant persons exposed to the medication during pregnancy.  It is unknown if this medication is excreted in breast milk.  Do not breastfeed during treatment and for about 4 weeks after the last dose. Bexarotene Pregnancy And Lactation Text: This medication is Pregnancy Category X and should not be given to women who are pregnant or may become pregnant. This medication should not be used if you are breast feeding. Calcipotriene Pregnancy And Lactation Text: The use of this medication during pregnancy or lactation is not recommended as there is insufficient data. Use Enhanced Medication Counseling?: No Minoxidil Counseling: Minoxidil is a topical medication which can increase blood flow where it is applied. It is uncertain how this medication increases hair growth. Side effects are uncommon and include stinging and allergic reactions. Simponi Pregnancy And Lactation Text: The risk during pregnancy and breastfeeding is uncertain with this medication. Topical Steroids Counseling: I discussed with the patient that prolonged use of topical steroids can result in the increased appearance of superficial blood vessels (telangiectasias), lightening (hypopigmentation) and thinning of the skin (atrophy).  Patient understands to avoid using high potency steroids in skin folds, the groin or the face.  The patient verbalized understanding of the proper use and possible adverse effects of topical steroids.  All of the patient's questions and concerns were addressed. Oral Minoxidil Counseling- I discussed with the patient the risks of oral minoxidil including but not limited to shortness of breath, swelling of the feet or ankles, dizziness, lightheadedness, unwanted hair growth and allergic reaction.  The patient verbalized understanding of the proper use and possible adverse effects of oral minoxidil.  All of the patient's questions and concerns were addressed. Dapsone Counseling: I discussed with the patient the risks of dapsone including but not limited to hemolytic anemia, agranulocytosis, rashes, methemoglobinemia, kidney failure, peripheral neuropathy, headaches, GI upset, and liver toxicity.  Patients who start dapsone require monitoring including baseline LFTs and weekly CBCs for the first month, then every month thereafter.  The patient verbalized understanding of the proper use and possible adverse effects of dapsone.  All of the patient's questions and concerns were addressed. Terbinafine Pregnancy And Lactation Text: This medication is Pregnancy Category B and is considered safe during pregnancy. It is also excreted in breast milk and breast feeding isn't recommended. Rinvoq Counseling: I discussed with the patient the risks of Rinvoq therapy including but not limited to upper respiratory tract infections, shingles, cold sores, bronchitis, nausea, cough, fever, acne, and headache. Live vaccines should be avoided.  This medication has been linked to serious infections; higher rate of mortality; malignancy and lymphoproliferative disorders; major adverse cardiovascular events; thrombosis; thrombocytopenia, anemia, and neutropenia; lipid elevations; liver enzyme elevations; and gastrointestinal perforations. Otezla Counseling: The side effects of Otezla were discussed with the patient, including but not limited to worsening or new depression, weight loss, diarrhea, nausea, upper respiratory tract infection, and headache. Patient instructed to call the office should any adverse effect occur.  The patient verbalized understanding of the proper use and possible adverse effects of Otezla.  All the patient's questions and concerns were addressed. Topical Ketoconazole Counseling: Patient counseled that this medication may cause skin irritation or allergic reactions.  In the event of skin irritation, the patient was advised to reduce the amount of the drug applied or use it less frequently.   The patient verbalized understanding of the proper use and possible adverse effects of ketoconazole.  All of the patient's questions and concerns were addressed. Sski Pregnancy And Lactation Text: This medication is Pregnancy Category D and isn't considered safe during pregnancy. It is excreted in breast milk. Simponi Counseling:  I discussed with the patient the risks of golimumab including but not limited to myelosuppression, immunosuppression, autoimmune hepatitis, demyelinating diseases, lymphoma, and serious infections.  The patient understands that monitoring is required including a PPD at baseline and must alert us or the primary physician if symptoms of infection or other concerning signs are noted. Dutasteride Female Counseling: Dutasteride Counseling:  I discussed with the patient the risks of use of dutasteride including but not limited to decreased libido and sexual dysfunction. Explained the teratogenic nature of the medication and stressed the importance of not getting pregnant during treatment. All of the patient's questions and concerns were addressed. Rhofade Counseling: Rhofade is a topical medication which can decrease superficial blood flow where applied. Side effects are uncommon and include stinging, redness and allergic reactions. Albendazole Counseling:  I discussed with the patient the risks of albendazole including but not limited to cytopenia, kidney damage, nausea/vomiting and severe allergy.  The patient understands that this medication is being used in an off-label manner. Propranolol Counseling:  I discussed with the patient the risks of propranolol including but not limited to low heart rate, low blood pressure, low blood sugar, restlessness and increased cold sensitivity. They should call the office if they experience any of these side effects. Topical Metronidazole Counseling: Metronidazole is a topical antibiotic medication. You may experience burning, stinging, redness, or allergic reactions.  Please call our office if you develop any problems from using this medication. Ketoconazole Pregnancy And Lactation Text: This medication is Pregnancy Category C and it isn't know if it is safe during pregnancy. It is also excreted in breast milk and breast feeding isn't recommended. Cyclosporine Pregnancy And Lactation Text: This medication is Pregnancy Category C and it isn't know if it is safe during pregnancy. This medication is excreted in breast milk. Calcipotriene Counseling:  I discussed with the patient the risks of calcipotriene including but not limited to erythema, scaling, itching, and irritation. Cellcept Pregnancy And Lactation Text: This medication is Pregnancy Category D and isn't considered safe during pregnancy. It is unknown if this medication is excreted in breast milk. Xolair Pregnancy And Lactation Text: This medication is Pregnancy Category B and is considered safe during pregnancy. This medication is excreted in breast milk. Adbry Pregnancy And Lactation Text: It is unknown if this medication will adversely affect pregnancy or breast feeding. Mirvaso Pregnancy And Lactation Text: This medication has not been assigned a Pregnancy Risk Category. It is unknown if the medication is excreted in breast milk. Xeljanz Counseling: I discussed with the patient the risks of Xeljanz therapy including increased risk of infection, liver issues, headache, diarrhea, or cold symptoms. Live vaccines should be avoided. They were instructed to call if they have any problems. Gabapentin Counseling: I discussed with the patient the risks of gabapentin including but not limited to dizziness, somnolence, fatigue and ataxia. Fluconazole Pregnancy And Lactation Text: This medication is Pregnancy Category C and it isn't know if it is safe during pregnancy. It is also excreted in breast milk. Finasteride Male Counseling: Finasteride Counseling:  I discussed with the patient the risks of use of finasteride including but not limited to decreased libido, decreased ejaculate volume, gynecomastia, and depression. Women should not handle medication.  All of the patient's questions and concerns were addressed. Hyrimoz Pregnancy And Lactation Text: This medication is Pregnancy Category B and is considered safe during pregnancy. It is unknown if this medication is excreted in breast milk. Dupixent Pregnancy And Lactation Text: This medication likely crosses the placenta but the risk for the fetus is uncertain. This medication is excreted in breast milk. Litfulo Counseling: I discussed with the patient the risks of Litfulo therapy including but not limited to upper respiratory tract infections, shingles, cold sores, and nausea. Live vaccines should be avoided.  This medication has been linked to serious infections; higher rate of mortality; malignancy and lymphoproliferative disorders; major adverse cardiovascular events; thrombosis; gastrointestinal perforations; neutropenia; lymphopenia; anemia; liver enzyme elevations; and lipid elevations. Acitretin Pregnancy And Lactation Text: This medication is Pregnancy Category X and should not be given to women who are pregnant or may become pregnant in the future. This medication is excreted in breast milk. Tremfya Counseling: I discussed with the patient the risks of guselkumab including but not limited to immunosuppression, serious infections, worsening of inflammatory bowel disease and drug reactions.  The patient understands that monitoring is required including a PPD at baseline and must alert us or the primary physician if symptoms of infection or other concerning signs are noted. Topical Clindamycin Pregnancy And Lactation Text: This medication is Pregnancy Category B and is considered safe during pregnancy. It is unknown if it is excreted in breast milk. Klisyri Pregnancy And Lactation Text: It is unknown if this medication can harm a developing fetus or if it is excreted in breast milk. Cantharidin Pregnancy And Lactation Text: This medication has not been proven safe during pregnancy. It is unknown if this medication is excreted in breast milk. Bexarotene Counseling:  I discussed with the patient the risks of bexarotene including but not limited to hair loss, dry lips/skin/eyes, liver abnormalities, hyperlipidemia, pancreatitis, depression/suicidal ideation, photosensitivity, drug rash/allergic reactions, hypothyroidism, anemia, leukopenia, infection, cataracts, and teratogenicity.  Patient understands that they will need regular blood tests to check lipid profile, liver function tests, white blood cell count, thyroid function tests and pregnancy test if applicable. Libtayo Pregnancy And Lactation Text: This medication is contraindicated in pregnancy and when breast feeding. Low Dose Naltrexone Pregnancy And Lactation Text: Naltrexone is pregnancy category C.  There have been no adequate and well-controlled studies in pregnant women.  It should be used in pregnancy only if the potential benefit justifies the potential risk to the fetus.   Limited data indicates that naltrexone is minimally excreted into breastmilk. SSKI Counseling:  I discussed with the patient the risks of SSKI including but not limited to thyroid abnormalities, metallic taste, GI upset, fever, headache, acne, arthralgias, paraesthesias, lymphadenopathy, easy bleeding, arrhythmias, and allergic reaction. Ivermectin Counseling:  Patient instructed to take medication on an empty stomach with a full glass of water.  Patient informed of potential adverse effects including but not limited to nausea, diarrhea, dizziness, itching, and swelling of the extremities or lymph nodes.  The patient verbalized understanding of the proper use and possible adverse effects of ivermectin.  All of the patient's questions and concerns were addressed. Opioid Counseling: I discussed with the patient the potential side effects of opioids including but not limited to addiction, altered mental status, and depression. I stressed avoiding alcohol, benzodiazepines, muscle relaxants and sleep aids unless specifically okayed by a physician. The patient verbalized understanding of the proper use and possible adverse effects of opioids. All of the patient's questions and concerns were addressed. They were instructed to flush the remaining pills down the toilet if they did not need them for pain. Topical Sulfur Applications Pregnancy And Lactation Text: This medication is Pregnancy Category C and has an unknown safety profile during pregnancy. It is unknown if this topical medication is excreted in breast milk. Wartpeel Pregnancy And Lactation Text: This medication is Pregnancy Category X and contraindicated in pregnancy and in women who may become pregnant. It is unknown if this medication is excreted in breast milk. Solaraze Counseling:  I discussed with the patient the risks of Solaraze including but not limited to erythema, scaling, itching, weeping, crusting, and pain. High Dose Vitamin A Counseling: Side effects reviewed, pt to contact office should one occur. Soolantra Counseling: I discussed with the patients the risks of topial Soolantra. This is a medicine which decreases the number of mites and inflammation in the skin. You experience burning, stinging, eye irritation or allergic reactions.  Please call our office if you develop any problems from using this medication. Enbrel Counseling:  I discussed with the patient the risks of etanercept including but not limited to myelosuppression, immunosuppression, autoimmune hepatitis, demyelinating diseases, lymphoma, and infections.  The patient understands that monitoring is required including a PPD at baseline and must alert us or the primary physician if symptoms of infection or other concerning signs are noted. VTAMA Counseling: I discussed with the patient that VTAMA is not for use in the eyes, mouth or mouth. They should call the office if they develop any signs of allergic reactions to VTAMA. The patient verbalized understanding of the proper use and possible adverse effects of VTAMA.  All of the patient's questions and concerns were addressed. Fluconazole Counseling:  Patient counseled regarding adverse effects of fluconazole including but not limited to headache, diarrhea, nausea, upset stomach, liver function test abnormalities, taste disturbance, and stomach pain.  There is a rare possibility of liver failure that can occur when taking fluconazole.  The patient understands that monitoring of LFTs and kidney function test may be required, especially at baseline. The patient verbalized understanding of the proper use and possible adverse effects of fluconazole.  All of the patient's questions and concerns were addressed. Tranexamic Acid Pregnancy And Lactation Text: It is unknown if this medication is safe during pregnancy or breast feeding. Tazorac Pregnancy And Lactation Text: This medication is not safe during pregnancy. It is unknown if this medication is excreted in breast milk. Siliq Counseling:  I discussed with the patient the risks of Siliq including but not limited to new or worsening depression, suicidal thoughts and behavior, immunosuppression, malignancy, posterior leukoencephalopathy syndrome, and serious infections.  The patient understands that monitoring is required including a PPD at baseline and must alert us or the primary physician if symptoms of infection or other concerning signs are noted. There is also a special program designed to monitor depression which is required with Siliq. Isotretinoin Pregnancy And Lactation Text: This medication is Pregnancy Category X and is considered extremely dangerous during pregnancy. It is unknown if it is excreted in breast milk. Tetracycline Counseling: Patient counseled regarding possible photosensitivity and increased risk for sunburn.  Patient instructed to avoid sunlight, if possible.  When exposed to sunlight, patients should wear protective clothing, sunglasses, and sunscreen.  The patient was instructed to call the office immediately if the following severe adverse effects occur:  hearing changes, easy bruising/bleeding, severe headache, or vision changes.  The patient verbalized understanding of the proper use and possible adverse effects of tetracycline.  All of the patient's questions and concerns were addressed. Patient understands to avoid pregnancy while on therapy due to potential birth defects. Finasteride Female Counseling: Finasteride Counseling:  I discussed with the patient the risks of use of finasteride including but not limited to decreased libido and sexual dysfunction. Explained the teratogenic nature of the medication and stressed the importance of not getting pregnant during treatment. All of the patient's questions and concerns were addressed. Tazorac Counseling:  Patient advised that medication is irritating and drying.  Patient may need to apply sparingly and wash off after an hour before eventually leaving it on overnight.  The patient verbalized understanding of the proper use and possible adverse effects of tazorac.  All of the patient's questions and concerns were addressed. Bactrim Counseling:  I discussed with the patient the risks of sulfa antibiotics including but not limited to GI upset, allergic reaction, drug rash, diarrhea, dizziness, photosensitivity, and yeast infections.  Rarely, more serious reactions can occur including but not limited to aplastic anemia, agranulocytosis, methemoglobinemia, blood dyscrasias, liver or kidney failure, lung infiltrates or desquamative/blistering drug rashes. Carac Counseling:  I discussed with the patient the risks of Carac including but not limited to erythema, scaling, itching, weeping, crusting, and pain. Eucrisa Counseling: Patient may experience a mild burning sensation during topical application. Eucrisa is not approved in children less than 2 years of age. Thalidomide Counseling: I discussed with the patient the risks of thalidomide including but not limited to birth defects, anxiety, weakness, chest pain, dizziness, cough and severe allergy. Odomzo Counseling- I discussed with the patient the risks of Odomzo including but not limited to nausea, vomiting, diarrhea, constipation, weight loss, changes in the sense of taste, decreased appetite, muscle spasms, and hair loss.  The patient verbalized understanding of the proper use and possible adverse effects of Odomzo.  All of the patient's questions and concerns were addressed. Aklief Pregnancy And Lactation Text: It is unknown if this medication is safe to use during pregnancy.  It is unknown if this medication is excreted in breast milk.  Breastfeeding women should use the topical cream on the smallest area of the skin for the shortest time needed while breastfeeding.  Do not apply to nipple and areola. Cosentyx Counseling:  I discussed with the patient the risks of Cosentyx including but not limited to worsening of Crohn's disease, immunosuppression, allergic reactions and infections.  The patient understands that monitoring is required including a PPD at baseline and must alert us or the primary physician if symptoms of infection or other concerning signs are noted. Topical Clindamycin Counseling: Patient counseled that this medication may cause skin irritation or allergic reactions.  In the event of skin irritation, the patient was advised to reduce the amount of the drug applied or use it less frequently.   The patient verbalized understanding of the proper use and possible adverse effects of clindamycin.  All of the patient's questions and concerns were addressed. Cyclosporine Counseling:  I discussed with the patient the risks of cyclosporine including but not limited to hypertension, gingival hyperplasia,myelosuppression, immunosuppression, liver damage, kidney damage, neurotoxicity, lymphoma, and serious infections. The patient understands that monitoring is required including baseline blood pressure, CBC, CMP, lipid panel and uric acid, and then 1-2 times monthly CMP and blood pressure. Winlevi Counseling:  I discussed with the patient the risks of topical clascoterone including but not limited to erythema, scaling, itching, and stinging. Patient voiced their understanding. Clindamycin Pregnancy And Lactation Text: This medication can be used in pregnancy if certain situations. Clindamycin is also present in breast milk. Methotrexate Pregnancy And Lactation Text: This medication is Pregnancy Category X and is known to cause fetal harm. This medication is excreted in breast milk. Sarecycline Counseling: Patient advised regarding possible photosensitivity and discoloration of the teeth, skin, lips, tongue and gums.  Patient instructed to avoid sunlight, if possible.  When exposed to sunlight, patients should wear protective clothing, sunglasses, and sunscreen.  The patient was instructed to call the office immediately if the following severe adverse effects occur:  hearing changes, easy bruising/bleeding, severe headache, or vision changes.  The patient verbalized understanding of the proper use and possible adverse effects of sarecycline.  All of the patient's questions and concerns were addressed. Klisyri Counseling:  I discussed with the patient the risks of Klisyri including but not limited to erythema, scaling, itching, weeping, crusting, and pain. Taltz Counseling: I discussed with the patient the risks of ixekizumab including but not limited to immunosuppression, serious infections, worsening of inflammatory bowel disease and drug reactions.  The patient understands that monitoring is required including a PPD at baseline and must alert us or the primary physician if symptoms of infection or other concerning signs are noted. Dutasteride Pregnancy And Lactation Text: This medication is absolutely contraindicated in women, especially during pregnancy and breast feeding. Feminization of male fetuses is possible if taking while pregnant. Nsaids Pregnancy And Lactation Text: These medications are considered safe up to 30 weeks gestation. It is excreted in breast milk. Doxepin Pregnancy And Lactation Text: This medication is Pregnancy Category C and it isn't known if it is safe during pregnancy. It is also excreted in breast milk and breast feeding isn't recommended. Detail Level: Simple Topical Metronidazole Pregnancy And Lactation Text: This medication is Pregnancy Category B and considered safe during pregnancy.  It is also considered safe to use while breastfeeding. Niacinamide Pregnancy And Lactation Text: These medications are considered safe during pregnancy. Zyclara Counseling:  I discussed with the patient the risks of imiquimod including but not limited to erythema, scaling, itching, weeping, crusting, and pain.  Patient understands that the inflammatory response to imiquimod is variable from person to person and was educated regarded proper titration schedule.  If flu-like symptoms develop, patient knows to discontinue the medication and contact us. Propranolol Pregnancy And Lactation Text: This medication is Pregnancy Category C and it isn't known if it is safe during pregnancy. It is excreted in breast milk. Rinvoq Pregnancy And Lactation Text: Based on animal studies, Rinvoq may cause embryo-fetal harm when administered to pregnant women.  The medication should not be used in pregnancy.  Breastfeeding is not recommended during treatment and for 6 days after the last dose. Stelara Counseling:  I discussed with the patient the risks of ustekinumab including but not limited to immunosuppression, malignancy, posterior leukoencephalopathy syndrome, and serious infections.  The patient understands that monitoring is required including a PPD at baseline and must alert us or the primary physician if symptoms of infection or other concerning signs are noted. Topical Sulfur Applications Counseling: Topical Sulfur Counseling: Patient counseled that this medication may cause skin irritation or allergic reactions.  In the event of skin irritation, the patient was advised to reduce the amount of the drug applied or use it less frequently.   The patient verbalized understanding of the proper use and possible adverse effects of topical sulfur application.  All of the patient's questions and concerns were addressed. Erivedge Counseling- I discussed with the patient the risks of Erivedge including but not limited to nausea, vomiting, diarrhea, constipation, weight loss, changes in the sense of taste, decreased appetite, muscle spasms, and hair loss.  The patient verbalized understanding of the proper use and possible adverse effects of Erivedge.  All of the patient's questions and concerns were addressed. Metronidazole Pregnancy And Lactation Text: This medication is Pregnancy Category B and considered safe during pregnancy.  It is also excreted in breast milk. Zoryve Pregnancy And Lactation Text: It is unknown if this medication can cause problems during pregnancy and breastfeeding. Spironolactone Pregnancy And Lactation Text: This medication can cause feminization of the male fetus and should be avoided during pregnancy. The active metabolite is also found in breast milk. Cyclophosphamide Pregnancy And Lactation Text: This medication is Pregnancy Category D and it isn't considered safe during pregnancy. This medication is excreted in breast milk. Cibinqo Counseling: I discussed with the patient the risks of Cibinqo therapy including but not limited to common cold, nausea, headache, cold sores, increased blood CPK levels, dizziness, UTIs, fatigue, acne, and vomitting. Live vaccines should be avoided.  This medication has been linked to serious infections; higher rate of mortality; malignancy and lymphoproliferative disorders; major adverse cardiovascular events; thrombosis; thrombocytopenia and lymphopenia; lipid elevations; and retinal detachment. Birth Control Pills Counseling: Birth Control Pill Counseling: I discussed with the patient the potential side effects of OCPs including but not limited to increased risk of stroke, heart attack, thrombophlebitis, deep venous thrombosis, hepatic adenomas, breast changes, GI upset, headaches, and depression.  The patient verbalized understanding of the proper use and possible adverse effects of OCPs. All of the patient's questions and concerns were addressed. Cephalexin Counseling: I counseled the patient regarding use of cephalexin as an antibiotic for prophylactic and/or therapeutic purposes. Cephalexin (commonly prescribed under brand name Keflex) is a cephalosporin antibiotic which is active against numerous classes of bacteria, including most skin bacteria. Side effects may include nausea, diarrhea, gastrointestinal upset, rash, hives, yeast infections, and in rare cases, hepatitis, kidney disease, seizures, fever, confusion, neurologic symptoms, and others. Patients with severe allergies to penicillin medications are cautioned that there is about a 10% incidence of cross-reactivity with cephalosporins. When possible, patients with penicillin allergies should use alternatives to cephalosporins for antibiotic therapy. Doxycycline Pregnancy And Lactation Text: This medication is Pregnancy Category D and not consider safe during pregnancy. It is also excreted in breast milk but is considered safe for shorter treatment courses. Olumiant Counseling: I discussed with the patient the risks of Olumiant therapy including but not limited to upper respiratory tract infections, shingles, cold sores, and nausea. Live vaccines should be avoided.  This medication has been linked to serious infections; higher rate of mortality; malignancy and lymphoproliferative disorders; major adverse cardiovascular events; thrombosis; gastrointestinal perforations; neutropenia; lymphopenia; anemia; liver enzyme elevations; and lipid elevations. Finasteride Pregnancy And Lactation Text: This medication is absolutely contraindicated during pregnancy. It is unknown if it is excreted in breast milk. High Dose Vitamin A Pregnancy And Lactation Text: High dose vitamin A therapy is contraindicated during pregnancy and breast feeding. Doxycycline Counseling:  Patient counseled regarding possible photosensitivity and increased risk for sunburn.  Patient instructed to avoid sunlight, if possible.  When exposed to sunlight, patients should wear protective clothing, sunglasses, and sunscreen.  The patient was instructed to call the office immediately if the following severe adverse effects occur:  hearing changes, easy bruising/bleeding, severe headache, or vision changes.  The patient verbalized understanding of the proper use and possible adverse effects of doxycycline.  All of the patient's questions and concerns were addressed. Opioid Pregnancy And Lactation Text: These medications can lead to premature delivery and should be avoided during pregnancy. These medications are also present in breast milk in small amounts. Ketoconazole Counseling:   Patient counseled regarding improving absorption with orange juice.  Adverse effects include but are not limited to breast enlargement, headache, diarrhea, nausea, upset stomach, liver function test abnormalities, taste disturbance, and stomach pain.  There is a rare possibility of liver failure that can occur when taking ketoconazole. The patient understands that monitoring of LFTs may be required, especially at baseline. The patient verbalized understanding of the proper use and possible adverse effects of ketoconazole.  All of the patient's questions and concerns were addressed. Olanzapine Pregnancy And Lactation Text: This medication is pregnancy category C.   There are no adequate and well controlled trials with olanzapine in pregnant females.  Olanzapine should be used during pregnancy only if the potential benefit justifies the potential risk to the fetus.   In a study in lactating healthy women, olanzapine was excreted in breast milk.  It is recommended that women taking olanzapine should not breast feed. Metronidazole Counseling:  I discussed with the patient the risks of metronidazole including but not limited to seizures, nausea/vomiting, a metallic taste in the mouth, nausea/vomiting and severe allergy. Azelaic Acid Counseling: Patient counseled that medicine may cause skin irritation and to avoid applying near the eyes.  In the event of skin irritation, the patient was advised to reduce the amount of the drug applied or use it less frequently.   The patient verbalized understanding of the proper use and possible adverse effects of azelaic acid.  All of the patient's questions and concerns were addressed. Colchicine Counseling:  Patient counseled regarding adverse effects including but not limited to stomach upset (nausea, vomiting, stomach pain, or diarrhea).  Patient instructed to limit alcohol consumption while taking this medication.  Colchicine may reduce blood counts especially with prolonged use.  The patient understands that monitoring of kidney function and blood counts may be required, especially at baseline. The patient verbalized understanding of the proper use and possible adverse effects of colchicine.  All of the patient's questions and concerns were addressed. Adbry Counseling: I discussed with the patient the risks of tralokinumab including but not limited to eye infection and irritation, cold sores, injection site reactions, worsening of asthma, allergic reactions and increased risk of parasitic infection.  Live vaccines should be avoided while taking tralokinumab. The patient understands that monitoring is required and they must alert us or the primary physician if symptoms of infection or other concerning signs are noted. Zoryve Counseling:  I discussed with the patient that Zoryve is not for use in the eyes, mouth or vagina. The most commonly reported side effects include diarrhea, headache, insomnia, application site pain, upper respiratory tract infections, and urinary tract infections.  All of the patient's questions and concerns were addressed. Ivermectin Pregnancy And Lactation Text: This medication is Pregnancy Category C and it isn't known if it is safe during pregnancy. It is also excreted in breast milk. Valtrex Counseling: I discussed with the patient the risks of valacyclovir including but not limited to kidney damage, nausea, vomiting and severe allergy.  The patient understands that if the infection seems to be worsening or is not improving, they are to call. Otezla Pregnancy And Lactation Text: This medication is Pregnancy Category C and it isn't known if it is safe during pregnancy. It is unknown if it is excreted in breast milk. Methotrexate Counseling:  Patient counseled regarding adverse effects of methotrexate including but not limited to nausea, vomiting, abnormalities in liver function tests. Patients may develop mouth sores, rash, diarrhea, and abnormalities in blood counts. The patient understands that monitoring is required including LFT's and blood counts.  There is a rare possibility of scarring of the liver and lung problems that can occur when taking methotrexate. Persistent nausea, loss of appetite, pale stools, dark urine, cough, and shortness of breath should be reported immediately. Patient advised to discontinue methotrexate treatment at least three months before attempting to become pregnant.  I discussed the need for folate supplements while taking methotrexate.  These supplements can decrease side effects during methotrexate treatment. The patient verbalized understanding of the proper use and possible adverse effects of methotrexate.  All of the patient's questions and concerns were addressed. Benzoyl Peroxide Pregnancy And Lactation Text: This medication is Pregnancy Category C. It is unknown if benzoyl peroxide is excreted in breast milk. Sotyktu Pregnancy And Lactation Text: There is insufficient data to evaluate whether or not Sotyktu is safe to use during pregnancy.? ?It is not known if Sotyktu passes into breast milk and whether or not it is safe to use when breastfeeding.?? Clofazimine Counseling:  I discussed with the patient the risks of clofazimine including but not limited to skin and eye pigmentation, liver damage, nausea/vomiting, gastrointestinal bleeding and allergy. Quinolones Counseling:  I discussed with the patient the risks of fluoroquinolones including but not limited to GI upset, allergic reaction, drug rash, diarrhea, dizziness, photosensitivity, yeast infections, liver function test abnormalities, tendonitis/tendon rupture. Xolair Counseling:  Patient informed of potential adverse effects including but not limited to fever, muscle aches, rash and allergic reactions.  The patient verbalized understanding of the proper use and possible adverse effects of Xolair.  All of the patient's questions and concerns were addressed. Erythromycin Pregnancy And Lactation Text: This medication is Pregnancy Category B and is considered safe during pregnancy. It is also excreted in breast milk. Itraconazole Counseling:  I discussed with the patient the risks of itraconazole including but not limited to liver damage, nausea/vomiting, neuropathy, and severe allergy.  The patient understands that this medication is best absorbed when taken with acidic beverages such as non-diet cola or ginger ale.  The patient understands that monitoring is required including baseline LFTs and repeat LFTs at intervals.  The patient understands that they are to contact us or the primary physician if concerning signs are noted. Drysol Counseling:  I discussed with the patient the risks of drysol/aluminum chloride including but not limited to skin rash, itching, irritation, burning. Oxybutynin Counseling:  I discussed with the patient the risks of oxybutynin including but not limited to skin rash, drowsiness, dry mouth, difficulty urinating, and blurred vision. Glycopyrrolate Pregnancy And Lactation Text: This medication is Pregnancy Category B and is considered safe during pregnancy. It is unknown if it is excreted breast milk. Qbrexza Counseling:  I discussed with the patient the risks of Qbrexza including but not limited to headache, mydriasis, blurred vision, dry eyes, nasal dryness, dry mouth, dry throat, dry skin, urinary hesitation, and constipation.  Local skin reactions including erythema, burning, stinging, and itching can also occur. Griseofulvin Pregnancy And Lactation Text: This medication is Pregnancy Category X and is known to cause serious birth defects. It is unknown if this medication is excreted in breast milk but breast feeding should be avoided. Sotyktu Counseling:  I discussed the most common side effects of Sotyktu including: common cold, sore throat, sinus infections, cold sores, canker sores, folliculitis, and acne.? I also discussed more serious side effects of Sotyktu including but not limited to: serious allergic reactions; increased risk for infections such as TB; cancers such as lymphomas; rhabdomyolysis and elevated CPK; and elevated triglycerides and liver enzymes.? Cantharidin Counseling:  I discussed with the patient the risks of Cantharidin including but not limited to pain, redness, burning, itching, and blistering. Soolantra Pregnancy And Lactation Text: This medication is Pregnancy Category C. This medication is considered safe during breast feeding. Bimzelx Counseling:  I discussed with the patient the risks of Bimzelx including but not limited to depression, immunosuppression, allergic reactions and infections.  The patient understands that monitoring is required including a PPD at baseline and must alert us or the primary physician if symptoms of infection or other concerning signs are noted. Dutasteride Male Counseling: Dustasteride Counseling:  I discussed with the patient the risks of use of dutasteride including but not limited to decreased libido, decreased ejaculate volume, and gynecomastia. Women who can become pregnant should not handle medication.  All of the patient's questions and concerns were addressed. Nsaids Counseling: NSAID Counseling: I discussed with the patient that NSAIDs should be taken with food. Prolonged use of NSAIDs can result in the development of stomach ulcers.  Patient advised to stop taking NSAIDs if abdominal pain occurs.  The patient verbalized understanding of the proper use and possible adverse effects of NSAIDs.  All of the patient's questions and concerns were addressed. Litfulo Pregnancy And Lactation Text: Based on animal studies, Lifulo may cause embryo-fetal harm when administered to pregnant women.  The medication should not be used in pregnancy.  Breastfeeding is not recommended during treatment. Minocycline Pregnancy And Lactation Text: This medication is Pregnancy Category D and not consider safe during pregnancy. It is also excreted in breast milk. Clindamycin Counseling: I counseled the patient regarding use of clindamycin as an antibiotic for prophylactic and/or therapeutic purposes. Clindamycin is active against numerous classes of bacteria, including skin bacteria. Side effects may include nausea, diarrhea, gastrointestinal upset, rash, hives, yeast infections, and in rare cases, colitis. Wartpeel Counseling:  I discussed with the patient the risks of Wartpeel including but not limited to erythema, scaling, itching, weeping, crusting, and pain. Protopic Counseling: Patient may experience a mild burning sensation during topical application. Protopic is not approved in children less than 2 years of age. There have been case reports of hematologic and skin malignancies in patients using topical calcineurin inhibitors although causality is questionable. Griseofulvin Counseling:  I discussed with the patient the risks of griseofulvin including but not limited to photosensitivity, cytopenia, liver damage, nausea/vomiting and severe allergy.  The patient understands that this medication is best absorbed when taken with a fatty meal (e.g., ice cream or french fries). Rituxan Pregnancy And Lactation Text: This medication is Pregnancy Category C and it isn't know if it is safe during pregnancy. It is unknown if this medication is excreted in breast milk but similar antibodies are known to be excreted. Cimetidine Counseling:  I discussed with the patient the risks of Cimetidine including but not limited to gynecomastia, headache, diarrhea, nausea, drowsiness, arrhythmias, pancreatitis, skin rashes, psychosis, bone marrow suppression and kidney toxicity. Eucrisa Pregnancy And Lactation Text: This medication has not been assigned a Pregnancy Risk Category but animal studies failed to show danger with the topical medication. It is unknown if the medication is excreted in breast milk. Rituxan Counseling:  I discussed with the patient the risks of Rituxan infusions. Side effects can include infusion reactions, severe drug rashes including mucocutaneous reactions, reactivation of latent hepatitis and other infections and rarely progressive multifocal leukoencephalopathy.  All of the patient's questions and concerns were addressed. Cellcept Counseling:  I discussed with the patient the risks of mycophenolate mofetil including but not limited to infection/immunosuppression, GI upset, hypokalemia, hypercholesterolemia, bone marrow suppression, lymphoproliferative disorders, malignancy, GI ulceration/bleed/perforation, colitis, interstitial lung disease, kidney failure, progressive multifocal leukoencephalopathy, and birth defects.  The patient understands that monitoring is required including a baseline creatinine and regular CBC testing. In addition, patient must alert us immediately if symptoms of infection or other concerning signs are noted. Winlevi Pregnancy And Lactation Text: This medication is considered safe during pregnancy and breastfeeding. Infliximab Counseling:  I discussed with the patient the risks of infliximab including but not limited to myelosuppression, immunosuppression, autoimmune hepatitis, demyelinating diseases, lymphoma, and serious infections.  The patient understands that monitoring is required including a PPD at baseline and must alert us or the primary physician if symptoms of infection or other concerning signs are noted. Imiquimod Counseling:  I discussed with the patient the risks of imiquimod including but not limited to erythema, scaling, itching, weeping, crusting, and pain.  Patient understands that the inflammatory response to imiquimod is variable from person to person and was educated regarded proper titration schedule.  If flu-like symptoms develop, patient knows to discontinue the medication and contact us. Bactrim Pregnancy And Lactation Text: This medication is Pregnancy Category D and is known to cause fetal risk.  It is also excreted in breast milk. Bimzelx Pregnancy And Lactation Text: This medication crosses the placenta and the safety is uncertain during pregnancy. It is unknown if this medication is present in breast milk. Humira Counseling:  I discussed with the patient the risks of adalimumab including but not limited to myelosuppression, immunosuppression, autoimmune hepatitis, demyelinating diseases, lymphoma, and serious infections.  The patient understands that monitoring is required including a PPD at baseline and must alert us or the primary physician if symptoms of infection or other concerning signs are noted. Erythromycin Counseling:  I discussed with the patient the risks of erythromycin including but not limited to GI upset, allergic reaction, drug rash, diarrhea, increase in liver enzymes, and yeast infections. Azathioprine Counseling:  I discussed with the patient the risks of azathioprine including but not limited to myelosuppression, immunosuppression, hepatotoxicity, lymphoma, and infections.  The patient understands that monitoring is required including baseline LFTs, Creatinine, possible TPMP genotyping and weekly CBCs for the first month and then every 2 weeks thereafter.  The patient verbalized understanding of the proper use and possible adverse effects of azathioprine.  All of the patient's questions and concerns were addressed. Aklief counseling:  Patient advised to apply a pea-sized amount only at bedtime and wait 30 minutes after washing their face before applying.  If too drying, patient may add a non-comedogenic moisturizer.  The most commonly reported side effects including irritation, redness, scaling, dryness, stinging, burning, itching, and increased risk of sunburn.  The patient verbalized understanding of the proper use and possible adverse effects of retinoids.  All of the patient's questions and concerns were addressed. Niacinamide Counseling: I recommended taking niacin or niacinamide, also know as vitamin B3, twice daily. Recent evidence suggests that taking vitamin B3 (500 mg twice daily) can reduce the risk of actinic keratoses and non-melanoma skin cancers. Side effects of vitamin B3 include flushing and headache. Cephalexin Pregnancy And Lactation Text: This medication is Pregnancy Category B and considered safe during pregnancy.  It is also excreted in breast milk but can be used safely for shorter doses. Doxepin Counseling:  Patient advised that the medication is sedating and not to drive a car after taking this medication. Patient informed of potential adverse effects including but not limited to dry mouth, urinary retention, and blurry vision.  The patient verbalized understanding of the proper use and possible adverse effects of doxepin.  All of the patient's questions and concerns were addressed. Rifampin Counseling: I discussed with the patient the risks of rifampin including but not limited to liver damage, kidney damage, red-orange body fluids, nausea/vomiting and severe allergy. Libtayo Counseling- I discussed with the patient the risks of Libtayo including but not limited to nausea, vomiting, diarrhea, and bone or muscle pain.  The patient verbalized understanding of the proper use and possible adverse effects of Libtayo.  All of the patient's questions and concerns were addressed. Arava Counseling:  Patient counseled regarding adverse effects of Arava including but not limited to nausea, vomiting, abnormalities in liver function tests. Patients may develop mouth sores, rash, diarrhea, and abnormalities in blood counts. The patient understands that monitoring is required including LFTs and blood counts.  There is a rare possibility of scarring of the liver and lung problems that can occur when taking methotrexate. Persistent nausea, loss of appetite, pale stools, dark urine, cough, and shortness of breath should be reported immediately. Patient advised to discontinue Arava treatment and consult with a physician prior to attempting conception. The patient will have to undergo a treatment to eliminate Arava from the body prior to conception. 5-Fu Counseling: 5-Fluorouracil Counseling:  I discussed with the patient the risks of 5-fluorouracil including but not limited to erythema, scaling, itching, weeping, crusting, and pain. Cibinqo Pregnancy And Lactation Text: It is unknown if this medication will adversely affect pregnancy or breast feeding.  You should not take this medication if you are currently pregnant or planning a pregnancy or while breastfeeding. Hydroxychloroquine Counseling:  I discussed with the patient that a baseline ophthalmologic exam is needed at the start of therapy and every year thereafter while on therapy. A CBC may also be warranted for monitoring.  The side effects of this medication were discussed with the patient, including but not limited to agranulocytosis, aplastic anemia, seizures, rashes, retinopathy, and liver toxicity. Patient instructed to call the office should any adverse effect occur.  The patient verbalized understanding of the proper use and possible adverse effects of Plaquenil.  All the patient's questions and concerns were addressed. Rifampin Pregnancy And Lactation Text: This medication is Pregnancy Category C and it isn't know if it is safe during pregnancy. It is also excreted in breast milk and should not be used if you are breast feeding. Olanzapine Counseling- I discussed with the patient the common side effects of olanzapine including but are not limited to: lack of energy, dry mouth, increased appetite, sleepiness, tremor, constipation, dizziness, changes in behavior, or restlessness.  Explained that teenagers are more likely to experience headaches, abdominal pain, pain in the arms or legs, tiredness, and sleepiness.  Serious side effects include but are not limited: increased risk of death in elderly patients who are confused, have memory loss, or dementia-related psychosis; hyperglycemia; increased cholesterol and triglycerides; and weight gain. Glycopyrrolate Counseling:  I discussed with the patient the risks of glycopyrrolate including but not limited to skin rash, drowsiness, dry mouth, difficulty urinating, and blurred vision. Hydroxyzine Pregnancy And Lactation Text: This medication is not safe during pregnancy and should not be taken. It is also excreted in breast milk and breast feeding isn't recommended. Acitretin Counseling:  I discussed with the patient the risks of acitretin including but not limited to hair loss, dry lips/skin/eyes, liver damage, hyperlipidemia, depression/suicidal ideation, photosensitivity.  Serious rare side effects can include but are not limited to pancreatitis, pseudotumor cerebri, bony changes, clot formation/stroke/heart attack.  Patient understands that alcohol is contraindicated since it can result in liver toxicity and significantly prolong the elimination of the drug by many years. Opzelura Counseling:  I discussed with the patient the risks of Opzelura including but not limited to nasopharngitis, bronchitis, ear infection, eosinophila, hives, diarrhea, folliculitis, tonsillitis, and rhinorrhea.  Taken orally, this medication has been linked to serious infections; higher rate of mortality; malignancy and lymphoproliferative disorders; major adverse cardiovascular events; thrombosis; thrombocytopenia, anemia, and neutropenia; and lipid elevations. Dupixent Counseling: I discussed with the patient the risks of dupilumab including but not limited to eye infection and irritation, cold sores, injection site reactions, worsening of asthma, allergic reactions and increased risk of parasitic infection.  Live vaccines should be avoided while taking dupilumab. Dupilumab will also interact with certain medications such as warfarin and cyclosporine. The patient understands that monitoring is required and they must alert us or the primary physician if symptoms of infection or other concerning signs are noted. Olumiant Pregnancy And Lactation Text: Based on animal studies, Olumiant may cause embryo-fetal harm when administered to pregnant women.  The medication should not be used in pregnancy.  Breastfeeding is not recommended during treatment. Valtrex Pregnancy And Lactation Text: this medication is Pregnancy Category B and is considered safe during pregnancy. This medication is not directly found in breast milk but it's metabolite acyclovir is present. Ilumya Counseling: I discussed with the patient the risks of tildrakizumab including but not limited to immunosuppression, malignancy, posterior leukoencephalopathy syndrome, and serious infections.  The patient understands that monitoring is required including a PPD at baseline and must alert us or the primary physician if symptoms of infection or other concerning signs are noted. Hyrimoz Counseling:  I discussed with the patient the risks of adalimumab including but not limited to myelosuppression, immunosuppression, autoimmune hepatitis, demyelinating diseases, lymphoma, and serious infections.  The patient understands that monitoring is required including a PPD at baseline and must alert us or the primary physician if symptoms of infection or other concerning signs are noted. Birth Control Pills Pregnancy And Lactation Text: This medication should be avoided if pregnant and for the first 30 days post-partum. Azithromycin Counseling:  I discussed with the patient the risks of azithromycin including but not limited to GI upset, allergic reaction, drug rash, diarrhea, and yeast infections. Terbinafine Counseling: Patient counseling regarding adverse effects of terbinafine including but not limited to headache, diarrhea, rash, upset stomach, liver function test abnormalities, itching, taste/smell disturbance, nausea, abdominal pain, and flatulence.  There is a rare possibility of liver failure that can occur when taking terbinafine.  The patient understands that a baseline LFT and kidney function test may be required. The patient verbalized understanding of the proper use and possible adverse effects of terbinafine.  All of the patient's questions and concerns were addressed. Cyclophosphamide Counseling:  I discussed with the patient the risks of cyclophosphamide including but not limited to hair loss, hormonal abnormalities, decreased fertility, abdominal pain, diarrhea, nausea and vomiting, bone marrow suppression and infection. The patient understands that monitoring is required while taking this medication. Skyrizi Counseling: I discussed with the patient the risks of risankizumab-rzaa including but not limited to immunosuppression, and serious infections.  The patient understands that monitoring is required including a PPD at baseline and must alert us or the primary physician if symptoms of infection or other concerning signs are noted. Qbrexza Pregnancy And Lactation Text: There is no available data on Qbrexza use in pregnant women.  There is no available data on Qbrexza use in lactation. Protopic Pregnancy And Lactation Text: This medication is Pregnancy Category C. It is unknown if this medication is excreted in breast milk when applied topically. Hydroxychloroquine Pregnancy And Lactation Text: This medication has been shown to cause fetal harm but it isn't assigned a Pregnancy Risk Category. There are small amounts excreted in breast milk. Mirvaso Counseling: Mirvaso is a topical medication which can decrease superficial blood flow where applied. Side effects are uncommon and include stinging, redness and allergic reactions. Solaraze Pregnancy And Lactation Text: This medication is Pregnancy Category B and is considered safe. There is some data to suggest avoiding during the third trimester. It is unknown if this medication is excreted in breast milk. Prednisone Counseling:  I discussed with the patient the risks of prolonged use of prednisone including but not limited to weight gain, insomnia, osteoporosis, mood changes, diabetes, susceptibility to infection, glaucoma and high blood pressure.  In cases where prednisone use is prolonged, patients should be monitored with blood pressure checks, serum glucose levels and an eye exam.  Additionally, the patient may need to be placed on GI prophylaxis, PCP prophylaxis, and calcium and vitamin D supplementation and/or a bisphosphonate.  The patient verbalized understanding of the proper use and the possible adverse effects of prednisone.  All of the patient's questions and concerns were addressed. Azithromycin Pregnancy And Lactation Text: This medication is considered safe during pregnancy and is also secreted in breast milk. Topical Retinoid counseling:  Patient advised to apply a pea-sized amount only at bedtime and wait 30 minutes after washing their face before applying.  If too drying, patient may add a non-comedogenic moisturizer. The patient verbalized understanding of the proper use and possible adverse effects of retinoids.  All of the patient's questions and concerns were addressed. Minocycline Counseling: Patient advised regarding possible photosensitivity and discoloration of the teeth, skin, lips, tongue and gums.  Patient instructed to avoid sunlight, if possible.  When exposed to sunlight, patients should wear protective clothing, sunglasses, and sunscreen.  The patient was instructed to call the office immediately if the following severe adverse effects occur:  hearing changes, easy bruising/bleeding, severe headache, or vision changes.  The patient verbalized understanding of the proper use and possible adverse effects of minocycline.  All of the patient's questions and concerns were addressed. Hydroxyzine Counseling: Patient advised that the medication is sedating and not to drive a car after taking this medication.  Patient informed of potential adverse effects including but not limited to dry mouth, urinary retention, and blurry vision.  The patient verbalized understanding of the proper use and possible adverse effects of hydroxyzine.  All of the patient's questions and concerns were addressed.

## 2024-11-05 NOTE — ED PROVIDER NOTE - OBJECTIVE STATEMENT
63yo homeless male presents for constipation. Last BM today, hard pieces. Hx of chronic constipation. Pt admitted to other hospitals this week but cannot say why. Pt poor historian. MEDICINE

## 2025-02-19 NOTE — ED ADULT NURSE REASSESSMENT NOTE - NS ED NURSE REASSESS COMMENT FT1
Pt received from previous shift RN. Voiced no complaints at this time.  Safety precautions maintained. [No Lymphadenopathy] : no lymphadenopathy [Supple] : supple [Thyroid Normal, No Nodules] : the thyroid was normal and there were no nodules present [Normal] : normal rate, regular rhythm, normal S1 and S2 and no murmur heard [Pedal Pulses Present] : the pedal pulses are present [No Edema] : there was no peripheral edema [Soft] : abdomen soft [Non Tender] : non-tender [Non-distended] : non-distended [Normal Bowel Sounds] : normal bowel sounds [No CVA Tenderness] : no CVA  tenderness [No Spinal Tenderness] : no spinal tenderness [No Joint Swelling] : no joint swelling [No Rash] : no rash [Coordination Grossly Intact] : coordination grossly intact [No Focal Deficits] : no focal deficits [Normal Gait] : normal gait [Normal Affect] : the affect was normal [Normal Mood] : the mood was normal [de-identified] : friendly healthy male

## 2025-02-26 NOTE — ED ADULT NURSE NOTE - CAS DISCH CONDITION
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 He is now status post for the creation of a bypass from the left femoral artery to the distal tibial artery using a reversed saphenous vein graft, with a completion angiogram completed on February 7, 2025. The Endocrine team has been consulted due to his uncontrolled diabetes. Endocrine re-consulted for hyperglycemia   1. Type 2 DM  Test BG AC/HS  Lantus to 14 unit at bedtime  Admelog 10 units AC meals for now, if trending down will adjust further  Continue moderate correction scale Lispro at meal times and a separate moderate correction scale Lispro at bedtime until infection improves then change to low does sliding scale.     Discharge Planning:   Likely resume oral medications, specifically Jardiance and Pioglitazone. It is also worth considering switching from Jentadueto to Metformin with a GLP-1 receptor agonist. Trulicity 0.75 mg weekly should be sent to the pharmacy to verify insurance coverage. The patient confirmed no personal or family history of pancreatitis or medullary thyroid cancer. pt is willing to do weekly injection   Will consider GLP1 only if patient tolerating POs well  Pt should monitor BGs at least BID while on oral medications. Contact PCP or endocrinologist if BG < 70 X1, > 400 X1 or consistently > 200  Can follow up with [facility] - [address] [place] [address], [address]. [place] [address]  Tel) [phone]   Outpatient routine ophthalmology and podiatry evaluation.    2. Benign essential HTN.   Blood Pressure Goal: 130/80  To be managed by the primary care team.    3. HLD (hyperlipidemia).   Continue Atorvastatin 40 mg at bedtime  Goal LDL: <70 mg/dL  Management to continue as an outpatient   Fair

## 2025-05-07 NOTE — ED ADULT NURSE NOTE - NS ED NURSE REPORT GIVEN DT
Attempted to reach pt. Detailed VMMLOM advising pt of same and to avoid NSAIDS while on the dose pack. Examples provided. CB# provided if there are questions.     03-Jul-2023 00:11

## 2025-06-09 NOTE — ED ADULT NURSE NOTE - HOW PATIENT ADDRESSED, PROFILE
[FreeTextEntry1] : 1. h/o colon polyp tubular adenoma due for surveillance colonoscopy at this time  Discussed risks including but not limited to bleeding,infection,drug reaction, perforation,missed lesion,benefits and alternatives of colonoscopy/egd with patient  including no treatment and patient consents to procedure.   plan colonoscopy to be scheduled ater 2 day prep  2 chronic constipation  plan miralax daily  
Rodrigo
